# Patient Record
Sex: FEMALE | Race: WHITE | NOT HISPANIC OR LATINO | Employment: OTHER | URBAN - METROPOLITAN AREA
[De-identification: names, ages, dates, MRNs, and addresses within clinical notes are randomized per-mention and may not be internally consistent; named-entity substitution may affect disease eponyms.]

---

## 2017-02-23 ENCOUNTER — HISTORICAL (OUTPATIENT)
Dept: ADMINISTRATIVE | Facility: HOSPITAL | Age: 67
End: 2017-02-23

## 2017-02-23 LAB
ALBUMIN SERPL-MCNC: 4.2 G/DL (ref 3.1–4.7)
ALP SERPL-CCNC: 82 IU/L (ref 40–104)
ALT (SGPT): 15 IU/L (ref 3–33)
AST SERPL-CCNC: 21 IU/L (ref 10–40)
BILIRUB SERPL-MCNC: 0.5 MG/DL (ref 0.3–1)
BUN SERPL-MCNC: 12 MG/DL (ref 8–20)
CALCIUM SERPL-MCNC: 9.1 MG/DL (ref 7.7–10.4)
CHLORIDE: 99 MMOL/L (ref 98–110)
CO2 SERPL-SCNC: 26 MMOL/L (ref 22.8–31.6)
CREATININE RANDOM URINE: 56 MG/DL
CREATININE: 0.79 MG/DL (ref 0.6–1.4)
GLUCOSE: 100 MG/DL (ref 70–99)
MICROALBUM.,U,RANDOM: 3.8 MCG/ML (ref 0–19.9)
MICROALBUMIN/CREATININE RATIO: 7 (ref 0–30)
POTASSIUM SERPL-SCNC: 3.9 MMOL/L (ref 3.5–5)
PROT SERPL-MCNC: 7.8 G/DL (ref 6–8.2)
SODIUM: 134 MMOL/L (ref 134–144)

## 2017-05-23 RX ORDER — ALBUTEROL SULFATE 90 UG/1
1 AEROSOL, METERED RESPIRATORY (INHALATION) DAILY PRN
COMMUNITY
Start: 2017-02-24 | End: 2018-07-09 | Stop reason: SDUPTHER

## 2017-05-23 RX ORDER — SERTRALINE HYDROCHLORIDE 50 MG/1
1 TABLET, FILM COATED ORAL DAILY
COMMUNITY
Start: 2017-03-25 | End: 2018-04-04 | Stop reason: SDUPTHER

## 2017-05-23 RX ORDER — AMLODIPINE AND OLMESARTAN MEDOXOMIL 5; 20 MG/1; MG/1
1 TABLET ORAL DAILY
COMMUNITY
Start: 2017-02-24 | End: 2018-01-08 | Stop reason: SDUPTHER

## 2017-05-23 RX ORDER — ASPIRIN 81 MG/1
1 TABLET ORAL DAILY
COMMUNITY
End: 2019-10-01 | Stop reason: SDUPTHER

## 2017-05-23 RX ORDER — OXYBUTYNIN CHLORIDE 5 MG/1
1 TABLET ORAL DAILY
COMMUNITY
Start: 2017-02-24 | End: 2017-08-18 | Stop reason: SDUPTHER

## 2017-05-23 RX ORDER — ESTRADIOL 0.05 MG/D
1 PATCH TRANSDERMAL WEEKLY
COMMUNITY
Start: 2016-12-07 | End: 2018-01-08 | Stop reason: SDUPTHER

## 2017-07-28 ENCOUNTER — TELEPHONE (OUTPATIENT)
Dept: FAMILY MEDICINE | Facility: CLINIC | Age: 67
End: 2017-07-28

## 2017-07-28 NOTE — TELEPHONE ENCOUNTER
Patient is wanting to know if she could take Green tea fat burner along with her current medications?

## 2017-08-11 ENCOUNTER — OFFICE VISIT (OUTPATIENT)
Dept: FAMILY MEDICINE | Facility: CLINIC | Age: 67
End: 2017-08-11
Payer: MEDICARE

## 2017-08-11 VITALS
HEIGHT: 67 IN | BODY MASS INDEX: 26.06 KG/M2 | SYSTOLIC BLOOD PRESSURE: 122 MMHG | HEART RATE: 79 BPM | WEIGHT: 166 LBS | DIASTOLIC BLOOD PRESSURE: 62 MMHG

## 2017-08-11 DIAGNOSIS — Z71.85 IMMUNIZATION COUNSELING: ICD-10-CM

## 2017-08-11 DIAGNOSIS — B96.89 LOCALIZED BACTERIAL SKIN INFECTION: ICD-10-CM

## 2017-08-11 DIAGNOSIS — E78.00 HYPERCHOLESTEROLEMIA: ICD-10-CM

## 2017-08-11 DIAGNOSIS — I10 BENIGN ESSENTIAL HYPERTENSION: Primary | ICD-10-CM

## 2017-08-11 DIAGNOSIS — L08.9 LOCALIZED BACTERIAL SKIN INFECTION: ICD-10-CM

## 2017-08-11 PROBLEM — M85.80 OSTEOPENIA: Status: ACTIVE | Noted: 2017-08-11

## 2017-08-11 PROBLEM — Z78.0 MENOPAUSE PRESENT: Status: ACTIVE | Noted: 2017-08-11

## 2017-08-11 PROBLEM — J44.9 CHRONIC OBSTRUCTIVE PULMONARY DISEASE: Status: ACTIVE | Noted: 2017-08-11

## 2017-08-11 PROBLEM — F41.9 ANXIETY: Status: ACTIVE | Noted: 2017-08-11

## 2017-08-11 PROBLEM — F17.200 CURRENT SMOKER: Status: ACTIVE | Noted: 2017-08-11

## 2017-08-11 PROBLEM — J30.1 HAY FEVER: Status: ACTIVE | Noted: 2017-08-11

## 2017-08-11 PROBLEM — N39.44 NOCTURNAL ENURESIS: Status: ACTIVE | Noted: 2017-02-24

## 2017-08-11 PROCEDURE — G0009 ADMIN PNEUMOCOCCAL VACCINE: HCPCS | Mod: ,,, | Performed by: INTERNAL MEDICINE

## 2017-08-11 PROCEDURE — 3078F DIAST BP <80 MM HG: CPT | Mod: ,,, | Performed by: INTERNAL MEDICINE

## 2017-08-11 PROCEDURE — 99213 OFFICE O/P EST LOW 20 MIN: CPT | Mod: 25,,, | Performed by: INTERNAL MEDICINE

## 2017-08-11 PROCEDURE — 90732 PPSV23 VACC 2 YRS+ SUBQ/IM: CPT | Mod: ,,, | Performed by: INTERNAL MEDICINE

## 2017-08-11 PROCEDURE — 3074F SYST BP LT 130 MM HG: CPT | Mod: ,,, | Performed by: INTERNAL MEDICINE

## 2017-08-11 PROCEDURE — 1159F MED LIST DOCD IN RCRD: CPT | Mod: ,,, | Performed by: INTERNAL MEDICINE

## 2017-08-11 PROCEDURE — 1125F AMNT PAIN NOTED PAIN PRSNT: CPT | Mod: ,,, | Performed by: INTERNAL MEDICINE

## 2017-08-11 RX ORDER — CELECOXIB 200 MG/1
1 CAPSULE ORAL DAILY
Refills: 0 | COMMUNITY
Start: 2017-07-10 | End: 2021-05-07

## 2017-08-11 RX ORDER — ASCORBIC ACID 500 MG
TABLET ORAL
COMMUNITY
End: 2018-04-18

## 2017-08-11 RX ORDER — MUPIROCIN 20 MG/G
OINTMENT TOPICAL 2 TIMES DAILY
Qty: 22 G | Refills: 1 | Status: SHIPPED | OUTPATIENT
Start: 2017-08-11 | End: 2018-04-18

## 2017-08-11 NOTE — PATIENT INSTRUCTIONS
Taking Your Blood Pressure  Blood pressure is the force of blood against the artery wall as it moves from the heart through the blood vessels. You can take your own blood pressure reading using a digital monitor. Take readings as often as your healthcare provider instructs. Take each reading at the same time of day.  Step 1. Relax    · Take your blood pressure at the same time every day, such as in the morning or evening, or at the time your healthcare provider recommends.  · Wait at least a half-hour after smoking, eating, drinking caffeinated beverages, or exercising.  · Sit comfortably at a table with both feet on the floor. Do not cross your legs or feet. Place the monitor near you.  · Rest for a few minutes before you begin.  Step 2. Wrap the cuff    · Place your arm on the table, palm up. Your arm should be at the level of your heart. Wrap the cuff around your upper arm, just above your elbow. Its best done on bare skin, not over clothing. Most cuffs will indicate where the brachial artery (the blood vessel in the middle of the arm at the inner side of the elbow) should line up with the cuff. Look in your monitor's instruction booklet for an illustration. You can also bring your cuff to your healthcare provider and have them show you how to correctly place the cuff.  · Make sure your cuff fits. If it doesnt wrap around your upper arm, order a larger cuff.  Step 3. Inflate the cuff    · Pump the cuff until the scale reads 160. If you have a self-inflating cuff, push the button that starts the pump.  · The cuff will tighten, then loosen.  · The numbers will change. When they stop changing, your blood pressure reading will appear.  · Take 2 or 3 readings one minute apart.  Step 4. Write down the results of each reading    · Write down your blood pressure numbers for each reading. Note the date and time. Keep your results in one place, such as a notebook. Even if your monitor has a built-in memory, keep a hard  copy of the readings.  · Remove the cuff from your arm. Turn off the machine.  · Share your blood pressure records with your healthcare providers at each visit.  Date Last Reviewed: 4/27/2016  © 8899-9039 [employer]. [address] [place] [address], [place] [address]. All rights reserved. This information is not intended as a substitute for professional medical care. Always follow your healthcare professional's instructions.

## 2017-08-11 NOTE — PROGRESS NOTES
Subjective:       Patient ID: Jia Vega is a 67 y.o. female.    Chief Complaint: Hypertension and Hyperlipidemia    Ms. Jia Vega is a 67-year-old  female who comes for follow-up on hypertension and dyslipidemia. She also gets intermittent skin infections in her groin and she requests refill on Mupirocin.    She is also due for pneumococcal 23 vaccine.      Hypertension   This is a chronic problem. The current episode started more than 1 year ago. The problem is controlled. Pertinent negatives include no chest pain, headaches, neck pain, palpitations, peripheral edema or shortness of breath. There are no associated agents to hypertension. Past treatments include calcium channel blockers and angiotensin blockers (Amlodipine/olmesartan). The current treatment provides moderate improvement. Compliance problems include psychosocial issues.  There is no history of renovascular disease. There is no history of a hypertension causing med or pheochromocytoma.   Hyperlipidemia   This is a chronic problem. The current episode started more than 1 year ago. The problem is controlled. She has no history of diabetes, obesity or nephrotic syndrome. Pertinent negatives include no chest pain or shortness of breath. Current antihyperlipidemic treatment includes statins. Risk factors for coronary artery disease include hypertension.       Past Medical History:   Diagnosis Date    Arthritis     Asthma     Full dentures     GERD (gastroesophageal reflux disease)     Hypertension     Seasonal allergies     Wears glasses      Social History     Social History    Marital status:      Spouse name: N/A    Number of children: N/A    Years of education: N/A     Occupational History    Not on file.     Social History Main Topics    Smoking status: Current Every Day Smoker     Packs/day: 0.25     Years: 40.00     Types: Cigarettes    Smokeless tobacco: Never Used      Comment: patient states 3 cigs  "per day    Alcohol use No    Drug use: No    Sexual activity: No     Other Topics Concern    Not on file     Social History Narrative    No narrative on file     Past Surgical History:   Procedure Laterality Date    BACK SURGERY      cubital tunnel release  left    HAND SURGERY      right and left: ganglion cyst    HYSTERECTOMY  BSO    NECK SURGERY       Family History   Problem Relation Age of Onset    Family history unknown: Yes       Review of Systems   Constitutional: Negative for activity change, chills, fatigue, fever and unexpected weight change.   HENT: Negative for congestion, postnasal drip and sinus pressure.    Eyes: Negative for pain, discharge and visual disturbance.   Respiratory: Negative for cough, chest tightness and shortness of breath.    Cardiovascular: Negative for chest pain, palpitations and leg swelling.        Patient has hypertension   Gastrointestinal: Negative for abdominal distention, anal bleeding, constipation and diarrhea.   Endocrine:        Patient has dyslipidemia.   Genitourinary: Negative for difficulty urinating, dysuria, flank pain, frequency, menstrual problem, pelvic pain, vaginal bleeding and vaginal pain.   Musculoskeletal: Negative for arthralgias, joint swelling and neck pain.   Skin: Negative for color change, pallor and rash.        Patient gets intermittent skin infections and groin.   Allergic/Immunologic: Negative for environmental allergies, food allergies and immunocompromised state.   Neurological: Negative for dizziness, tremors, seizures, syncope, light-headedness and headaches.   Hematological: Negative for adenopathy. Does not bruise/bleed easily.   Psychiatric/Behavioral: Negative for agitation, confusion and dysphoric mood. The patient is not nervous/anxious.        Objective:       Vitals:    08/11/17 0804   BP: 122/62   Pulse: 79   Weight: 75.3 kg (166 lb)   Height: 5' 7" (1.702 m)     Physical Exam   Constitutional: She is oriented to person, " place, and time. She appears well-developed and well-nourished. She is cooperative. No distress.   HENT:   Head: Normocephalic and atraumatic.   Eyes: Conjunctivae, EOM and lids are normal. Pupils are equal, round, and reactive to light. Lids are everted and swept, no foreign bodies found. Right pupil is round and reactive. Left pupil is round and reactive.   Neck: Trachea normal and normal range of motion. Neck supple.   Cardiovascular: Normal rate, regular rhythm, S1 normal, S2 normal, normal heart sounds and intact distal pulses.    Pulmonary/Chest: Breath sounds normal.   Abdominal: Soft. Bowel sounds are normal. There is no rigidity and no guarding.   Musculoskeletal: Normal range of motion.   Lymphadenopathy:     She has no cervical adenopathy.   Neurological: She is alert and oriented to person, place, and time.   Skin: Skin is warm and dry. Capillary refill takes less than 2 seconds.   Psychiatric: Her behavior is normal. Judgment and thought content normal.   Nursing note and vitals reviewed.      Assessment:       1. Benign essential hypertension    2. Hypercholesterolemia    3. Localized bacterial skin infection    4. Immunization counseling         Plan:           Benign essential hypertension    Hypercholesterolemia    Localized bacterial skin infection  -     mupirocin (BACTROBAN) 2 % ointment; Apply topically 2 (two) times daily.  Dispense: 22 g; Refill: 1    Immunization counseling  -     (In Office Administered) Pneumococcal Polysaccharide Vaccine (23 Valent) (SQ/IM)    Patient has been advised to quit smoking. She'll be updated on pneumonia 23 vaccine. Subsequently will give order for shingles vaccine if she has not already had one. In another month the influenza season starts and she is welcome to get that vaccine from the office.    Current Outpatient Prescriptions on File Prior to Visit   Medication Sig Dispense Refill    albuterol (PROAIR HFA) 90 mcg/actuation inhaler Inhale 1 puff into the  lungs daily as needed.      amlodipine-olmesartan (ALICE) 5-20 mg per tablet Take 1 tablet by mouth Daily.      aspirin (ECOTRIN) 81 MG EC tablet Take 1 tablet by mouth Daily.      esomeprazole (NEXIUM) 40 MG capsule Take 40 mg by mouth before breakfast.       estradiol 0.05 mg/24 hr td ptwk (CLIMARA) 0.05 mg/24 hr PTWK Place 1 patch onto the skin once a week.      multivitamin capsule Take 1 capsule by mouth once daily.        pravastatin (PRAVACHOL) 80 MG tablet Take 80 mg by mouth once daily.       VITAMIN B COMPLEX (B COMPLEX 1 ORAL) Take 1 tablet by mouth Daily.      oxybutynin (DITROPAN) 5 MG Tab Take 1 tablet by mouth Daily.      sertraline (ZOLOFT) 50 MG tablet Take 1 tablet by mouth Daily.       No current facility-administered medications on file prior to visit.

## 2017-08-18 RX ORDER — OXYBUTYNIN CHLORIDE 5 MG/1
TABLET ORAL
Qty: 90 TABLET | Refills: 3 | Status: SHIPPED | OUTPATIENT
Start: 2017-08-18 | End: 2017-11-08 | Stop reason: SDUPTHER

## 2017-11-08 ENCOUNTER — OFFICE VISIT (OUTPATIENT)
Dept: FAMILY MEDICINE | Facility: CLINIC | Age: 67
End: 2017-11-08
Payer: MEDICARE

## 2017-11-08 VITALS
HEART RATE: 79 BPM | RESPIRATION RATE: 14 BRPM | BODY MASS INDEX: 26.68 KG/M2 | DIASTOLIC BLOOD PRESSURE: 62 MMHG | WEIGHT: 170 LBS | SYSTOLIC BLOOD PRESSURE: 113 MMHG | HEIGHT: 67 IN

## 2017-11-08 DIAGNOSIS — E78.00 HYPERCHOLESTEROLEMIA: ICD-10-CM

## 2017-11-08 DIAGNOSIS — N39.44 NOCTURNAL ENURESIS: ICD-10-CM

## 2017-11-08 DIAGNOSIS — Z01.818 PREOP EXAM FOR INTERNAL MEDICINE: Primary | ICD-10-CM

## 2017-11-08 DIAGNOSIS — I10 BENIGN ESSENTIAL HYPERTENSION: ICD-10-CM

## 2017-11-08 DIAGNOSIS — J43.1 PANLOBULAR EMPHYSEMA: ICD-10-CM

## 2017-11-08 PROCEDURE — 99213 OFFICE O/P EST LOW 20 MIN: CPT | Mod: ,,, | Performed by: INTERNAL MEDICINE

## 2017-11-08 RX ORDER — OXYBUTYNIN CHLORIDE 5 MG/1
5 TABLET ORAL NIGHTLY
Qty: 90 TABLET | Refills: 3 | Status: SHIPPED | OUTPATIENT
Start: 2017-11-08 | End: 2018-08-23

## 2017-11-08 NOTE — PROGRESS NOTES
Subjective:       Patient ID: Jia Vega is a 67 y.o. female.    Chief Complaint: Pre-op Exam (eye surg )    The procedure scheduled is a cataract surgery on November/December 2017. The surgeon for the procedure will be Dr. bree M.D. ophthalmology. The chief complaint is cataractsContinue to watch diet, exercise and weight management. Cut down on fats increase. More greens and vegetables.. Recent symptoms include cough and urinary frequency, while recent symptoms do not include fever, chills, fatigue, chest pain, dyspnea, dysuria, nausea, vomiting, diarrhea, abdominal pain, easy bruising or lower extremity swelling. The patient's last health maintenance visit was month(s) ago. Pertinent family history does not include anesthesia reaction, myocardial infarction, stroke, aneurysm, sudden death, clotting disorder or bleeding disorder. Pertinent social history includes tobacco use and wearing dentures or partial plates, while pertinent social history does not include illicit drug use, transfusion refusal or concerns regarding care after surgery. After surgery the patient plans to recover at home with family.    Patient has underlying hypertension, dyslipidemia, GERD, arthritis and mild intermittent exacerbation of COPD and bronchitis. She has chronic insomnia also.  Recently she has been complaining of bladder incontinence symptoms and nocturia. I've given her a prescription of Ditropan last time which apparently either did not reach the pharmacy or she lost the prescription.    She uses also estrogen patch for menopausal symptoms. She has been advised that tobacco use and estrogen patch do not go well together and may increase the risk of blood clots.      Hypertension   This is a chronic problem. The current episode started more than 1 year ago. The problem has been waxing and waning since onset. The problem is controlled. Associated symptoms include anxiety, malaise/fatigue and shortness of breath.  Pertinent negatives include no chest pain, headaches, neck pain, palpitations or peripheral edema. Risk factors for coronary artery disease include smoking/tobacco exposure and sedentary lifestyle. Past treatments include calcium channel blockers and angiotensin blockers. Compliance problems include psychosocial issues.  There is no history of a hypertension causing med.   Hyperlipidemia   This is a chronic problem. The current episode started more than 1 year ago. The problem is controlled. Associated symptoms include shortness of breath. Pertinent negatives include no chest pain. Current antihyperlipidemic treatment includes statins.   Shortness of Breath   This is a chronic problem. The current episode started more than 1 year ago. The problem occurs intermittently. Pertinent negatives include no chest pain, fever, headaches, leg swelling, neck pain or rash. She has tried beta agonist inhalers for the symptoms. The treatment provided moderate relief. Her past medical history is significant for COPD (smoker).       Past Medical History:   Diagnosis Date    Arthritis     Asthma     Full dentures     GERD (gastroesophageal reflux disease)     Hypertension     Seasonal allergies     Wears glasses      Social History     Social History    Marital status:      Spouse name: N/A    Number of children: N/A    Years of education: N/A     Occupational History    Not on file.     Social History Main Topics    Smoking status: Current Every Day Smoker     Packs/day: 0.25     Years: 40.00     Types: Cigarettes    Smokeless tobacco: Never Used      Comment: patient states 3 cigs per day    Alcohol use No    Drug use: No    Sexual activity: No     Other Topics Concern    Not on file     Social History Narrative    No narrative on file     Past Surgical History:   Procedure Laterality Date    BACK SURGERY      cubital tunnel release  left    HAND SURGERY      right and left: ganglion cyst     "HYSTERECTOMY  BSO    NECK SURGERY       Family History   Problem Relation Age of Onset    Family history unknown: Yes       Review of Systems   Constitutional: Positive for malaise/fatigue. Negative for activity change, chills, fatigue, fever and unexpected weight change.   HENT: Negative for congestion, postnasal drip and sinus pressure.    Eyes: Positive for visual disturbance. Negative for pain and discharge.        Patient has bilateral cataracts.   Respiratory: Positive for shortness of breath. Negative for cough and chest tightness.         Long-standing history of smoking.   Cardiovascular: Negative for chest pain, palpitations and leg swelling.        Patient has hypertension   Gastrointestinal: Negative for abdominal distention, anal bleeding, constipation and diarrhea.   Endocrine: Negative for heat intolerance and polydipsia.        Patient has dyslipidemia.   Genitourinary: Positive for enuresis and frequency. Negative for difficulty urinating, dysuria and flank pain.        Nocturia and enuresis.   Musculoskeletal: Positive for arthralgias. Negative for joint swelling and neck pain.   Skin: Negative for color change, pallor and rash.        Patient gets intermittent skin infections and groin.   Allergic/Immunologic: Negative for environmental allergies, food allergies and immunocompromised state.   Neurological: Negative for dizziness, tremors, seizures, syncope, light-headedness and headaches.   Hematological: Negative for adenopathy. Does not bruise/bleed easily.   Psychiatric/Behavioral: Negative for agitation, confusion and dysphoric mood. The patient is not nervous/anxious.        Objective:       Vitals:    11/08/17 1015   BP: 113/62   Pulse: 79   Resp: 14   Weight: 77.1 kg (170 lb)   Height: 5' 7" (1.702 m)     Physical Exam   Constitutional: She is oriented to person, place, and time. She appears well-developed and well-nourished. She is cooperative. No distress.   HENT:   Head: Normocephalic " and atraumatic.   Mouth/Throat: She has dentures (upper and lower).   Eyes: Conjunctivae, EOM and lids are normal. Pupils are equal, round, and reactive to light. Lids are everted and swept, no foreign bodies found. Right pupil is round and reactive. Left pupil is round and reactive.   Neck: Trachea normal and normal range of motion. Neck supple.   Cardiovascular: Normal rate, regular rhythm, S1 normal, S2 normal and normal heart sounds.    Pulmonary/Chest: No respiratory distress. She has decreased breath sounds in the right middle field and the left middle field.   Abdominal: Soft. Bowel sounds are normal. There is no rigidity and no guarding.   Musculoskeletal: Normal range of motion.   Lymphadenopathy:     She has no cervical adenopathy.   Neurological: She is alert and oriented to person, place, and time.   Skin: Skin is warm and dry. Capillary refill takes less than 2 seconds.   Psychiatric: Her behavior is normal. Judgment and thought content normal.   Nursing note and vitals reviewed.      Assessment:       1. Preop exam for internal medicine    2. Benign essential hypertension    3. Hypercholesterolemia    4. Panlobular emphysema    5. Nocturnal enuresis         Plan:           Preop exam for internal medicine    Benign essential hypertension  -     Basic metabolic panel; Future; Expected date: 11/08/2017    Hypercholesterolemia    Panlobular emphysema    Nocturnal enuresis  -     oxybutynin (DITROPAN) 5 MG Tab; Take 1 tablet (5 mg total) by mouth nightly.  Dispense: 90 tablet; Refill: 3    Check labs basic metabolic panel in view of blood pressures and ACE inhibitors.    She should take the blood pressure medication on the day of surgery. With a sip of water.    I've advised her to quit smoking for a long term basis.    At this point, I do not see any need for chest x-ray or electrocardiogram. She is fairly functional. She is medically clear subject to normal basic metabolic panel.    Follow-up in 3 months  or earlier as needed.    11/09/2017-basic metabolic panel has been reviewed and it is within normal range. Patient is medically clear for proposed cataract surgery.

## 2017-11-08 NOTE — PATIENT INSTRUCTIONS
Taking Your Blood Pressure  Blood pressure is the force of blood against the artery wall as it moves from the heart through the blood vessels. You can take your own blood pressure reading using a digital monitor. Take your readings the same each time, using the same arm. Take readings as often as your healthcare provider instructs.  About blood pressure monitors  Blood pressure monitors are designed for certain ages and cases. You can find monitors for older adults, for pregnant women, and for children. Make sure the one you choose is the right one for your age and situation.  The American [employer] Association recommends an automatic cuff monitor that fits on your upper arm (bicep). The cuff should fit your arm size. A cuff thats too large or too small will not give an accurate reading. Measure around your upper arm to find your size.  Monitors that attach to your finger or wrist are not as accurate as monitors for your upper arm.  Ask your healthcare provider for help in choosing a monitor. Bring your monitor to your next provider visit if you need help in using it the correct way.  The steps below are general instructions for using an automatic digital monitor.  Step 1. Relax    · Take your blood pressure at the same time every day, such as in the morning or evening, or at the time your healthcare provider recommends.  · Wait at least a half-hour after smoking, eating, or exercising. Don't drink coffee, tea, soda, or other caffeinated beverages before checking your blood pressure.  · Sit comfortably at a table with both feet on the floor. Do not cross your legs or feet. Place the monitor near you.  · Rest for a few minutes before you begin.  Step 2. Wrap the cuff    · Place your arm on the table, palm up. Your arm should be at the level of your heart. Wrap the cuff around your upper arm, just above your elbow. Its best done on bare skin, not over clothing. Most cuffs will indicate where the brachial artery (the  blood vessel in the middle of the arm at the inner side of the elbow) should line up with the cuff. Look in your monitor's instruction booklet for an illustration. You can also bring your cuff to your healthcare provider and have them show you how to correctly place the cuff.  Step 3. Inflate the cuff    · Push the button that starts the pump.  · The cuff will tighten, then loosen.  · The numbers will change. When they stop changing, your blood pressure reading will appear.  · Take 2 or 3 readings one minute apart.  Step 4. Write down the results of each reading    · Write down your blood pressure numbers for each reading. Note the date and time. Keep your results in one place, such as a notebook. Even if your monitor has a built-in memory, keep a hard copy of the readings.  · Remove the cuff from your arm. Turn off the machine.  · Bring your blood pressure records with your healthcare providers at each visit.  · If you start a new blood pressure medicine, note the day you started the new medicine. Also note the day if you change the dose of your medicine. This information goes on your blood pressure recording sheet. This will help your healthcare provider monitor how well the medicine changes are working.  · Ask your healthcare provider what numbers should prompt you to call him or her. Also ask what numbers should prompt you to get help right away.  Date Last Reviewed: 11/1/2016  © 1641-7209 [employer]. [address] [place] [address], [place] [address]. All rights reserved. This information is not intended as a substitute for professional medical care. Always follow your healthcare professional's instructions.

## 2017-11-09 LAB
BUN SERPL-MCNC: 12 MG/DL (ref 8–20)
CALCIUM SERPL-MCNC: 9 MG/DL (ref 7.7–10.4)
CHLORIDE: 101 MMOL/L (ref 98–110)
CO2 SERPL-SCNC: 28.4 MMOL/L (ref 22.8–31.6)
CREATININE: 0.76 MG/DL (ref 0.6–1.4)
GLUCOSE: 83 MG/DL (ref 70–99)
POTASSIUM SERPL-SCNC: 4.1 MMOL/L (ref 3.5–5)
SODIUM: 136 MMOL/L (ref 134–144)

## 2018-01-08 RX ORDER — ESTRADIOL 0.05 MG/D
1 PATCH TRANSDERMAL WEEKLY
Qty: 12 PATCH | Refills: 3 | Status: SHIPPED | OUTPATIENT
Start: 2018-01-08 | End: 2018-08-23 | Stop reason: DRUGHIGH

## 2018-01-08 RX ORDER — PRAVASTATIN SODIUM 80 MG/1
80 TABLET ORAL NIGHTLY
Qty: 90 TABLET | Refills: 3 | Status: SHIPPED | OUTPATIENT
Start: 2018-01-08 | End: 2018-12-27 | Stop reason: SDUPTHER

## 2018-01-08 RX ORDER — AMLODIPINE AND OLMESARTAN MEDOXOMIL 5; 20 MG/1; MG/1
1 TABLET ORAL DAILY
Qty: 90 TABLET | Refills: 3 | Status: SHIPPED | OUTPATIENT
Start: 2018-01-08 | End: 2019-01-02 | Stop reason: SDUPTHER

## 2018-01-15 ENCOUNTER — OFFICE VISIT (OUTPATIENT)
Dept: FAMILY MEDICINE | Facility: CLINIC | Age: 68
End: 2018-01-15
Payer: MEDICARE

## 2018-01-15 VITALS
BODY MASS INDEX: 27.62 KG/M2 | HEIGHT: 67 IN | HEART RATE: 97 BPM | TEMPERATURE: 99 F | SYSTOLIC BLOOD PRESSURE: 128 MMHG | WEIGHT: 176 LBS | DIASTOLIC BLOOD PRESSURE: 64 MMHG

## 2018-01-15 DIAGNOSIS — J00 ACUTE NASOPHARYNGITIS: Primary | ICD-10-CM

## 2018-01-15 PROCEDURE — 99212 OFFICE O/P EST SF 10 MIN: CPT | Mod: ,,, | Performed by: INTERNAL MEDICINE

## 2018-01-15 RX ORDER — PROMETHAZINE HYDROCHLORIDE AND DEXTROMETHORPHAN HYDROBROMIDE 6.25; 15 MG/5ML; MG/5ML
5 SYRUP ORAL NIGHTLY
Qty: 50 ML | Refills: 0 | Status: SHIPPED | OUTPATIENT
Start: 2018-01-15 | End: 2018-01-25

## 2018-01-15 RX ORDER — IBUPROFEN 600 MG/1
1 TABLET ORAL DAILY PRN
Refills: 1 | COMMUNITY
Start: 2018-01-09 | End: 2022-01-01

## 2018-01-15 RX ORDER — PREGABALIN 50 MG/1
1 CAPSULE ORAL DAILY PRN
Refills: 5 | COMMUNITY
Start: 2018-01-09 | End: 2018-04-18

## 2018-01-15 RX ORDER — BENZONATATE 200 MG/1
200 CAPSULE ORAL 3 TIMES DAILY PRN
Qty: 30 CAPSULE | Refills: 1 | Status: SHIPPED | OUTPATIENT
Start: 2018-01-15 | End: 2018-01-25

## 2018-01-15 NOTE — PATIENT INSTRUCTIONS

## 2018-01-15 NOTE — PROGRESS NOTES
Subjective:       Patient ID: Jia Vega is a 67 y.o. female.    Chief Complaint: Sinus Problem and URI    Sinus Problem   This is a new problem. The current episode started in the past 7 days. There has been no fever. Associated symptoms include congestion, coughing and sneezing. Pertinent negatives include no chills, shortness of breath or sinus pressure.   URI    This is a new problem. The current episode started in the past 7 days. There has been no fever. Associated symptoms include congestion, coughing and sneezing. Pertinent negatives include no chest pain or diarrhea. The treatment provided mild relief.       Past Medical History:   Diagnosis Date    Arthritis     Asthma     Full dentures     GERD (gastroesophageal reflux disease)     Hypertension     Seasonal allergies     Wears glasses      Social History     Social History    Marital status:      Spouse name: N/A    Number of children: N/A    Years of education: N/A     Occupational History    Not on file.     Social History Main Topics    Smoking status: Current Every Day Smoker     Packs/day: 0.25     Years: 40.00     Types: Cigarettes    Smokeless tobacco: Never Used      Comment: patient states 3 cigs per day    Alcohol use No    Drug use: No    Sexual activity: No     Other Topics Concern    Not on file     Social History Narrative    No narrative on file     Past Surgical History:   Procedure Laterality Date    BACK SURGERY      cubital tunnel release  left    HAND SURGERY      right and left: ganglion cyst    HYSTERECTOMY  BSO    NECK SURGERY       Family History   Problem Relation Age of Onset    Family history unknown: Yes       Review of Systems   Constitutional: Positive for activity change. Negative for chills, fatigue, fever and unexpected weight change.   HENT: Positive for congestion and sneezing. Negative for postnasal drip and sinus pressure.    Eyes: Negative for pain, discharge and visual  "disturbance.        Patient has bilateral cataracts.   Respiratory: Positive for cough. Negative for chest tightness and shortness of breath.         Long-standing history of smoking.   Cardiovascular: Negative for chest pain, palpitations and leg swelling.        Patient has hypertension   Gastrointestinal: Negative for abdominal distention, anal bleeding, constipation and diarrhea.   Endocrine:        Patient has dyslipidemia.   Genitourinary:        Nocturia and enuresis.   Musculoskeletal: Negative for arthralgias.   Skin:        Patient gets intermittent skin infections and groin.   Allergic/Immunologic: Positive for environmental allergies.       Objective:       Vitals:    01/15/18 1357   BP: 128/64   Pulse: 97   Temp: 98.8 °F (37.1 °C)   Weight: 79.8 kg (176 lb)   Height: 5' 7" (1.702 m)     Physical Exam   Constitutional: She appears well-developed and well-nourished. She is cooperative. No distress.   HENT:   Head: Normocephalic and atraumatic.   Mouth/Throat: She has dentures (upper and lower).   Eyes: Conjunctivae, EOM and lids are normal. Lids are everted and swept, no foreign bodies found. Right pupil is round and reactive. Left pupil is round and reactive.   Neck: Trachea normal. Neck supple.   Cardiovascular: Normal rate, regular rhythm, S1 normal, S2 normal and normal heart sounds.    Pulmonary/Chest: No respiratory distress. She has decreased breath sounds in the right middle field and the left middle field. She has no wheezes. She has no rales. She exhibits no tenderness.   Abdominal: Soft. There is no rigidity.   Lymphadenopathy:     She has no cervical adenopathy.   Neurological: She is alert.   Skin: Skin is warm and dry.   Psychiatric: Judgment and thought content normal.   Nursing note and vitals reviewed.      Assessment:       1. Acute nasopharyngitis         Plan:           Acute nasopharyngitis  -     benzonatate (TESSALON) 200 MG capsule; Take 1 capsule (200 mg total) by mouth 3 (three) " times daily as needed for Cough.  Dispense: 30 capsule; Refill: 1  -     promethazine-dextromethorphan (PROMETHAZINE-DM) 6.25-15 mg/5 mL Syrp; Take 5 mLs by mouth every evening. Do not drive on this med at night  Dispense: 50 mL; Refill: 0    Increase your water intake to 64-80 oz daily    Nasal Saline spray (Over the counter [place] spray or [place])  2 sprays each nostril 2-3 times a day for nasal congestion    Tylenol 500 mg 2 tablets or Ibuprofen 200 mg 2 tablets every 4-6 hours as needed for fever, headaches, sore throat, ear pain, bodyaches, and/or nasal/sinus inflammation    Warm salt water gargles with 1/2 cup water and 1 tablespoon salt every 4 hours    Warm tea with honey and lemon    Follow up with PCP in 1 week if symptoms do not resolve

## 2018-02-15 ENCOUNTER — OFFICE VISIT (OUTPATIENT)
Dept: FAMILY MEDICINE | Facility: CLINIC | Age: 68
End: 2018-02-15
Payer: MEDICARE

## 2018-02-15 VITALS
SYSTOLIC BLOOD PRESSURE: 131 MMHG | TEMPERATURE: 98 F | BODY MASS INDEX: 28.47 KG/M2 | WEIGHT: 181.38 LBS | HEIGHT: 67 IN | OXYGEN SATURATION: 97 % | RESPIRATION RATE: 16 BRPM | DIASTOLIC BLOOD PRESSURE: 77 MMHG | HEART RATE: 79 BPM

## 2018-02-15 DIAGNOSIS — E78.00 HYPERCHOLESTEROLEMIA: ICD-10-CM

## 2018-02-15 DIAGNOSIS — I10 BENIGN ESSENTIAL HYPERTENSION: Primary | ICD-10-CM

## 2018-02-15 DIAGNOSIS — Z11.59 NEED FOR HEPATITIS C SCREENING TEST: ICD-10-CM

## 2018-02-15 DIAGNOSIS — J40 BRONCHITIS: ICD-10-CM

## 2018-02-15 DIAGNOSIS — R10.9 ABDOMINAL CRAMPING: ICD-10-CM

## 2018-02-15 PROCEDURE — 1170F FXNL STATUS ASSESSED: CPT | Mod: ,,, | Performed by: INTERNAL MEDICINE

## 2018-02-15 PROCEDURE — 1159F MED LIST DOCD IN RCRD: CPT | Mod: ,,, | Performed by: INTERNAL MEDICINE

## 2018-02-15 PROCEDURE — 99214 OFFICE O/P EST MOD 30 MIN: CPT | Mod: ,,, | Performed by: INTERNAL MEDICINE

## 2018-02-15 PROCEDURE — 1125F AMNT PAIN NOTED PAIN PRSNT: CPT | Mod: ,,, | Performed by: INTERNAL MEDICINE

## 2018-02-15 NOTE — PATIENT INSTRUCTIONS
Abdominal Pain    Abdominal pain is pain in the stomach or belly area. Everyone has this pain from time to time. In many cases it goes away on its own. But abdominal pain can sometimes be due to a serious problem, such as appendicitis. So its important to know when to seek help.  Causes of abdominal pain  There are many possible causes of abdominal pain. Common causes in adults include:  · Constipation, diarrhea, or gas  · Stomach acid flowing back up into the esophagus (acid reflux or heartburn)  · Severe acid reflux, called GERD (gastroesophageal reflux disease)  · A sore in the lining of the stomach or small intestine (peptic ulcer)  · Inflammation of the gallbladder, liver, or pancreas  · Gallstones or kidney stones  · Appendicitis   · Intestinal blockage   · An internal organ pushing through a muscle or other tissue (hernia)  · Urinary tract infections  · In women, menstrual cramps, fibroids, or endometriosis  · Inflammation or infection of the intestines  Diagnosing the cause of abdominal pain  Your healthcare provider will do a physical exam help find the cause of your pain. If needed, tests will be ordered. Belly pain has many possible causes. So it can be hard to find the reason for your pain. Giving details about your pain can help. Tell your provider where and when you feel the pain, and what makes it better or worse. Also let your provider know if you have other symptoms such as:  · Fever  · Tiredness  · Upset stomach (nausea)  · Vomiting  · Changes in bathroom habits  Treating abdominal pain  Some causes of pain need emergency medical treatment right away. These include appendicitis or a bowel blockage. Other problems can be treated with rest, fluids, or medicines. Your healthcare provider can give you specific instructions for treatment or self-care based on what is causing your pain.  If you have vomiting or diarrhea, sip water or other clear fluids. When you are ready to eat solid foods again,  start with small amounts of easy-to-digest, low-fat foods. These include apple sauce, toast, or crackers.   When to seek medical care  Call 911 or go to the hospital right away if you:  · Cant pass stool and are vomiting  · Are vomiting blood or have bloody diarrhea or black, tarry diarrhea  · Have chest, neck, or shoulder pain  · Feel like you might pass out  · Have pain in your shoulder blades with nausea  · Have sudden, severe belly pain  · Have new, severe pain unlike any you have felt before  · Have a belly that is rigid, hard, and tender to touch  Call your healthcare provider if you have:  · Pain for more than 5 days  · Bloating for more than 2 days  · Diarrhea for more than 5 days  · A fever of 100.4°F (38.0°C) or higher, or as directed by your provider  · Pain that gets worse  · Weight loss for no reason  · Continued lack of appetite  · Blood in your stool  How to prevent abdominal pain  Here are some tips to help prevent abdominal pain:  · Eat smaller amounts of food at one time.  · Avoid greasy, fried, or other high-fat foods.  · Avoid foods that give you gas.  · Exercise regularly.  · Drink plenty of fluids.  To help prevent GERD symptoms:  · Quit smoking.  · Reduce alcohol and certain foods that increase stomach acid.  · Avoid aspirin and over-the-counter pain and fever medicines (NSAIDS or nonsteroidal anti-inflammatory drugs), if possible  · Lose extra weight.  · Finish eating at least 2 hours before you go to bed or lie down.  · Raise the head of your bed.  Date Last Reviewed: 7/1/2016  © 8379-7911 [employer]. [address] [place] [address], [place] [address]. All rights reserved. This information is not intended as a substitute for professional medical care. Always follow your healthcare professional's instructions.

## 2018-02-15 NOTE — PROGRESS NOTES
Subjective:       Patient ID: Jia Vega is a 67 y.o. female.    Chief Complaint: Hyperlipidemia; Hypertension; Abdominal Cramping; and Cough    Hyperlipidemia   This is a chronic problem. The current episode started more than 1 year ago. Current antihyperlipidemic treatment includes statins. Risk factors for coronary artery disease include a sedentary lifestyle, hypertension, post-menopausal and dyslipidemia.   Hypertension   This is a chronic problem. The current episode started more than 1 year ago. Pertinent negatives include no palpitations. Risk factors for coronary artery disease include sedentary lifestyle, dyslipidemia and post-menopausal state. Past treatments include calcium channel blockers.   Abdominal Cramping   This is a new problem. The current episode started in the past 7 days. Pertinent negatives include no arthralgias, constipation or fever. There is no history of pancreatitis, PUD or ulcerative colitis. Patient's medical history does not include kidney stones.   Cough   This is a recurrent problem. The current episode started more than 1 month ago. The problem has been waxing and waning. The cough is productive of purulent sputum. Pertinent negatives include no fever or postnasal drip. Her past medical history is significant for bronchitis and environmental allergies. There is no history of bronchiectasis or COPD.       Past Medical History:   Diagnosis Date    Arthritis     Asthma     Full dentures     GERD (gastroesophageal reflux disease)     Hypertension     Seasonal allergies     Wears glasses      Social History     Social History    Marital status:      Spouse name: N/A    Number of children: N/A    Years of education: N/A     Occupational History    Not on file.     Social History Main Topics    Smoking status: Current Every Day Smoker     Packs/day: 0.25     Years: 40.00     Types: Cigarettes    Smokeless tobacco: Never Used      Comment: patient states 3  "cigs per day    Alcohol use No    Drug use: No    Sexual activity: No     Other Topics Concern    Not on file     Social History Narrative    No narrative on file     Past Surgical History:   Procedure Laterality Date    BACK SURGERY      cubital tunnel release  left    HAND SURGERY      right and left: ganglion cyst    HYSTERECTOMY  BSO    NECK SURGERY       Family History   Problem Relation Age of Onset    Family history unknown: Yes       Review of Systems   Constitutional: Positive for activity change. Negative for fatigue, fever and unexpected weight change.   HENT: Negative for postnasal drip.    Eyes: Negative for pain, discharge and visual disturbance.        Patient has bilateral cataracts.   Respiratory: Negative for chest tightness.         Long-standing history of smoking.   Cardiovascular: Negative for palpitations and leg swelling.        Patient has hypertension   Gastrointestinal: Negative for abdominal distention, anal bleeding and constipation.   Endocrine:        Patient has dyslipidemia.   Genitourinary: Negative for difficulty urinating.        Nocturia and enuresis.   Musculoskeletal: Negative for arthralgias.   Skin:        Patient gets intermittent skin infections and groin.   Allergic/Immunologic: Positive for environmental allergies.   Neurological: Negative for dizziness.       Objective:       Vitals:    02/15/18 0757   BP: 131/77   Pulse: 79   Resp: 16   Temp: 98.1 °F (36.7 °C)   TempSrc: Oral   SpO2: 97%   Weight: 82.3 kg (181 lb 6.4 oz)   Height: 5' 7" (1.702 m)     Physical Exam   Constitutional: She appears well-developed and well-nourished. She is cooperative. No distress.   HENT:   Head: Normocephalic and atraumatic.   Mouth/Throat: She has dentures (upper and lower).   Eyes: Conjunctivae, EOM and lids are normal. Lids are everted and swept, no foreign bodies found. Right pupil is round and reactive. Left pupil is round and reactive.   Neck: Trachea normal. Neck supple. "   Cardiovascular: Normal rate, regular rhythm, S1 normal, S2 normal and normal heart sounds.    Pulmonary/Chest: No respiratory distress. She has decreased breath sounds in the right middle field and the left middle field. She has no wheezes. She has no rales. She exhibits no tenderness.   Abdominal: Soft. There is no hepatosplenomegaly. There is tenderness. There is no guarding, no tenderness at McBurney's point and negative Sal's sign. No hernia. Hernia confirmed negative in the ventral area.       Lymphadenopathy:     She has no cervical adenopathy.   Neurological: She is alert.   Skin: Skin is warm and dry.   Psychiatric: Judgment and thought content normal.   Nursing note and vitals reviewed.      Assessment:       1. Benign essential hypertension    2. Hypercholesterolemia    3. Abdominal cramping    4. Bronchitis    5. Need for hepatitis C screening test         Plan:           Benign essential hypertension    Hypercholesterolemia    Abdominal cramping    Bronchitis    Need for hepatitis C screening test  -     Hepatitis C antibody; Future; Expected date: 02/15/2018    Ms. Ro blood pressures are fairly stable. She is compliant to her blood pressure medications and cholesterol medication.    She has this cough with minimal production. No fevers. She has been a chronic smoker. I think this may be the beginning of chronic bronchitis or COPD. I've again urged her to quit smoking.    I will also advised her that combination of smoking and hormone pills is not good as it increases the blood clot and thrombogenic risk. In plain English it means that she is at risk for having blood clots to the brain,legs and in lungs. These can be lethal.    Patient is status post hysterectomy. The pain seems to be in the infraumbilical and suprapubic region. It is nonspecific. It is unlikely to be diverticulitis because of no change in bowel movements. I've advised patient to take some stool softeners and laxatives for a  couple of days.  She can stop taking Zantac and continue with esomeprazole. I advised her totake some stool softeners and laxatives for a couple of days till she has a complete bowel movement. And then we will see if we need further investigations for the lower abdominal pain. She does have an appointment for Dr. Leung perhaps next month and he may consider doing further investigations.    I'll check with her in a couple of days as to how she is doing. She should take some over-the-counter Mucinex to help with a mucus and phlegm. She also uses albuterol as needed.    I will see her back in a couple of months time to see how she is doing.    Patient seen with medical student Abdulaziz Whitman.

## 2018-03-03 LAB — HCV AB SERPL QL IA: <0.1 S/CO RATIO (ref 0–0.9)

## 2018-03-05 ENCOUNTER — TELEPHONE (OUTPATIENT)
Dept: FAMILY MEDICINE | Facility: CLINIC | Age: 68
End: 2018-03-05

## 2018-04-04 RX ORDER — SERTRALINE HYDROCHLORIDE 50 MG/1
TABLET, FILM COATED ORAL
Qty: 90 TABLET | Refills: 3 | Status: SHIPPED | OUTPATIENT
Start: 2018-04-04 | End: 2019-03-23 | Stop reason: SDUPTHER

## 2018-04-17 PROBLEM — J43.1 PANLOBULAR EMPHYSEMA: Status: ACTIVE | Noted: 2018-04-17

## 2018-04-17 PROBLEM — R10.9 ABDOMINAL CRAMPING: Status: ACTIVE | Noted: 2018-04-17

## 2018-04-17 NOTE — PROGRESS NOTES
Subjective:       Patient ID: Jia Vega is a 67 y.o. female.    Chief Complaint: Hypertension; Constipation; Hyperlipidemia; Abdominal Cramping; and Gastroesophageal Reflux    Ms. Jia Vega is a 67-year-old  female who comes for follow-up. Hard underlying medical issues include hypertension and hyperlipidemia, gastroesophageal reflux and chronic smoking with some degree of emphysema. She has cut down smoking to 3 cigarettes per day.    Recently she has gained some weight. She has on and off intermittent abdominal cramping without any significant diarrhea but some constipation. She has not made up her mind about colonoscopy as yet.    She is due for mammogram. Her blood pressures are usually under control.    No significant cough or expectoration.      Abdominal Cramping   This is a chronic problem. The current episode started more than 1 month ago. The onset quality is undetermined. Associated symptoms include constipation. Pertinent negatives include no arthralgias, diarrhea, dysuria, fever, headaches, nausea or vomiting. Her past medical history is significant for GERD.   Hypertension   This is a chronic problem. The current episode started more than 1 year ago. The problem has been gradually improving since onset. Pertinent negatives include no headaches, palpitations or shortness of breath. Past treatments include calcium channel blockers and angiotensin blockers. The current treatment provides moderate improvement. Compliance problems include psychosocial issues.    Hyperlipidemia   This is a chronic problem. The current episode started more than 1 year ago. She has no history of obesity. Pertinent negatives include no shortness of breath. Current antihyperlipidemic treatment includes statins.   Gastroesophageal Reflux   She complains of abdominal pain (cramp) and heartburn. She reports no coughing or no nausea. The heartburn does not wake her from sleep. Pertinent negatives include  no fatigue.       Past Medical History:   Diagnosis Date    Arthritis     Asthma     Full dentures     GERD (gastroesophageal reflux disease)     Hypertension     Seasonal allergies     Wears glasses      Social History     Social History    Marital status:      Spouse name: N/A    Number of children: N/A    Years of education: N/A     Occupational History    Not on file.     Social History Main Topics    Smoking status: Current Every Day Smoker     Packs/day: 0.25     Years: 40.00     Types: Cigarettes    Smokeless tobacco: Never Used      Comment: patient states 3 cigs per day    Alcohol use No    Drug use: No    Sexual activity: No     Other Topics Concern    Not on file     Social History Narrative    No narrative on file     Past Surgical History:   Procedure Laterality Date    BACK SURGERY      cubital tunnel release  left    HAND SURGERY      right and left: ganglion cyst    HYSTERECTOMY  BSO    NECK SURGERY       Family History   Problem Relation Age of Onset    Family history unknown: Yes       Review of Systems   Constitutional: Positive for activity change and unexpected weight change (wt gain). Negative for appetite change, fatigue and fever.   HENT: Negative for congestion, drooling and postnasal drip.    Eyes: Negative for pain, discharge and visual disturbance.        Patient has bilateral cataracts.   Respiratory: Negative for cough, chest tightness and shortness of breath.         Long-standing history of smoking.   Cardiovascular: Negative for palpitations and leg swelling.        Patient has hypertension   Gastrointestinal: Positive for abdominal pain (cramp), constipation and heartburn. Negative for abdominal distention, anal bleeding, diarrhea, nausea, rectal pain and vomiting.   Endocrine:        Patient has dyslipidemia.   Genitourinary: Negative for difficulty urinating, dysuria and pelvic pain.        Nocturia and enuresis.   Musculoskeletal: Negative for  "arthralgias.   Skin:        Patient gets intermittent skin infections and groin.   Allergic/Immunologic: Positive for environmental allergies.   Neurological: Negative for dizziness, seizures and headaches.   Psychiatric/Behavioral: Negative for agitation and hallucinations.       Objective:       Vitals:    04/18/18 0814   BP: 130/85   Pulse: 83   Resp: 16   SpO2: 96%   Weight: 82.6 kg (182 lb 3.2 oz)   Height: 5' 7" (1.702 m)     Physical Exam   Constitutional: She appears well-developed and well-nourished. She is cooperative. No distress.   HENT:   Head: Normocephalic and atraumatic.   Mouth/Throat: She has dentures (upper and lower).   Eyes: Conjunctivae, EOM and lids are normal. Lids are everted and swept, no foreign bodies found. Right pupil is round and reactive. Left pupil is round and reactive.   Neck: Trachea normal. Neck supple.   Cardiovascular: Normal rate, regular rhythm, S1 normal, S2 normal and normal heart sounds.    Pulmonary/Chest: No respiratory distress. She has decreased breath sounds in the right middle field and the left middle field. She has no wheezes. She has no rales. She exhibits no tenderness.   Abdominal: Soft. There is no hepatosplenomegaly. There is no tenderness. There is no guarding, no tenderness at McBurney's point and negative Sal's sign. No hernia. Hernia confirmed negative in the ventral area.       She has no tenderness on palpation. No tenderness on palpation.   Lymphadenopathy:     She has no cervical adenopathy.   Neurological: She is alert.   Skin: Skin is warm and dry.   Psychiatric: Judgment and thought content normal.   Nursing note and vitals reviewed.      Assessment:       1. Benign essential hypertension    2. Hypercholesterolemia    3. Panlobular emphysema    4. Abdominal cramping    5. Screening for breast cancer         Glucose 70 - 99 mg/dL 83    BUN, Bld 8 - 20 mg/dL 12    Creatinine 0.60 - 1.40 mg/dL 0.76    Calcium 7.7 - 10.4 mg/dL 9.0    Sodium 134 - 144 " mmol/L 136    Potassium 3.5 - 5.0 mmol/L 4.1    Chloride 98 - 110 mmol/L 101    CO2 22.8 - 31.6 mmol/L 28.       Plan:           Benign essential hypertension  -     Comprehensive metabolic panel; Future; Expected date: 04/18/2018  -     Microalbumin/creatinine urine ratio    Hypercholesterolemia  -     Lipid panel; Future; Expected date: 04/18/2018    Panlobular emphysema    Abdominal cramping  -     CBC auto differential; Future; Expected date: 04/18/2018    Screening for breast cancer  -     Mammo Digital Screening Bilat with CAD; Future; Expected date: 04/18/2018    Patient's blood pressures are doing good so far. Last time I checked her labs was in 2017 and it was okay. Next and when she comes back again I'll repeat the labs again.    She is due for mammogram now.    I've advised her that she should get her colon and stomach checked out and perhaps get her colonoscopy. She will think about it and get back to me. I will encourage her to do so, before we miss anything.    Patient has been advised to watch diet and exercise. Avoid fried and fatty food. Compliance to medications and follow up urged.      The patient is asked to make an attempt to improve diet and exercise patterns to aid in medical management of this problem.    Patient has cut down smoking to 3 cigarettes per day. I'm waiting for the day when she finally quit smoking. That day will be a celebration.    I willSee her in about 4 months to catch up on her and see how she is doing besides abdominal pain. Labs to be done before follow-up.

## 2018-04-18 ENCOUNTER — OFFICE VISIT (OUTPATIENT)
Dept: FAMILY MEDICINE | Facility: CLINIC | Age: 68
End: 2018-04-18
Payer: MEDICARE

## 2018-04-18 VITALS
HEART RATE: 83 BPM | BODY MASS INDEX: 28.6 KG/M2 | SYSTOLIC BLOOD PRESSURE: 130 MMHG | HEIGHT: 67 IN | OXYGEN SATURATION: 96 % | DIASTOLIC BLOOD PRESSURE: 85 MMHG | RESPIRATION RATE: 16 BRPM | WEIGHT: 182.19 LBS

## 2018-04-18 DIAGNOSIS — Z12.39 SCREENING FOR BREAST CANCER: ICD-10-CM

## 2018-04-18 DIAGNOSIS — J43.1 PANLOBULAR EMPHYSEMA: ICD-10-CM

## 2018-04-18 DIAGNOSIS — R10.9 ABDOMINAL CRAMPING: ICD-10-CM

## 2018-04-18 DIAGNOSIS — I10 BENIGN ESSENTIAL HYPERTENSION: Primary | ICD-10-CM

## 2018-04-18 DIAGNOSIS — E78.00 HYPERCHOLESTEROLEMIA: ICD-10-CM

## 2018-04-18 PROCEDURE — 99214 OFFICE O/P EST MOD 30 MIN: CPT | Mod: ,,, | Performed by: INTERNAL MEDICINE

## 2018-04-18 NOTE — PATIENT INSTRUCTIONS
Abdominal Pain    Abdominal pain is pain in the stomach or belly area. Everyone has this pain from time to time. In many cases it goes away on its own. But abdominal pain can sometimes be due to a serious problem, such as appendicitis. So its important to know when to seek help.  Causes of abdominal pain  There are many possible causes of abdominal pain. Common causes in adults include:  · Constipation, diarrhea, or gas  · Stomach acid flowing back up into the esophagus (acid reflux or heartburn)  · Severe acid reflux, called GERD (gastroesophageal reflux disease)  · A sore in the lining of the stomach or small intestine (peptic ulcer)  · Inflammation of the gallbladder, liver, or pancreas  · Gallstones or kidney stones  · Appendicitis   · Intestinal blockage   · An internal organ pushing through a muscle or other tissue (hernia)  · Urinary tract infections  · In women, menstrual cramps, fibroids, or endometriosis  · Inflammation or infection of the intestines  Diagnosing the cause of abdominal pain  Your healthcare provider will do a physical exam help find the cause of your pain. If needed, tests will be ordered. Belly pain has many possible causes. So it can be hard to find the reason for your pain. Giving details about your pain can help. Tell your provider where and when you feel the pain, and what makes it better or worse. Also let your provider know if you have other symptoms such as:  · Fever  · Tiredness  · Upset stomach (nausea)  · Vomiting  · Changes in bathroom habits  Treating abdominal pain  Some causes of pain need emergency medical treatment right away. These include appendicitis or a bowel blockage. Other problems can be treated with rest, fluids, or medicines. Your healthcare provider can give you specific instructions for treatment or self-care based on what is causing your pain.  If you have vomiting or diarrhea, sip water or other clear fluids. When you are ready to eat solid foods again,  start with small amounts of easy-to-digest, low-fat foods. These include apple sauce, toast, or crackers.   When to seek medical care  Call 911 or go to the hospital right away if you:  · Cant pass stool and are vomiting  · Are vomiting blood or have bloody diarrhea or black, tarry diarrhea  · Have chest, neck, or shoulder pain  · Feel like you might pass out  · Have pain in your shoulder blades with nausea  · Have sudden, severe belly pain  · Have new, severe pain unlike any you have felt before  · Have a belly that is rigid, hard, and tender to touch  Call your healthcare provider if you have:  · Pain for more than 5 days  · Bloating for more than 2 days  · Diarrhea for more than 5 days  · A fever of 100.4°F (38.0°C) or higher, or as directed by your provider  · Pain that gets worse  · Weight loss for no reason  · Continued lack of appetite  · Blood in your stool  How to prevent abdominal pain  Here are some tips to help prevent abdominal pain:  · Eat smaller amounts of food at one time.  · Avoid greasy, fried, or other high-fat foods.  · Avoid foods that give you gas.  · Exercise regularly.  · Drink plenty of fluids.  To help prevent GERD symptoms:  · Quit smoking.  · Reduce alcohol and certain foods that increase stomach acid.  · Avoid aspirin and over-the-counter pain and fever medicines (NSAIDS or nonsteroidal anti-inflammatory drugs), if possible  · Lose extra weight.  · Finish eating at least 2 hours before you go to bed or lie down.  · Raise the head of your bed.  Date Last Reviewed: 7/1/2016  © 0094-3117 [employer]. [address] [place] [address], [place] [address]. All rights reserved. This information is not intended as a substitute for professional medical care. Always follow your healthcare professional's instructions.

## 2018-04-19 LAB
ALBUMIN SERPL-MCNC: 3.8 G/DL (ref 3.1–4.7)
ALP SERPL-CCNC: 66 IU/L (ref 40–104)
ALT (SGPT): 16 IU/L (ref 3–33)
AST SERPL-CCNC: 25 IU/L (ref 10–40)
BASOPHILS NFR BLD: 0 K/UL (ref 0–0.2)
BASOPHILS NFR BLD: 0.3 %
BILIRUB SERPL-MCNC: 0.5 MG/DL (ref 0.3–1)
BUN SERPL-MCNC: 12 MG/DL (ref 8–20)
CALCIUM SERPL-MCNC: 9.2 MG/DL (ref 7.7–10.4)
CHLORIDE: 101 MMOL/L (ref 98–110)
CO2 SERPL-SCNC: 24.9 MMOL/L (ref 22.8–31.6)
CREATININE: 0.72 MG/DL (ref 0.6–1.4)
EOSINOPHIL NFR BLD: 0 K/UL (ref 0–0.7)
EOSINOPHIL NFR BLD: 0.2 %
ERYTHROCYTE [DISTWIDTH] IN BLOOD BY AUTOMATED COUNT: 14.1 % (ref 11.7–14.9)
GLUCOSE: 115 MG/DL (ref 70–99)
GRAN #: 11.9 K/UL (ref 1.4–6.5)
GRAN%: 78.3 %
HCT VFR BLD AUTO: 42.3 % (ref 36–48)
HGB BLD-MCNC: 13.6 G/DL (ref 12–15)
IMMATURE GRANS (ABS): 0.1 K/UL (ref 0–1)
IMMATURE GRANULOCYTES: 0.7 %
LYMPH #: 2.4 K/UL (ref 1.2–3.4)
LYMPH%: 15.6 %
MCH RBC QN AUTO: 28 PG (ref 25–35)
MCHC RBC AUTO-ENTMCNC: 32.2 G/DL (ref 31–36)
MCV RBC AUTO: 87 FL (ref 79–98)
MONO #: 0.7 K/UL (ref 0.1–0.6)
MONO%: 4.9 %
NUCLEATED RBCS: 0 %
PLATELET # BLD AUTO: 344 K/UL (ref 140–440)
PMV BLD AUTO: 9.3 FL (ref 8.8–12.7)
POTASSIUM SERPL-SCNC: 3.7 MMOL/L (ref 3.5–5)
PROT SERPL-MCNC: 8 G/DL (ref 6–8.2)
RBC # BLD AUTO: 4.86 M/UL (ref 3.5–5.5)
SODIUM: 137 MMOL/L (ref 134–144)
WBC # BLD AUTO: 15.2 K/UL (ref 5–10)

## 2018-04-24 ENCOUNTER — TELEPHONE (OUTPATIENT)
Dept: FAMILY MEDICINE | Facility: CLINIC | Age: 68
End: 2018-04-24

## 2018-04-24 DIAGNOSIS — D72.829 LEUKOCYTOSIS, UNSPECIFIED TYPE: Primary | ICD-10-CM

## 2018-04-24 DIAGNOSIS — R10.9 ABDOMINAL CRAMPING: ICD-10-CM

## 2018-04-24 NOTE — TELEPHONE ENCOUNTER
----- Message from Patrick Olson MD sent at 4/24/2018  1:51 PM CDT -----  I have left a message for patient. She needs to repeat CBC again. Her white cell counts were elevated.

## 2018-04-25 LAB
BASOPHILS NFR BLD: 0.1 K/UL (ref 0–0.2)
BASOPHILS NFR BLD: 0.5 %
EOSINOPHIL NFR BLD: 0.2 K/UL (ref 0–0.7)
EOSINOPHIL NFR BLD: 1.8 %
ERYTHROCYTE [DISTWIDTH] IN BLOOD BY AUTOMATED COUNT: 13.8 % (ref 11.7–14.9)
GRAN #: 6.5 K/UL (ref 1.4–6.5)
GRAN%: 65 %
HCT VFR BLD AUTO: 41.5 % (ref 36–48)
HGB BLD-MCNC: 13.9 G/DL (ref 12–15)
IMMATURE GRANS (ABS): 0.1 K/UL (ref 0–1)
IMMATURE GRANULOCYTES: 0.6 %
LYMPH #: 2.5 K/UL (ref 1.2–3.4)
LYMPH%: 24.7 %
MCH RBC QN AUTO: 28.2 PG (ref 25–35)
MCHC RBC AUTO-ENTMCNC: 33.5 G/DL (ref 31–36)
MCV RBC AUTO: 84.2 FL (ref 79–98)
MONO #: 0.7 K/UL (ref 0.1–0.6)
MONO%: 7.4 %
NUCLEATED RBCS: 0 %
PLATELET # BLD AUTO: 297 K/UL (ref 140–440)
PMV BLD AUTO: 8.5 FL (ref 8.8–12.7)
RBC # BLD AUTO: 4.93 M/UL (ref 3.5–5.5)
WBC # BLD AUTO: 10 K/UL (ref 5–10)

## 2018-07-09 RX ORDER — ALBUTEROL SULFATE 90 UG/1
AEROSOL, METERED RESPIRATORY (INHALATION)
Qty: 8.5 G | Refills: 2 | Status: SHIPPED | OUTPATIENT
Start: 2018-07-09 | End: 2018-09-30 | Stop reason: SDUPTHER

## 2018-08-23 ENCOUNTER — OFFICE VISIT (OUTPATIENT)
Dept: FAMILY MEDICINE | Facility: CLINIC | Age: 68
End: 2018-08-23
Payer: MEDICARE

## 2018-08-23 VITALS
HEART RATE: 94 BPM | DIASTOLIC BLOOD PRESSURE: 65 MMHG | BODY MASS INDEX: 29.98 KG/M2 | HEIGHT: 67 IN | SYSTOLIC BLOOD PRESSURE: 103 MMHG | WEIGHT: 191 LBS

## 2018-08-23 DIAGNOSIS — Z78.0 MENOPAUSE PRESENT: ICD-10-CM

## 2018-08-23 DIAGNOSIS — I10 BENIGN ESSENTIAL HYPERTENSION: Primary | ICD-10-CM

## 2018-08-23 DIAGNOSIS — J20.9 ACUTE BRONCHITIS, UNSPECIFIED ORGANISM: ICD-10-CM

## 2018-08-23 DIAGNOSIS — E78.00 HYPERCHOLESTEROLEMIA: ICD-10-CM

## 2018-08-23 DIAGNOSIS — J43.1 PANLOBULAR EMPHYSEMA: ICD-10-CM

## 2018-08-23 PROCEDURE — 99214 OFFICE O/P EST MOD 30 MIN: CPT | Mod: ,,, | Performed by: INTERNAL MEDICINE

## 2018-08-23 RX ORDER — ESTRADIOL 0.1 MG/D
1 PATCH TRANSDERMAL
Qty: 12 PATCH | Refills: 3 | Status: SHIPPED | OUTPATIENT
Start: 2018-08-23 | End: 2019-08-26 | Stop reason: SDUPTHER

## 2018-08-23 RX ORDER — DOXYCYCLINE 100 MG/1
100 CAPSULE ORAL 2 TIMES DAILY
Qty: 14 CAPSULE | Refills: 0 | Status: SHIPPED | OUTPATIENT
Start: 2018-08-23 | End: 2019-10-01

## 2018-08-23 RX ORDER — ZOSTER VACCINE RECOMBINANT, ADJUVANTED 50 MCG/0.5
KIT INTRAMUSCULAR
Refills: 0 | COMMUNITY
Start: 2018-06-12 | End: 2019-10-01

## 2018-08-23 NOTE — PROGRESS NOTES
Subjective:       Patient ID: Jia Vega is a 68 y.o. female.    Chief Complaint: Hypertension    Ms. Jia Vega is a 68-year-old  female who comes for follow-up. Patient's underlying medical issues of hypertension, dyslipidemia and long-standing history of smoking have been noted. Though she does not have a formal diagnosis of COPD, hard complains of intermittent episodes of cough with expectoration and exertional fatigue and shortness of breath doing the care that she may have some degree of emphysema.    She does feel some cough with a yellowish mucopurulent sputum. This has been going on for about more than 7 days. She does feel somewhat warm and hot flashes which she attributes to menopausal symptoms and ineffectiveness of her history all patch.    She has no immediate plans to quit smoking stating that she smokes only 3 cigarettes per day, to calm her nerves.    She states that her blood pressures are generally under control and she is compliant with medications.    She does take Nexium intermittently for reflux-like symptoms. She continues on sertraline for underlying anxiety. She did have a family reunion recently with all her grandchildren and great-grandchildren. Her  is apparently going to have some orthopedic intervention in near future which makes it is an anxious.    I did have a moderate good discussion about having hormones and smoking which increase the risk of thrombogenic processes and blood clots. She verbalizes understanding.          Hypertension   This is a chronic problem. The current episode started more than 1 month ago. The problem is controlled. Associated symptoms include sweats. Pertinent negatives include no headaches, palpitations or shortness of breath. Past treatments include calcium channel blockers and angiotensin blockers. The current treatment provides moderate improvement. Compliance problems include psychosocial issues.  There is no history of  pheochromocytoma, renovascular disease or a thyroid problem.   Hyperlipidemia   This is a chronic problem. The current episode started more than 1 year ago. The problem is controlled. She has no history of obesity. Pertinent negatives include no shortness of breath. Current antihyperlipidemic treatment includes statins. The current treatment provides moderate improvement of lipids. Compliance problems include psychosocial issues.  Risk factors for coronary artery disease include a sedentary lifestyle and hypertension.   Cough   This is a recurrent problem. The current episode started in the past 7 days. The problem has been waxing and waning. The cough is productive of purulent sputum. Associated symptoms include heartburn and sweats. Pertinent negatives include no fever, headaches, postnasal drip, shortness of breath or weight loss. She has tried a beta-agonist inhaler for the symptoms. The treatment provided mild relief. Her past medical history is significant for COPD and environmental allergies. Thus far patient has not been diagnosed formally.       Past Medical History:   Diagnosis Date    Arthritis     Asthma     Full dentures     GERD (gastroesophageal reflux disease)     Hypertension     Seasonal allergies     Wears glasses      Social History     Socioeconomic History    Marital status:      Spouse name: Not on file    Number of children: Not on file    Years of education: Not on file    Highest education level: Not on file   Social Needs    Financial resource strain: Not on file    Food insecurity - worry: Not on file    Food insecurity - inability: Not on file    Transportation needs - medical: Not on file    Transportation needs - non-medical: Not on file   Occupational History    Not on file   Tobacco Use    Smoking status: Current Every Day Smoker     Packs/day: 0.25     Years: 40.00     Pack years: 10.00     Types: Cigarettes    Smokeless tobacco: Never Used    Tobacco  "comment: patient states 3 cigs per day   Substance and Sexual Activity    Alcohol use: No     Alcohol/week: 1.0 oz     Types: 2 Standard drinks or equivalent per week    Drug use: No    Sexual activity: No   Other Topics Concern    Not on file   Social History Narrative    Not on file     Past Surgical History:   Procedure Laterality Date    BACK SURGERY      cubital tunnel release  left    HAND SURGERY      right and left: ganglion cyst    HYSTERECTOMY  BSO    NECK SURGERY       Family History   Family history unknown: Yes       Review of Systems   Constitutional: Positive for unexpected weight change (wt gain). Negative for activity change, appetite change, fatigue, fever and weight loss.   HENT: Negative for congestion, drooling and postnasal drip.    Eyes: Negative for pain, discharge and visual disturbance.        Patient has bilateral cataracts.   Respiratory: Positive for cough. Negative for chest tightness and shortness of breath.         Long-standing history of smoking.   Cardiovascular: Negative for palpitations and leg swelling.        Patient has hypertension   Gastrointestinal: Positive for constipation and heartburn. Negative for abdominal distention, abdominal pain, anal bleeding, diarrhea, nausea, rectal pain and vomiting.   Endocrine: Positive for heat intolerance.        Patient has dyslipidemia.Hot flashes   Genitourinary: Negative for difficulty urinating, dysuria and pelvic pain.        Nocturia and enuresis.   Musculoskeletal: Negative for arthralgias.   Skin:        Patient gets intermittent skin infections and groin.   Allergic/Immunologic: Positive for environmental allergies.   Neurological: Negative for dizziness, seizures and headaches.   Psychiatric/Behavioral: Negative for agitation and hallucinations. The patient is hyperactive.          Objective:      Blood pressure 103/65, pulse 94, height 5' 7" (1.702 m), weight 86.6 kg (191 lb). Body mass index is 29.91 kg/m².  Physical " Exam   Constitutional: She appears well-developed and well-nourished. She is cooperative. No distress.   HENT:   Head: Normocephalic and atraumatic.   Nose: Mucosal edema and rhinorrhea present.   Mouth/Throat: She has dentures (upper and lower).   Eyes: Conjunctivae, EOM and lids are normal. Lids are everted and swept, no foreign bodies found. Right pupil is round and reactive. Left pupil is round and reactive.   Neck: Trachea normal. Neck supple.   Cardiovascular: Normal rate, regular rhythm, S1 normal, S2 normal and normal heart sounds.   Pulmonary/Chest: No respiratory distress. She has decreased breath sounds in the right middle field and the left middle field. She has no wheezes. She has no rales. She exhibits no tenderness.   Abdominal: Soft. There is no hepatosplenomegaly. There is no tenderness. There is no guarding, no tenderness at McBurney's point and negative Sal's sign. No hernia. Hernia confirmed negative in the ventral area.   She has no tenderness on palpation. No tenderness on palpation.   Lymphadenopathy:     She has no cervical adenopathy.   Neurological: She is alert.   Skin: Skin is warm and dry.   Psychiatric: Judgment and thought content normal.   Nursing note and vitals reviewed.        Assessment:       1. Benign essential hypertension    2. Hypercholesterolemia    3. Panlobular emphysema    4. Menopause present             Continue walking and exercise.  No visits with results within 3 Month(s) from this visit.   Latest known visit with results is:   Orders Only on 04/25/2018   Component Date Value Ref Range Status    WBC 04/25/2018 10.0  5.0 - 10.0 K/uL Final    RBC 04/25/2018 4.93  3.50 - 5.50 M/uL Final    Hemoglobin 04/25/2018 13.9  12.0 - 15.0 g/dL Final    Hematocrit 04/25/2018 41.5  36.0 - 48.0 % Final    MCV 04/25/2018 84.2  79.0 - 98.0 fL Final    MCH 04/25/2018 28.2  25.0 - 35.0 pg Final    MCHC 04/25/2018 33.5  31.0 - 36.0 g/dL Final    RDW 04/25/2018 13.8  11.7 -  14.9 % Final    Platelets 04/25/2018 297  140 - 440 K/uL Final    MPV 04/25/2018 8.5* 8.8 - 12.7 fL Final    Gran% 04/25/2018 65.0  % Final    Lymph% 04/25/2018 24.7  % Final    Mono% 04/25/2018 7.4  % Final    Eosinophil% 04/25/2018 1.8  % Final    Basophil% 04/25/2018 0.5  % Final    Gran # (ANC) 04/25/2018 6.5  1.4 - 6.5 K/uL Final    Lymph # 04/25/2018 2.5  1.2 - 3.4 K/uL Final    Mono # 04/25/2018 0.7* 0.1 - 0.6 K/uL Final    Eos # 04/25/2018 0.2  0.0 - 0.7 K/uL Final    Baso # 04/25/2018 0.1  0.0 - 0.2 K/uL Final    Immature Grans (Abs) 04/25/2018 0.1  0.0 - 1.0 K/uL Final    Immature Granulocytes 04/25/2018 0.6  % Final    nRBC% 04/25/2018 0  % Final         Plan:           Benign essential hypertension    Hypercholesterolemia    Panlobular emphysema    Menopause present      Patient has been advised to watch diet and exercise. Avoid fried and fatty food. Compliance to medications and follow up urged.    The patient is asked to make an attempt to improve diet and exercise patterns to aid in medical management of this problem.    Discussed about menopausal symptoms and will give trial of increase of Climara patch 0.1 mg patch every weekly. Increased risk of thrombolytic events including blood clots and coronary events have been discussed with the patient's. She needs to continue her efforts to quit smoking.    She does have a yellow mucus and cough and will give trial of doxycycline. Avoidance of excessive sun exposure has been recommended.    Labs have been recommended for next visit.      Current Outpatient Medications:     amlodipine-olmesartan (ALICE) 5-20 mg per tablet, Take 1 tablet by mouth Daily., Disp: 90 tablet, Rfl: 3    aspirin (ECOTRIN) 81 MG EC tablet, Take 1 tablet by mouth Daily., Disp: , Rfl:     celecoxib (CELEBREX) 200 MG capsule, 1 capsule once daily., Disp: , Rfl: 0    esomeprazole (NEXIUM) 40 MG capsule, Take 40 mg by mouth before breakfast. , Disp: , Rfl:     estradiol  0.05 mg/24 hr td ptwk (CLIMARA) 0.05 mg/24 hr PTWK, Place 1 patch onto the skin once a week., Disp: 12 patch, Rfl: 3    ibuprofen (ADVIL,MOTRIN) 600 MG tablet, 1 tablet daily as needed., Disp: , Rfl: 1    multivitamin capsule, Take 1 capsule by mouth once daily.  , Disp: , Rfl:     pravastatin (PRAVACHOL) 80 MG tablet, Take 1 tablet (80 mg total) by mouth every evening., Disp: 90 tablet, Rfl: 3    PROAIR HFA 90 mcg/actuation inhaler, INHALE 1 PUFF BY MOUTH EVERY 4 TO 6 HOURS AS NEEDED FOR WHEEZING AND SHORTNESS OF BREATH, Disp: 8.5 g, Rfl: 2    sertraline (ZOLOFT) 50 MG tablet, TAKE 1 TABLET BY MOUTH EVERY NIGHT AT BEDTIME, Disp: 90 tablet, Rfl: 3    VITAMIN B COMPLEX (B COMPLEX 1 ORAL), Take 1 tablet by mouth Daily., Disp: , Rfl:     SHINGRIX, PF, 50 mcg/0.5 mL injection, ADM 0.5ML IM UTD, Disp: , Rfl: 0

## 2018-08-23 NOTE — PATIENT INSTRUCTIONS
What Is Acute Bronchitis?  Acute bronchitis is when the airways in your lungs (bronchial tubes) become red and swollen (inflamed). It is usually caused by a viral infection. But it can also occur because of a bacteria or allergen. Symptoms include a cough that produces yellow or greenish mucus and can last for days or sometimes weeks.  Inside healthy lungs    Air travels in and out of the lungs through the airways. The linings of these airways produce sticky mucus. This mucus traps particles that enter the lungs. Tiny structures called cilia then sweep the particles out of the airways.     Healthy airway: Airways are normally open. Air moves in and out easily.      Healthy cilia: Tiny, hairlike cilia sweep mucus and particles up and out of the airways.   Lungs with bronchitis  Bronchitis often occurs with a cold or the flu virus. The airways become inflamed (red and swollen). There is a deep hacking cough from the extra mucus. Other symptoms may include:  · Wheezing  · Chest discomfort  · Shortness of breath  · Mild fever  A second infection, this time due to bacteria, may then occur. And airways irritated by allergens or smoke are more likely to get infected.        Inflamed airway: Inflammation and extra mucus narrow the airway, causing shortness of breath.      Impaired cilia: Extra mucus impairs cilia, causing congestion and wheezing. Smoking makes the problem worse.   Making a diagnosis  A physical exam, health history, and certain tests help your healthcare provider make the diagnosis.  Health history  Your healthcare provider will ask you about your symptoms.  The exam  Your provider listens to your chest for signs of congestion. He or she may also check your ears, nose, and throat.  Possible tests  · A sputum test for bacteria. This requires a sample of mucus from your lungs.  · A nasal or throat swab. This tests to see if you have a bacterial infection.  · A chest X-ray. This is done if your healthcare  provider thinks you have pneumonia.  · Tests to check for an underlying condition. Other tests may be done to check for things such as allergies, asthma, or COPD (chronic obstructive pulmonary disease). You may need to see a specialist for more lung function testing.  Treating a cough  The main treatment for bronchitis is easing symptoms. Avoiding smoke, allergens, and other things that trigger coughing can often help. If the infection is bacterial, you may be given antibiotics. During the illness, it's important to get plenty of sleep. To ease symptoms:  · Dont smoke. Also avoid secondhand smoke.  · Use a humidifier. Or try breathing in steam from a hot shower. This may help loosen mucus.  · Drink a lot of water and juice. They can soothe the throat and may help thin mucus.  · Sit up or use extra pillows when in bed. This helps to lessen coughing and congestion.  · Ask your provider about using medicine. Ask about using cough medicine, pain and fever medicine, or a decongestant.  Antibiotics  Most cases of bronchitis are caused by cold or flu viruses. They dont need antibiotics to treat them, even if your mucus is thick and green or yellow. Antibiotics dont treat viral illness and antibiotics have not been shown to have any benefit in cases of acute bronchitis. Taking antibiotics when they are not needed increases your risk of getting an infection later that is antibiotic-resistant. Antibiotics can also cause severe cases of diarrhea that require other antibiotics to treat.  It is important that you accept your healthcare provider's opinion to not use antibiotics. Your provider will prescribe antibiotics if the infection is caused by bacteria. If they are prescribed:  · Take all of the medicine. Take the medicine until it is used up, even if symptoms have improved. If you dont, the bronchitis may come back.  · Take the medicines as directed. For instance, some medicines should be taken with food.  · Ask about  side effects. Ask your provider or pharmacist what side effects are common, and what to do about them.  Follow-up care  You should see your provider again in 2 to 3 weeks. By this time, symptoms should have improved. An infection that lasts longer may mean you have a more serious problem.  Prevention  · Avoid tobacco smoke. If you smoke, quit. Stay away from smoky places. Ask friends and family not to smoke around you, or in your home or car.  · Get checked for allergies.  · Ask your provider about getting a yearly flu shot. Also ask about pneumococcal or pneumonia shots.  · Wash your hands often. This helps reduce the chance of picking up viruses that cause colds and flu.  Call your healthcare provider if:  · Symptoms worsen, or you have new symptoms  · Breathing problems worsen or  become severe  · Symptoms dont get better within a week, or within 3 days of taking antibiotics   Date Last Reviewed: 2/1/2017  © 8710-4411 [employer]. [address] [place] [address], [place] [address]. All rights reserved. This information is not intended as a substitute for professional medical care. Always follow your healthcare professional's instructions.

## 2018-09-30 RX ORDER — ALBUTEROL SULFATE 90 UG/1
AEROSOL, METERED RESPIRATORY (INHALATION)
Qty: 8.5 G | Refills: 2 | Status: SHIPPED | OUTPATIENT
Start: 2018-09-30 | End: 2019-10-01 | Stop reason: SDUPTHER

## 2018-12-27 RX ORDER — PRAVASTATIN SODIUM 80 MG/1
80 TABLET ORAL NIGHTLY
Qty: 90 TABLET | Refills: 3 | Status: SHIPPED | OUTPATIENT
Start: 2018-12-27 | End: 2019-10-01 | Stop reason: SDUPTHER

## 2019-01-02 RX ORDER — AMLODIPINE AND OLMESARTAN MEDOXOMIL 5; 20 MG/1; MG/1
1 TABLET ORAL DAILY
Qty: 90 TABLET | Refills: 0 | Status: SHIPPED | OUTPATIENT
Start: 2019-01-02 | End: 2019-04-17 | Stop reason: SDUPTHER

## 2019-03-22 ENCOUNTER — OFFICE VISIT (OUTPATIENT)
Dept: PODIATRY | Facility: CLINIC | Age: 69
End: 2019-03-22
Payer: MEDICARE

## 2019-03-22 VITALS
SYSTOLIC BLOOD PRESSURE: 134 MMHG | BODY MASS INDEX: 28.25 KG/M2 | WEIGHT: 180 LBS | DIASTOLIC BLOOD PRESSURE: 84 MMHG | HEART RATE: 87 BPM | TEMPERATURE: 99 F | HEIGHT: 67 IN

## 2019-03-22 DIAGNOSIS — T14.8XXA CONTUSION OF BONE: ICD-10-CM

## 2019-03-22 DIAGNOSIS — M25.571 RIGHT ANKLE PAIN, UNSPECIFIED CHRONICITY: Primary | ICD-10-CM

## 2019-03-22 PROCEDURE — 73630 PR  X-RAY FOOT 3+ VW: ICD-10-PCS | Mod: RT,,, | Performed by: PODIATRIST

## 2019-03-22 PROCEDURE — 73630 X-RAY EXAM OF FOOT: CPT | Mod: RT,,, | Performed by: PODIATRIST

## 2019-03-22 PROCEDURE — 73610 X-RAY EXAM OF ANKLE: CPT | Mod: RT,,, | Performed by: PODIATRIST

## 2019-03-22 PROCEDURE — 99213 OFFICE O/P EST LOW 20 MIN: CPT | Mod: 25,,, | Performed by: PODIATRIST

## 2019-03-22 PROCEDURE — 99213 PR OFFICE/OUTPT VISIT, EST, LEVL III, 20-29 MIN: ICD-10-PCS | Mod: 25,,, | Performed by: PODIATRIST

## 2019-03-22 PROCEDURE — 73610 PR  X-RAY ANKLE 3+ VW: ICD-10-PCS | Mod: RT,,, | Performed by: PODIATRIST

## 2019-03-22 NOTE — PROGRESS NOTES
[address] [facility] [address]  [place] [address]  Phone: [phone]   [phone]    Patient's PCP:Patrick Olson MD  Referring Provider: No ref. provider found    Subjective:      Chief Complaint:: Ankle Pain (right)    DELL Vega is a 68 y.o. female who presents with a complaint of Right ankle pain that radiates from knee to ankle also on ball of foot under 2nd toe lasting for couple of weeks.Onset of the symptoms was hitting ankle on rocking chair. Current symptoms include pain,swells a little sometimes.  Aggravating factors are sometimes just sitting,in the bed,walking too long. Symptoms have stayed the same.Treatment to date have included Ice,Ace wrap. Patients rates pain 4/10 on pain scale.      Vitals:    03/22/19 0846   BP: 134/84   Pulse: 87   Temp: 98.6 °F (37 °C)     Shoe Size:  8    Past Surgical History:   Procedure Laterality Date    BACK SURGERY      cubital tunnel release  left    HAND SURGERY      right and left: ganglion cyst    HYSTERECTOMY  BSO    NECK SURGERY      RELEASE, ULNAR TUNNEL Left 11/12/2012    Performed by Marshall Mckin[facility] at [facility]    SINUS SURGERY FUNCTIONAL ENDOSCOPIC WITH NAVIGATION Right 7/19/2016    Performed by Carrington Remy MD at [facility] OR     Past Medical History:   Diagnosis Date    Arthritis     Asthma     Full dentures     GERD (gastroesophageal reflux disease)     Hypertension     Seasonal allergies     Wears glasses      Family History   Family history unknown: Yes        Social History:   Marital Status:   Alcohol History:  reports that she does not drink alcohol.  Tobacco History:  reports that she has been smoking cigarettes.  She has a 10.00 pack-year smoking history. she has never used smokeless tobacco.  Drug History:  reports that she does not use drugs.    Review of patient's allergies indicates:  No Known Allergies    Current Outpatient Medications   Medication Sig Dispense Refill    amlodipine-olmesartan (ALICE)  5-20 mg per tablet Take 1 tablet by mouth Daily. 90 tablet 0    aspirin (ECOTRIN) 81 MG EC tablet Take 1 tablet by mouth Daily.      celecoxib (CELEBREX) 200 MG capsule 1 capsule once daily.  0    esomeprazole (NEXIUM) 40 MG capsule Take 40 mg by mouth before breakfast.       estradiol 0.1 mg/24 hr td ptwk (CLIMARA) 0.1 mg/24 hr PTWK Place 1 patch onto the skin every 7 days. Please quit smoking 12 patch 3    ibuprofen (ADVIL,MOTRIN) 600 MG tablet 1 tablet daily as needed.  1    multivitamin capsule Take 1 capsule by mouth once daily.        mupirocin calcium 2% nasl oint (BACTROBAN) 2 % Oint by Nasal route 2 (two) times daily. 22 g 0    pravastatin (PRAVACHOL) 80 MG tablet Take 1 tablet (80 mg total) by mouth every evening. 90 tablet 3    PROAIR HFA 90 mcg/actuation inhaler INHALE 1 PUFF BY MOUTH EVERY 4 TO 6 HOURS AS NEEDED FOR WHEEZING AND SHORTNESS OF BREATH 8.5 g 2    sertraline (ZOLOFT) 50 MG tablet TAKE 1 TABLET BY MOUTH EVERY NIGHT AT BEDTIME 90 tablet 3    SHINGRIX, PF, 50 mcg/0.5 mL injection ADM 0.5ML IM UTD  0    VITAMIN B COMPLEX (B COMPLEX 1 ORAL) Take 1 tablet by mouth Daily.      doxycycline (MONODOX) 100 MG capsule Take 1 capsule (100 mg total) by mouth 2 (two) times daily. Avoid prolonged sun exposure 14 capsule 0     No current facility-administered medications for this visit.        Review of Systems      Objective:        Physical Exam:   Foot Exam    General  General Appearance: appears stated age and healthy   Orientation: alert and oriented to person, place, and time   Affect: appropriate   Gait: unimpaired       Right Foot/Ankle     Inspection and Palpation  Ecchymosis: none  Tenderness: bony tenderness (Tenderness to palpation around the distal fibula.)  Swelling: (Mild edema noted to the lateral ankle)  Arch: normal  Hammertoes: absent  Claw Toes: absent  Hallux valgus: no  Hallux limitus: no  Skin Exam: skin intact;     Neurovascular  Dorsalis pedis: 2+  Posterior tibial:  2+  Saphenous nerve sensation: normal  Tibial nerve sensation: normal  Superficial peroneal nerve sensation: normal  Deep peroneal nerve sensation: normal  Sural nerve sensation: normal    Muscle Strength  Ankle dorsiflexion: 5  Ankle plantar flexion: 5  Ankle inversion: 5  Ankle eversion: 5  Great toe extension: 5  Great toe flexion: 5    Range of Motion    Passive  Ankle dorsiflexion: pain  Ankle plantar flexion: pain  Ankle eversion: pain  Ankle inversion: pain      Tests  Anterior drawer: negative   Syndesmosis squeeze: negative   Calcaneal squeeze: negative   Talar tilt: negative           Physical Exam   Cardiovascular:   Pulses:       Dorsalis pedis pulses are 2+ on the right side.        Posterior tibial pulses are 2+ on the right side.   Musculoskeletal:        Right foot: There is bony tenderness.       Imaging: X-Ray Foot Complete Right  AP, Lat, and mortise views of the left foot are obtained and reviewed personally by me today. No fracture, subluxation, or other significant abnormality is identified. Small plantar and posterior calcaneal spurs are present. Joints and soft tissues are unremarkable.    Electronically Signed By: Zechariah Quijano DPM  X-Ray Ankle Complete Right  AP, Lat, and mortise views of the right ankle are obtained and reviewed personally by me today. No fracture, subluxation, or other significant abnormality is identified. Joints and soft tissues are unremarkable.    Electronically Signed By: Zechariah Quijano DPM               Assessment:       1. Right ankle pain, unspecified chronicity - Right Foot    2. Contusion of bone - Right Foot      Plan:   Right ankle pain, unspecified chronicity - Right Foot  -     X-Ray Ankle Complete Right  -     X-Ray Foot Complete Right    Contusion of bone - Right Foot      Follow-up if symptoms worsen or fail to improve.    Procedures - None    Patient given a lace up ankle brace and instructed to weight-bear with the brace to limit motion of the ankle.  No  barefoot  Recommend supportive running shoe  Ice to painful area 15 minutes at a time  Use an Ace wrap for any excessive swelling  Avoid heavy impact activities    Counseling:     I provided patient education verbally regarding:   Patient diagnosis, treatment options, as well as alternatives, risks, and benefits.     This note was created using Dragon voice recognition software that occasionally misinterpreted phrases or words.

## 2019-03-24 RX ORDER — SERTRALINE HYDROCHLORIDE 50 MG/1
TABLET, FILM COATED ORAL
Qty: 90 TABLET | Refills: 1 | Status: SHIPPED | OUTPATIENT
Start: 2019-03-24 | End: 2019-09-17 | Stop reason: SDUPTHER

## 2019-04-17 RX ORDER — AMLODIPINE AND OLMESARTAN MEDOXOMIL 5; 20 MG/1; MG/1
1 TABLET ORAL DAILY
Qty: 90 TABLET | Refills: 1 | Status: SHIPPED | OUTPATIENT
Start: 2019-04-17 | End: 2019-10-01 | Stop reason: SDUPTHER

## 2019-07-01 ENCOUNTER — TELEPHONE (OUTPATIENT)
Dept: PODIATRY | Facility: CLINIC | Age: 69
End: 2019-07-01

## 2019-07-01 NOTE — TELEPHONE ENCOUNTER
Spoke with patient and informed her to put her bootcast on ice it and take an anti-inflammatory for the swelling and elevate it. Patient said her  tape the toe next the the other toe as well. Told Mrs Vega to see how she feels by the end of the week if its not better to call and schedule an appt next week with Dr Quijano to come in and get xrays done. Offered her to come in today 7/1/19 but had no transporation and didn't want to see any other dr then hers

## 2019-07-01 NOTE — TELEPHONE ENCOUNTER
----- Message from Zechariah Quijano DPM sent at 7/1/2019  2:46 PM CDT -----  Contact: patient  She should have a boot or surgery shoe. I would recommend using one of those. Otherwise, it's really just a matter of ice and anti-inflammatories. If it's not getting better she can come in next week if she wants to wait to see me.     ----- Message -----  From: Maria D Ely  Sent: 7/1/2019  11:17 AM  To: Zechariah Quijano DPM    Mrs. Jia Vega called and notified me that she had a glass jar fall and hit her R1st toe. She has no transporation today and refuses to come in to see Dr ORELLANA. She's wondering if there's anything that she could do besides icing it and wrapping it. Told her you would more then likely need to see her to get xrays to make sure it isnt broken, but she doesn't believ it is because she can walk on it; however it just hurts and its bruised. If you can let me know what to tell her to do other then come in.     Nadja Leggett

## 2019-08-26 DIAGNOSIS — Z78.0 MENOPAUSE PRESENT: ICD-10-CM

## 2019-08-26 RX ORDER — ESTRADIOL 0.1 MG/D
1 PATCH TRANSDERMAL
Qty: 12 PATCH | Refills: 0 | Status: SHIPPED | OUTPATIENT
Start: 2019-08-26 | End: 2019-10-01 | Stop reason: SDUPTHER

## 2019-09-17 DIAGNOSIS — N39.44 NOCTURNAL ENURESIS: ICD-10-CM

## 2019-09-17 RX ORDER — SERTRALINE HYDROCHLORIDE 50 MG/1
TABLET, FILM COATED ORAL
Qty: 30 TABLET | Refills: 1 | Status: SHIPPED | OUTPATIENT
Start: 2019-09-17 | End: 2019-09-17 | Stop reason: SDUPTHER

## 2019-09-20 ENCOUNTER — PATIENT MESSAGE (OUTPATIENT)
Dept: FAMILY MEDICINE | Facility: CLINIC | Age: 69
End: 2019-09-20

## 2019-09-20 RX ORDER — OXYBUTYNIN CHLORIDE 5 MG/1
TABLET ORAL
Qty: 90 TABLET | Refills: 0 | OUTPATIENT
Start: 2019-09-20

## 2019-09-20 RX ORDER — SERTRALINE HYDROCHLORIDE 50 MG/1
TABLET, FILM COATED ORAL
Qty: 90 TABLET | Refills: 1 | Status: SHIPPED | OUTPATIENT
Start: 2019-09-20 | End: 2019-10-01 | Stop reason: SDUPTHER

## 2019-10-01 ENCOUNTER — OFFICE VISIT (OUTPATIENT)
Dept: FAMILY MEDICINE | Facility: CLINIC | Age: 69
End: 2019-10-01
Payer: MEDICARE

## 2019-10-01 VITALS
DIASTOLIC BLOOD PRESSURE: 78 MMHG | WEIGHT: 199 LBS | RESPIRATION RATE: 16 BRPM | BODY MASS INDEX: 31.23 KG/M2 | HEIGHT: 67 IN | HEART RATE: 97 BPM | SYSTOLIC BLOOD PRESSURE: 120 MMHG

## 2019-10-01 DIAGNOSIS — K21.9 GASTROESOPHAGEAL REFLUX DISEASE WITHOUT ESOPHAGITIS: ICD-10-CM

## 2019-10-01 DIAGNOSIS — Z78.0 ASYMPTOMATIC MENOPAUSE: ICD-10-CM

## 2019-10-01 DIAGNOSIS — E78.00 HYPERCHOLESTEROLEMIA: ICD-10-CM

## 2019-10-01 DIAGNOSIS — J43.1 PANLOBULAR EMPHYSEMA: ICD-10-CM

## 2019-10-01 DIAGNOSIS — F17.200 CURRENT SMOKER: ICD-10-CM

## 2019-10-01 DIAGNOSIS — I10 BENIGN ESSENTIAL HYPERTENSION: Primary | ICD-10-CM

## 2019-10-01 DIAGNOSIS — Z78.0 MENOPAUSE PRESENT: ICD-10-CM

## 2019-10-01 DIAGNOSIS — F34.1 DYSTHYMIA: ICD-10-CM

## 2019-10-01 PROBLEM — J20.9 ACUTE BRONCHITIS: Status: RESOLVED | Noted: 2018-08-23 | Resolved: 2019-10-01

## 2019-10-01 PROBLEM — R10.9 ABDOMINAL CRAMPING: Status: RESOLVED | Noted: 2018-04-17 | Resolved: 2019-10-01

## 2019-10-01 PROCEDURE — 99214 OFFICE O/P EST MOD 30 MIN: CPT | Mod: S$PBB,,, | Performed by: INTERNAL MEDICINE

## 2019-10-01 PROCEDURE — 99214 PR OFFICE/OUTPT VISIT, EST, LEVL IV, 30-39 MIN: ICD-10-PCS | Mod: S$PBB,,, | Performed by: INTERNAL MEDICINE

## 2019-10-01 PROCEDURE — 99214 OFFICE O/P EST MOD 30 MIN: CPT | Performed by: INTERNAL MEDICINE

## 2019-10-01 RX ORDER — ESTRADIOL 0.1 MG/D
1 PATCH TRANSDERMAL
Qty: 12 PATCH | Refills: 2 | Status: SHIPPED | OUTPATIENT
Start: 2019-10-01 | End: 2020-09-22

## 2019-10-01 RX ORDER — AMLODIPINE AND OLMESARTAN MEDOXOMIL 5; 20 MG/1; MG/1
1 TABLET ORAL DAILY
Qty: 90 TABLET | Refills: 1 | Status: SHIPPED | OUTPATIENT
Start: 2019-10-01 | End: 2020-04-05

## 2019-10-01 RX ORDER — PRAVASTATIN SODIUM 80 MG/1
80 TABLET ORAL NIGHTLY
Qty: 90 TABLET | Refills: 3 | Status: SHIPPED | OUTPATIENT
Start: 2019-10-01 | End: 2021-01-08 | Stop reason: SDUPTHER

## 2019-10-01 RX ORDER — SERTRALINE HYDROCHLORIDE 50 MG/1
50 TABLET, FILM COATED ORAL NIGHTLY
Qty: 90 TABLET | Refills: 1 | Status: SHIPPED | OUTPATIENT
Start: 2019-10-01 | End: 2020-04-09

## 2019-10-01 RX ORDER — ESOMEPRAZOLE MAGNESIUM 40 MG/1
40 CAPSULE, DELAYED RELEASE ORAL
Qty: 90 CAPSULE | Refills: 1 | Status: SHIPPED | OUTPATIENT
Start: 2019-10-01 | End: 2021-01-08 | Stop reason: SDUPTHER

## 2019-10-01 RX ORDER — ALBUTEROL SULFATE 90 UG/1
AEROSOL, METERED RESPIRATORY (INHALATION)
Qty: 8.5 G | Refills: 2 | Status: SHIPPED | OUTPATIENT
Start: 2019-10-01 | End: 2020-10-08

## 2019-10-01 RX ORDER — ASPIRIN 81 MG/1
81 TABLET ORAL DAILY
Qty: 100 TABLET | Refills: 4 | Status: SHIPPED | OUTPATIENT
Start: 2019-10-01

## 2019-10-01 NOTE — PROGRESS NOTES
Subjective:       Patient ID: Jia Vega is a 69 y.o. female.    Chief Complaint: Thyroid Problem; Gastroesophageal Reflux; Urinary Incontinence; Hypertension; Hyperlipidemia; Screening for osteoporosis; and Emphysema    Miss Ro is a 69-year-old  female who comes for follow-up approximately after more than a year.  Underlying medical issues include hypertension, hyperlipidemia, gastroesophageal reflux, mild COPD and cough related to longstanding history of smoking.  She also has mild underlying mood disorder for which she takes Zoloft for arthritic symptoms she takes Celebrex and reflux symptoms she takes Nexium.  She is also on chronic aspirin.    She will be due for bone density testing.  Risk factors osteoporosis are longstanding history of smoking and advancing age.  She is currently not on any steroid medications.    She also gets hot flashes and she takes estradiol patch for the same.  This new patch does not seem to be fitting her well.  She states that the previous patch was smaller in size and perhaps less than strength and seemed to do okay.    While her compliance is somewhat patchy to follow-up and medications, at this phase of her life she has some difficulty remembering what she is taking medications for.  For example she did not know if she was taking Zoloft and the reason for this medication.  She just simply tells me to send all the medications but could not recall the name of the medications that needed to be sent.    Hypertension   This is a chronic problem. The current episode started more than 1 year ago. The problem is controlled. Associated symptoms include anxiety, malaise/fatigue and shortness of breath. Pertinent negatives include no headaches or palpitations. Risk factors for coronary artery disease include sedentary lifestyle, dyslipidemia and smoking/tobacco exposure. Past treatments include calcium channel blockers and ACE inhibitors. The current treatment  provides moderate improvement. Compliance problems include psychosocial issues.  There is no history of pheochromocytoma or renovascular disease.   Hyperlipidemia   This is a chronic problem. The current episode started more than 1 year ago. The problem is controlled. Exacerbating diseases include obesity. Associated symptoms include shortness of breath. Current antihyperlipidemic treatment includes statins. The current treatment provides moderate improvement of lipids. Risk factors for coronary artery disease include a sedentary lifestyle, hypertension, obesity, dyslipidemia and post-menopausal.   Gastroesophageal Reflux   She complains of coughing and heartburn. She reports no abdominal pain or no nausea. Slightly dysphoric mood.  Does not remember if she is on Zoloft.. This is a chronic problem. The problem occurs occasionally. The problem has been waxing and waning. Pertinent negatives include no fatigue. Risk factors include smoking/tobacco exposure, obesity, lack of exercise and caffeine use. She has tried a PPI for the symptoms. The treatment provided moderate relief. Past invasive treatments do not include gastroplasty, gastroplication or reflux surgery.   Anxiety   Presents for follow-up visit. Symptoms include compulsions, irritability, obsessions and shortness of breath. Patient reports no dizziness, nausea or palpitations. Primary symptoms comment: Slightly dysphoric mood.  Does not remember if she is on Zoloft.. Symptoms occur occasionally. The severity of symptoms is moderate. The quality of sleep is fair.     Compliance with medications is 26-50%.       Past Medical History:   Diagnosis Date    Arthritis     Asthma     Full dentures     GERD (gastroesophageal reflux disease)     Hypertension     Seasonal allergies     Wears glasses      Social History     Socioeconomic History    Marital status:      Spouse name: Not on file    Number of children: 2    Years of education: Not on file     Highest education level: Not on file   Occupational History    Occupation: Seamstress retired   Social Needs    Financial resource strain: Not on file    Food insecurity:     Worry: Not on file     Inability: Not on file    Transportation needs:     Medical: Not on file     Non-medical: Not on file   Tobacco Use    Smoking status: Current Every Day Smoker     Packs/day: 0.25     Years: 40.00     Pack years: 10.00     Types: Cigarettes    Smokeless tobacco: Never Used    Tobacco comment: patient states 3 cigs per day   Substance and Sexual Activity    Alcohol use: No     Alcohol/week: 1.7 standard drinks     Types: 2 Standard drinks or equivalent per week    Drug use: No    Sexual activity: Never   Lifestyle    Physical activity:     Days per week: Not on file     Minutes per session: Not on file    Stress: To some extent   Relationships    Social connections:     Talks on phone: Not on file     Gets together: Not on file     Attends [employer] service: Not on file     Active member of club or organization: Not on file     Attends meetings of clubs or organizations: Not on file     Relationship status: Not on file   Other Topics Concern    Not on file   Social History Narrative    2 boys from previous relationship     Past Surgical History:   Procedure Laterality Date    BACK SURGERY      cubital tunnel release  left    HAND SURGERY      right and left: ganglion cyst    HYSTERECTOMY  BSO    NECK SURGERY       Family History   Family history unknown: Yes       Review of Systems   Constitutional: Positive for irritability, malaise/fatigue and unexpected weight change (wt gain.19 lbs since 1 year). Negative for activity change, appetite change, fatigue and fever.   HENT: Negative for congestion, drooling and postnasal drip.    Eyes: Negative for pain, discharge and visual disturbance.        Patient has bilateral cataracts.   Respiratory: Positive for cough and shortness of breath. Negative for  "chest tightness.         Long-standing history of smoking.  Episodic cough and shortness of breath.  No major change   Cardiovascular: Negative for palpitations and leg swelling.        Patient has hypertension   Gastrointestinal: Positive for heartburn. Negative for abdominal distention, abdominal pain, anal bleeding, constipation, diarrhea, nausea, rectal pain and vomiting.   Endocrine: Positive for heat intolerance.        Patient has dyslipidemia.Hot flashes   Genitourinary: Negative for difficulty urinating, dysuria and pelvic pain.        Nocturia and enuresis.   Musculoskeletal: Negative for arthralgias.   Skin:        Patient gets intermittent skin infections and groin.   Allergic/Immunologic: Positive for environmental allergies.   Neurological: Negative for dizziness, seizures and headaches.   Psychiatric/Behavioral: Negative for agitation and hallucinations. The patient is hyperactive.          Objective:      Blood pressure 120/78, pulse 97, resp. rate 16, height 5' 7" (1.702 m), weight 90.3 kg (199 lb). Body mass index is 31.17 kg/m².  Physical Exam   Constitutional: She appears well-developed and well-nourished. She is cooperative. No distress.   HENT:   Head: Normocephalic and atraumatic.   Nose: Mucosal edema and rhinorrhea present.   Mouth/Throat: She has dentures (upper and lower).   Eyes: Conjunctivae, EOM and lids are normal. Lids are everted and swept, no foreign bodies found. Right pupil is round and reactive. Left pupil is round and reactive.   Neck: Trachea normal. Neck supple.   Cardiovascular: Normal rate, regular rhythm, S1 normal, S2 normal and normal heart sounds.   Pulmonary/Chest: No respiratory distress. She has decreased breath sounds in the right middle field and the left middle field. She has no wheezes. She has no rales. She exhibits no tenderness.   Abdominal: Soft. There is no hepatosplenomegaly. There is no tenderness. There is no guarding, no tenderness at McBurney's point and " negative Sal's sign. No hernia. Hernia confirmed negative in the ventral area.   She has no tenderness on palpation. No tenderness on palpation.   Musculoskeletal: She exhibits no edema, tenderness or deformity.   Lymphadenopathy:     She has no cervical adenopathy.   Neurological: She is alert.   Skin: Skin is warm and dry.   Psychiatric: Judgment and thought content normal. She is not slowed and not withdrawn.   Somewhat animated intermittently.  Probably some cognitive issues.  Had difficult time remembering the name of her medications and the reasons for her medications.   Nursing note and vitals reviewed.        Assessment:       1. Benign essential hypertension    2. Hypercholesterolemia    3. Panlobular emphysema    4. Asymptomatic menopause    5. Current smoker    6. Dysthymia    7. Menopause present    8. Gastroesophageal reflux disease without esophagitis           No visits with results within 3 Month(s) from this visit.   Latest known visit with results is:   Orders Only on 04/25/2018   Component Date Value Ref Range Status    WBC 04/25/2018 10.0  5.0 - 10.0 K/uL Final    RBC 04/25/2018 4.93  3.50 - 5.50 M/uL Final    Hemoglobin 04/25/2018 13.9  12.0 - 15.0 g/dL Final    Hematocrit 04/25/2018 41.5  36.0 - 48.0 % Final    Mean Corpuscular Volume 04/25/2018 84.2  79.0 - 98.0 fL Final    Mean Corpuscular Hemoglobin 04/25/2018 28.2  25.0 - 35.0 pg Final    Mean Corpuscular Hemoglobin Conc 04/25/2018 33.5  31.0 - 36.0 g/dL Final    RDW 04/25/2018 13.8  11.7 - 14.9 % Final    Platelets 04/25/2018 297  140 - 440 K/uL Final    MPV 04/25/2018 8.5* 8.8 - 12.7 fL Final    Gran% 04/25/2018 65.0  % Final    Lymph% 04/25/2018 24.7  % Final    Mono% 04/25/2018 7.4  % Final    Eosinophil% 04/25/2018 1.8  % Final    Basophil% 04/25/2018 0.5  % Final    Gran # (ANC) 04/25/2018 6.5  1.4 - 6.5 K/uL Final    Lymph # 04/25/2018 2.5  1.2 - 3.4 K/uL Final    Mono # 04/25/2018 0.7* 0.1 - 0.6 K/uL Final     Eos # 04/25/2018 0.2  0.0 - 0.7 K/uL Final    Baso # 04/25/2018 0.1  0.0 - 0.2 K/uL Final    Immature Grans (Abs) 04/25/2018 0.1  0.0 - 1.0 K/uL Final    Immature Granulocytes 04/25/2018 0.6  % Final    nRBC% 04/25/2018 0  % Final         Plan:           Benign essential hypertension  -     Comprehensive metabolic panel; Future; Expected date: 10/01/2019  -     Microalbumin/creatinine urine ratio; Future; Expected date: 10/01/2019  -     amlodipine-olmesartan (ALICE) 5-20 mg per tablet; Take 1 tablet by mouth Daily.  Dispense: 90 tablet; Refill: 1    Hypercholesterolemia  -     Lipid panel; Future; Expected date: 10/01/2019  -     aspirin (ECOTRIN) 81 MG EC tablet; Take 1 tablet (81 mg total) by mouth Daily.  Dispense: 100 tablet; Refill: 4  -     pravastatin (PRAVACHOL) 80 MG tablet; Take 1 tablet (80 mg total) by mouth every evening.  Dispense: 90 tablet; Refill: 3    Panlobular emphysema  -     albuterol (PROAIR HFA) 90 mcg/actuation inhaler; INHALE 1 PUFF BY MOUTH EVERY 4 TO 6 HOURS AS NEEDED FOR WHEEZING AND SHORTNESS OF BREATH  Dispense: 8.5 g; Refill: 2    Asymptomatic menopause  -     DXA Bone Density Appendicular Skeleton    Current smoker    Dysthymia  -     sertraline (ZOLOFT) 50 MG tablet; Take 1 tablet (50 mg total) by mouth nightly.  Dispense: 90 tablet; Refill: 1    Menopause present  -     estradiol 0.1 mg/24 hr td ptwk 0.1 mg/24 hr PTWK; Place 1 patch onto the skin every 7 days. Please quit smoking  Dispense: 12 patch; Refill: 2    Gastroesophageal reflux disease without esophagitis  -     esomeprazole (NEXIUM) 40 MG capsule; Take 1 capsule (40 mg total) by mouth before breakfast.  Dispense: 90 capsule; Refill: 1      Patient comes after approximately 1 year.  Medical issues and medications have been reviewed.  Previous labs have been reviewed.  New lab orders have been given.  Compliance has been urged.    She is concerned about weight gain.  She wants to going to key to diet pill.  I do not  have any objection as long his blood pressures are okay.  I have advised her to quit smoking.    Patient has been advised to watch diet and exercise. Avoid fried and fatty food. Compliance to medications and follow up urged.  Advised Ms. Vega about age and season appropriate immunizations/ cancer screenings.  Also seasonal influenza vaccine, update on tetanus diphtheria vaccination every 10 years.    Patient has been advised about reflux measures and avoiding excess caffeine and smoking also contributes to reflux.  Minimize use of PPIs.    Follow-up in 6 months or earlier as needed.  Compliance is a major issue.    Spent susan 25 minutes with patient which involved review of pts medical conditions, labs, medications and with 50% of time face-to-face discussion about medical problems, management and any applicable changes.            Current Outpatient Medications:     albuterol (PROAIR HFA) 90 mcg/actuation inhaler, INHALE 1 PUFF BY MOUTH EVERY 4 TO 6 HOURS AS NEEDED FOR WHEEZING AND SHORTNESS OF BREATH, Disp: 8.5 g, Rfl: 2    amlodipine-olmesartan (ALICE) 5-20 mg per tablet, Take 1 tablet by mouth Daily., Disp: 90 tablet, Rfl: 1    aspirin (ECOTRIN) 81 MG EC tablet, Take 1 tablet (81 mg total) by mouth Daily., Disp: 100 tablet, Rfl: 4    celecoxib (CELEBREX) 200 MG capsule, 1 capsule once daily., Disp: , Rfl: 0    esomeprazole (NEXIUM) 40 MG capsule, Take 1 capsule (40 mg total) by mouth before breakfast., Disp: 90 capsule, Rfl: 1    estradiol 0.1 mg/24 hr td ptwk 0.1 mg/24 hr PTWK, Place 1 patch onto the skin every 7 days. Please quit smoking, Disp: 12 patch, Rfl: 2    ibuprofen (ADVIL,MOTRIN) 600 MG tablet, 1 tablet daily as needed., Disp: , Rfl: 1    multivitamin capsule, Take 1 capsule by mouth once daily.  , Disp: , Rfl:     mupirocin calcium 2% nasl oint (BACTROBAN) 2 % Oint, by Nasal route 2 (two) times daily., Disp: 22 g, Rfl: 0    pravastatin (PRAVACHOL) 80 MG tablet, Take 1 tablet (80 mg  total) by mouth every evening., Disp: 90 tablet, Rfl: 3    sertraline (ZOLOFT) 50 MG tablet, Take 1 tablet (50 mg total) by mouth nightly., Disp: 90 tablet, Rfl: 1    VITAMIN B COMPLEX (B COMPLEX 1 ORAL), Take 1 tablet by mouth Daily., Disp: , Rfl:

## 2019-10-01 NOTE — PATIENT INSTRUCTIONS
Taking Your Blood Pressure  Blood pressure is the force of blood against the artery wall as it moves from the heart through the blood vessels. You can take your own blood pressure reading using a digital monitor. Take your readings the same each time, using the same arm. Take readings as often as your healthcare provider instructs.  About blood pressure monitors  Blood pressure monitors are designed for certain ages and cases. You can find monitors for older adults, for pregnant women, and for children. Make sure the one you choose is the right one for your age and situation.  The American Heart Association recommends an automatic cuff monitor that fits on your upper arm (bicep). The cuff should fit your arm size. A cuff thats too large or too small will not give an accurate reading. Measure around your upper arm to find your size.  Monitors that attach to your finger or wrist are not as accurate as monitors for your upper arm.  Ask your healthcare provider for help in choosing a monitor. Bring your monitor to your next provider visit if you need help in using it the correct way.  The steps below are general instructions for using an automatic digital monitor.  Step 1. Relax    · Take your blood pressure at the same time every day, such as in the morning or evening, or at the time your healthcare provider recommends.  · Wait at least a half-hour after smoking, eating, or exercising. Don't drink coffee, tea, soda, or other caffeinated beverages before checking your blood pressure.  · Sit comfortably at a table with both feet on the floor. Do not cross your legs or feet. Place the monitor near you.  · Rest for a few minutes before you begin.  Step 2. Wrap the cuff    · Place your arm on the table, palm up. Your arm should be at the level of your heart. Wrap the cuff around your upper arm, just above your elbow. Its best done on bare skin, not over clothing. Most cuffs will indicate where the brachial artery (the  blood vessel in the middle of the arm at the inner side of the elbow) should line up with the cuff. Look in your monitor's instruction booklet for an illustration. You can also bring your cuff to your healthcare provider and have them show you how to correctly place the cuff.  Step 3. Inflate the cuff    · Push the button that starts the pump.  · The cuff will tighten, then loosen.  · The numbers will change. When they stop changing, your blood pressure reading will appear.  · Take 2 or 3 readings one minute apart.  Step 4. Write down the results of each reading    · Write down your blood pressure numbers for each reading. Note the date and time. Keep your results in one place, such as a notebook. Even if your monitor has a built-in memory, keep a hard copy of the readings.  · Remove the cuff from your arm. Turn off the machine.  · Bring your blood pressure records with your healthcare providers at each visit.  · If you start a new blood pressure medicine, note the day you started the new medicine. Also note the day if you change the dose of your medicine. This information goes on your blood pressure recording sheet. This will help your healthcare provider monitor how well the medicine changes are working.  · Ask your healthcare provider what numbers should prompt you to call him or her. Also ask what numbers should prompt you to get help right away.  Date Last Reviewed: 11/1/2016  © 2438-0919 [employer]. [address] [place] [address], [place] [address]. All rights reserved. This information is not intended as a substitute for professional medical care. Always follow your healthcare professional's instructions.

## 2019-10-03 ENCOUNTER — HOSPITAL ENCOUNTER (OUTPATIENT)
Dept: RADIOLOGY | Facility: HOSPITAL | Age: 69
Discharge: HOME | End: 2019-10-03
Attending: INTERNAL MEDICINE
Payer: MEDICARE

## 2019-10-03 PROCEDURE — 77080 DXA BONE DENSITY AXIAL: CPT | Mod: TC,PO

## 2019-10-03 PROCEDURE — 77081 DXA BONE DENSITY APPENDICULR: CPT | Mod: TC,PO

## 2019-10-07 ENCOUNTER — TELEPHONE (OUTPATIENT)
Dept: FAMILY MEDICINE | Facility: CLINIC | Age: 69
End: 2019-10-07

## 2019-10-07 NOTE — TELEPHONE ENCOUNTER
----- Message from Patrick Olson MD sent at 10/4/2019  2:31 PM CDT -----  Bone density report is normal.    Next bone density in 3 years time.    Dr. Whitney RAMIREZ

## 2019-10-22 ENCOUNTER — TELEPHONE (OUTPATIENT)
Dept: FAMILY MEDICINE | Facility: CLINIC | Age: 69
End: 2019-10-22

## 2020-03-30 ENCOUNTER — TELEPHONE (OUTPATIENT)
Dept: FAMILY MEDICINE | Facility: CLINIC | Age: 70
End: 2020-03-30

## 2020-03-30 NOTE — TELEPHONE ENCOUNTER
"----- Message from Patrick Olson MD sent at 3/30/2020 12:34 PM CDT -----  I would recommend salt water gargles and steam inhalation twice a day.  Warm showers will also work the same day.  Twice a day.  She can also try some over-the-counter Flonase 2 sprays each nostril during the daytime and Zyrtec or Claritin at nighttime 10 mg each.    Dr. Whitney RAMIREZ  ----- Message -----  From: Mary Lozano LPN  Sent: 3/30/2020  11:02 AM CDT  To: Patrick Olson MD        ----- Message -----  From: [facility]  Sent: 3/30/2020  10:21 AM CDT  To: Whitney Nguyen Staff    Patient had an appointment on Wednesday 04/01/2020 but rescheduled to June. I offered her a virtual visit and she stated she is not up to date with technology and would rather come in when all the "covid 19 madness" slows down. She has had sinus issues for the past 6 months and wanted to know when she could take.    "

## 2020-04-05 DIAGNOSIS — I10 BENIGN ESSENTIAL HYPERTENSION: ICD-10-CM

## 2020-04-05 RX ORDER — AMLODIPINE AND OLMESARTAN MEDOXOMIL 5; 20 MG/1; MG/1
1 TABLET ORAL DAILY
Qty: 90 TABLET | Refills: 1 | Status: SHIPPED | OUTPATIENT
Start: 2020-04-05 | End: 2020-10-05 | Stop reason: SDUPTHER

## 2020-04-09 DIAGNOSIS — F34.1 DYSTHYMIA: ICD-10-CM

## 2020-04-09 RX ORDER — SERTRALINE HYDROCHLORIDE 50 MG/1
TABLET, FILM COATED ORAL
Qty: 90 TABLET | Refills: 1 | Status: SHIPPED | OUTPATIENT
Start: 2020-04-09 | End: 2020-10-14 | Stop reason: SDUPTHER

## 2020-10-05 DIAGNOSIS — I10 BENIGN ESSENTIAL HYPERTENSION: ICD-10-CM

## 2020-10-05 RX ORDER — AMLODIPINE AND OLMESARTAN MEDOXOMIL 5; 20 MG/1; MG/1
1 TABLET ORAL DAILY
Qty: 90 TABLET | Refills: 1 | Status: SHIPPED | OUTPATIENT
Start: 2020-10-05 | End: 2021-04-05 | Stop reason: SDUPTHER

## 2020-10-14 DIAGNOSIS — F34.1 DYSTHYMIA: ICD-10-CM

## 2020-10-14 RX ORDER — SERTRALINE HYDROCHLORIDE 50 MG/1
50 TABLET, FILM COATED ORAL NIGHTLY
Qty: 30 TABLET | Refills: 1 | Status: SHIPPED | OUTPATIENT
Start: 2020-10-14 | End: 2021-01-08 | Stop reason: SDUPTHER

## 2020-12-28 DIAGNOSIS — Z78.0 MENOPAUSE PRESENT: ICD-10-CM

## 2020-12-28 RX ORDER — ESTRADIOL 0.1 MG/D
PATCH TRANSDERMAL
Qty: 12 PATCH | Refills: 0 | Status: SHIPPED | OUTPATIENT
Start: 2020-12-28 | End: 2021-05-07

## 2021-01-01 ENCOUNTER — LAB VISIT (OUTPATIENT)
Dept: LAB | Facility: HOSPITAL | Age: 71
End: 2021-01-01
Attending: INTERNAL MEDICINE
Payer: MEDICARE

## 2021-01-01 DIAGNOSIS — E55.9 VITAMIN D DEFICIENCY: ICD-10-CM

## 2021-01-01 DIAGNOSIS — F34.1 DYSTHYMIA: ICD-10-CM

## 2021-01-01 DIAGNOSIS — E78.00 HYPERCHOLESTEROLEMIA: ICD-10-CM

## 2021-01-01 DIAGNOSIS — I10 BENIGN ESSENTIAL HYPERTENSION: Chronic | ICD-10-CM

## 2021-01-01 LAB
25(OH)D3+25(OH)D2 SERPL-MCNC: 56 NG/ML (ref 30–96)
ALBUMIN SERPL BCP-MCNC: 3.8 G/DL (ref 3.5–5.2)
ALBUMIN/CREAT UR: 30 UG/MG (ref 0–30)
ALP SERPL-CCNC: 80 U/L (ref 55–135)
ALT SERPL W/O P-5'-P-CCNC: 20 U/L (ref 10–44)
ANION GAP SERPL CALC-SCNC: 11 MMOL/L (ref 8–16)
AST SERPL-CCNC: 23 U/L (ref 10–40)
BILIRUB SERPL-MCNC: 0.6 MG/DL (ref 0.1–1)
BUN SERPL-MCNC: 13 MG/DL (ref 8–23)
CALCIUM SERPL-MCNC: 9.1 MG/DL (ref 8.7–10.5)
CHLORIDE SERPL-SCNC: 106 MMOL/L (ref 95–110)
CHOLEST SERPL-MCNC: 198 MG/DL (ref 120–199)
CHOLEST/HDLC SERPL: 4.2 {RATIO} (ref 2–5)
CO2 SERPL-SCNC: 23 MMOL/L (ref 23–29)
CREAT SERPL-MCNC: 0.7 MG/DL (ref 0.5–1.4)
CREAT UR-MCNC: 243 MG/DL (ref 15–325)
EST. GFR  (AFRICAN AMERICAN): >60 ML/MIN/1.73 M^2
EST. GFR  (NON AFRICAN AMERICAN): >60 ML/MIN/1.73 M^2
GLUCOSE SERPL-MCNC: 126 MG/DL (ref 70–110)
HDLC SERPL-MCNC: 47 MG/DL (ref 40–75)
HDLC SERPL: 23.7 % (ref 20–50)
LDLC SERPL CALC-MCNC: 128.4 MG/DL (ref 63–159)
MICROALBUMIN UR DL<=1MG/L-MCNC: 72.8 UG/ML
NONHDLC SERPL-MCNC: 151 MG/DL
POTASSIUM SERPL-SCNC: 4.1 MMOL/L (ref 3.5–5.1)
PROT SERPL-MCNC: 7.7 G/DL (ref 6–8.4)
SODIUM SERPL-SCNC: 140 MMOL/L (ref 136–145)
TRIGL SERPL-MCNC: 113 MG/DL (ref 30–150)

## 2021-01-01 PROCEDURE — 82306 VITAMIN D 25 HYDROXY: CPT | Performed by: INTERNAL MEDICINE

## 2021-01-01 PROCEDURE — 80061 LIPID PANEL: CPT | Performed by: INTERNAL MEDICINE

## 2021-01-01 PROCEDURE — 36415 COLL VENOUS BLD VENIPUNCTURE: CPT | Performed by: INTERNAL MEDICINE

## 2021-01-01 PROCEDURE — 80053 COMPREHEN METABOLIC PANEL: CPT | Performed by: INTERNAL MEDICINE

## 2021-01-01 PROCEDURE — 82570 ASSAY OF URINE CREATININE: CPT | Performed by: INTERNAL MEDICINE

## 2021-01-01 RX ORDER — SERTRALINE HYDROCHLORIDE 50 MG/1
50 TABLET, FILM COATED ORAL NIGHTLY
Qty: 90 TABLET | Refills: 0 | Status: SHIPPED | OUTPATIENT
Start: 2021-01-01 | End: 2022-01-01

## 2021-01-08 DIAGNOSIS — F34.1 DYSTHYMIA: ICD-10-CM

## 2021-01-08 DIAGNOSIS — E78.00 HYPERCHOLESTEROLEMIA: ICD-10-CM

## 2021-01-08 DIAGNOSIS — K21.9 GASTROESOPHAGEAL REFLUX DISEASE WITHOUT ESOPHAGITIS: ICD-10-CM

## 2021-01-08 RX ORDER — SERTRALINE HYDROCHLORIDE 50 MG/1
50 TABLET, FILM COATED ORAL NIGHTLY
Qty: 90 TABLET | Refills: 1 | Status: SHIPPED | OUTPATIENT
Start: 2021-01-08 | End: 2021-05-07 | Stop reason: SDUPTHER

## 2021-01-08 RX ORDER — PRAVASTATIN SODIUM 80 MG/1
80 TABLET ORAL NIGHTLY
Qty: 90 TABLET | Refills: 1 | Status: SHIPPED | OUTPATIENT
Start: 2021-01-08 | End: 2021-04-07 | Stop reason: SDUPTHER

## 2021-01-08 RX ORDER — ESOMEPRAZOLE MAGNESIUM 40 MG/1
40 CAPSULE, DELAYED RELEASE ORAL
Qty: 90 CAPSULE | Refills: 1 | Status: SHIPPED | OUTPATIENT
Start: 2021-01-08 | End: 2021-05-07

## 2021-01-25 DIAGNOSIS — J43.1 PANLOBULAR EMPHYSEMA: ICD-10-CM

## 2021-01-25 RX ORDER — ALBUTEROL SULFATE 90 UG/1
AEROSOL, METERED RESPIRATORY (INHALATION)
Qty: 8.5 G | Refills: 2 | Status: SHIPPED | OUTPATIENT
Start: 2021-01-25 | End: 2022-01-01 | Stop reason: SDUPTHER

## 2021-04-05 DIAGNOSIS — I10 BENIGN ESSENTIAL HYPERTENSION: ICD-10-CM

## 2021-04-05 RX ORDER — AMLODIPINE AND OLMESARTAN MEDOXOMIL 5; 20 MG/1; MG/1
1 TABLET ORAL DAILY
Qty: 90 TABLET | Refills: 0 | Status: SHIPPED | OUTPATIENT
Start: 2021-04-05 | End: 2021-05-07 | Stop reason: SDUPTHER

## 2021-04-07 DIAGNOSIS — E78.00 HYPERCHOLESTEROLEMIA: ICD-10-CM

## 2021-04-07 RX ORDER — PRAVASTATIN SODIUM 80 MG/1
80 TABLET ORAL NIGHTLY
Qty: 90 TABLET | Refills: 1 | Status: SHIPPED | OUTPATIENT
Start: 2021-04-07 | End: 2021-05-07 | Stop reason: SDUPTHER

## 2021-05-03 ENCOUNTER — TELEPHONE (OUTPATIENT)
Dept: FAMILY MEDICINE | Facility: CLINIC | Age: 71
End: 2021-05-03

## 2021-05-07 ENCOUNTER — OFFICE VISIT (OUTPATIENT)
Dept: FAMILY MEDICINE | Facility: CLINIC | Age: 71
End: 2021-05-07
Payer: MEDICARE

## 2021-05-07 VITALS
DIASTOLIC BLOOD PRESSURE: 79 MMHG | WEIGHT: 207 LBS | BODY MASS INDEX: 32.49 KG/M2 | HEIGHT: 67 IN | SYSTOLIC BLOOD PRESSURE: 131 MMHG | HEART RATE: 98 BPM

## 2021-05-07 DIAGNOSIS — E55.9 VITAMIN D DEFICIENCY: ICD-10-CM

## 2021-05-07 DIAGNOSIS — E78.00 HYPERCHOLESTEROLEMIA: ICD-10-CM

## 2021-05-07 DIAGNOSIS — Z12.31 ENCOUNTER FOR SCREENING MAMMOGRAM FOR BREAST CANCER: ICD-10-CM

## 2021-05-07 DIAGNOSIS — F34.1 DYSTHYMIA: ICD-10-CM

## 2021-05-07 DIAGNOSIS — I10 BENIGN ESSENTIAL HYPERTENSION: Primary | Chronic | ICD-10-CM

## 2021-05-07 PROCEDURE — 99214 OFFICE O/P EST MOD 30 MIN: CPT | Mod: S$PBB,,, | Performed by: INTERNAL MEDICINE

## 2021-05-07 PROCEDURE — 99214 OFFICE O/P EST MOD 30 MIN: CPT | Performed by: INTERNAL MEDICINE

## 2021-05-07 PROCEDURE — 99214 PR OFFICE/OUTPT VISIT, EST, LEVL IV, 30-39 MIN: ICD-10-PCS | Mod: S$PBB,,, | Performed by: INTERNAL MEDICINE

## 2021-05-07 RX ORDER — AMLODIPINE AND OLMESARTAN MEDOXOMIL 5; 20 MG/1; MG/1
1 TABLET ORAL DAILY
Qty: 90 TABLET | Refills: 1 | Status: SHIPPED | OUTPATIENT
Start: 2021-05-07 | End: 2022-01-01 | Stop reason: SDUPTHER

## 2021-05-07 RX ORDER — OMEPRAZOLE 40 MG/1
40 CAPSULE, DELAYED RELEASE ORAL DAILY
COMMUNITY
End: 2022-01-01 | Stop reason: SDUPTHER

## 2021-05-07 RX ORDER — PRAVASTATIN SODIUM 80 MG/1
80 TABLET ORAL NIGHTLY
Qty: 90 TABLET | Refills: 1 | Status: SHIPPED | OUTPATIENT
Start: 2021-05-07 | End: 2022-01-01 | Stop reason: SDUPTHER

## 2021-05-07 RX ORDER — SERTRALINE HYDROCHLORIDE 50 MG/1
50 TABLET, FILM COATED ORAL NIGHTLY
Qty: 90 TABLET | Refills: 1 | Status: SHIPPED | OUTPATIENT
Start: 2021-05-07 | End: 2021-01-01 | Stop reason: SDUPTHER

## 2021-05-14 ENCOUNTER — TELEPHONE (OUTPATIENT)
Dept: FAMILY MEDICINE | Facility: CLINIC | Age: 71
End: 2021-05-14

## 2021-05-14 DIAGNOSIS — N39.46 MIXED STRESS AND URGE URINARY INCONTINENCE: Primary | ICD-10-CM

## 2021-05-18 RX ORDER — OXYBUTYNIN CHLORIDE 5 MG/1
5 TABLET ORAL 2 TIMES DAILY
Qty: 60 TABLET | Refills: 5 | Status: SHIPPED | OUTPATIENT
Start: 2021-05-18 | End: 2021-01-01

## 2021-05-25 ENCOUNTER — HOSPITAL ENCOUNTER (OUTPATIENT)
Dept: RADIOLOGY | Facility: HOSPITAL | Age: 71
Discharge: HOME | End: 2021-05-25
Attending: INTERNAL MEDICINE
Payer: MEDICARE

## 2021-05-25 VITALS — HEIGHT: 67 IN | WEIGHT: 207 LBS | BODY MASS INDEX: 32.49 KG/M2

## 2021-05-25 DIAGNOSIS — Z12.31 ENCOUNTER FOR SCREENING MAMMOGRAM FOR BREAST CANCER: ICD-10-CM

## 2021-05-25 PROCEDURE — 77067 SCR MAMMO BI INCL CAD: CPT | Mod: TC,PO

## 2021-07-01 ENCOUNTER — PATIENT MESSAGE (OUTPATIENT)
Dept: ADMINISTRATIVE | Facility: OTHER | Age: 71
End: 2021-07-01

## 2022-01-01 ENCOUNTER — TELEPHONE (OUTPATIENT)
Dept: FAMILY MEDICINE | Facility: CLINIC | Age: 72
End: 2022-01-01

## 2022-01-01 ENCOUNTER — OFFICE VISIT (OUTPATIENT)
Dept: FAMILY MEDICINE | Facility: CLINIC | Age: 72
End: 2022-01-01
Payer: MEDICARE

## 2022-01-01 ENCOUNTER — HOSPITAL ENCOUNTER (INPATIENT)
Facility: HOSPITAL | Age: 72
LOS: 3 days | Discharge: HOME | DRG: 871 | End: 2022-05-18
Attending: EMERGENCY MEDICINE | Admitting: INTERNAL MEDICINE
Payer: MEDICARE

## 2022-01-01 ENCOUNTER — ANESTHESIA (OUTPATIENT)
Dept: SURGERY | Facility: HOSPITAL | Age: 72
DRG: 167 | End: 2022-01-01
Payer: MEDICARE

## 2022-01-01 ENCOUNTER — ANESTHESIA EVENT (OUTPATIENT)
Dept: SURGERY | Facility: HOSPITAL | Age: 72
DRG: 167 | End: 2022-01-01
Payer: MEDICARE

## 2022-01-01 ENCOUNTER — PATIENT MESSAGE (OUTPATIENT)
Dept: FAMILY MEDICINE | Facility: CLINIC | Age: 72
End: 2022-01-01

## 2022-01-01 ENCOUNTER — HOSPITAL ENCOUNTER (INPATIENT)
Facility: HOSPITAL | Age: 72
LOS: 5 days | Discharge: HOSPICE | DRG: 167 | End: 2022-06-14
Attending: EMERGENCY MEDICINE | Admitting: INTERNAL MEDICINE
Payer: MEDICARE

## 2022-01-01 VITALS
DIASTOLIC BLOOD PRESSURE: 82 MMHG | TEMPERATURE: 98 F | SYSTOLIC BLOOD PRESSURE: 132 MMHG | HEIGHT: 67 IN | HEART RATE: 95 BPM | BODY MASS INDEX: 25.06 KG/M2 | RESPIRATION RATE: 18 BRPM | OXYGEN SATURATION: 92 % | WEIGHT: 159.63 LBS

## 2022-01-01 VITALS
DIASTOLIC BLOOD PRESSURE: 60 MMHG | TEMPERATURE: 98 F | HEIGHT: 67 IN | SYSTOLIC BLOOD PRESSURE: 88 MMHG | OXYGEN SATURATION: 98 % | HEART RATE: 114 BPM | WEIGHT: 184 LBS | BODY MASS INDEX: 28.88 KG/M2

## 2022-01-01 VITALS
HEIGHT: 67 IN | WEIGHT: 171.75 LBS | RESPIRATION RATE: 22 BRPM | TEMPERATURE: 98 F | OXYGEN SATURATION: 97 % | SYSTOLIC BLOOD PRESSURE: 84 MMHG | HEART RATE: 72 BPM | DIASTOLIC BLOOD PRESSURE: 59 MMHG | BODY MASS INDEX: 26.96 KG/M2

## 2022-01-01 DIAGNOSIS — J18.9 PNEUMONIA DUE TO INFECTIOUS ORGANISM, UNSPECIFIED LATERALITY, UNSPECIFIED PART OF LUNG: ICD-10-CM

## 2022-01-01 DIAGNOSIS — J43.1 PANLOBULAR EMPHYSEMA: ICD-10-CM

## 2022-01-01 DIAGNOSIS — R06.02 SHORTNESS OF BREATH: ICD-10-CM

## 2022-01-01 DIAGNOSIS — E78.00 HYPERCHOLESTEROLEMIA: ICD-10-CM

## 2022-01-01 DIAGNOSIS — I10 BENIGN ESSENTIAL HYPERTENSION: Chronic | ICD-10-CM

## 2022-01-01 DIAGNOSIS — R06.02 SOB (SHORTNESS OF BREATH): ICD-10-CM

## 2022-01-01 DIAGNOSIS — J96.01 ACUTE RESPIRATORY FAILURE WITH HYPOXIA: ICD-10-CM

## 2022-01-01 DIAGNOSIS — R91.8 RIGHT LOWER LOBE LUNG MASS: ICD-10-CM

## 2022-01-01 DIAGNOSIS — J20.9 BRONCHITIS WITH BRONCHOSPASM: Primary | ICD-10-CM

## 2022-01-01 DIAGNOSIS — N39.46 MIXED STRESS AND URGE URINARY INCONTINENCE: ICD-10-CM

## 2022-01-01 DIAGNOSIS — J18.9 PNEUMONIA DUE TO INFECTIOUS ORGANISM, UNSPECIFIED LATERALITY, UNSPECIFIED PART OF LUNG: Primary | ICD-10-CM

## 2022-01-01 DIAGNOSIS — J18.9 PNEUMONIA OF RIGHT LOWER LOBE DUE TO INFECTIOUS ORGANISM: Primary | ICD-10-CM

## 2022-01-01 DIAGNOSIS — E83.52 HYPERCALCEMIA: ICD-10-CM

## 2022-01-01 DIAGNOSIS — C34.90 MALIGNANT NEOPLASM OF LUNG, UNSPECIFIED LATERALITY, UNSPECIFIED PART OF LUNG: ICD-10-CM

## 2022-01-01 DIAGNOSIS — J20.9 BRONCHITIS WITH BRONCHOSPASM: ICD-10-CM

## 2022-01-01 DIAGNOSIS — K21.9 GASTROESOPHAGEAL REFLUX DISEASE, UNSPECIFIED WHETHER ESOPHAGITIS PRESENT: ICD-10-CM

## 2022-01-01 DIAGNOSIS — E87.6 HYPOKALEMIA: ICD-10-CM

## 2022-01-01 DIAGNOSIS — J96.01 ACUTE HYPOXEMIC RESPIRATORY FAILURE: ICD-10-CM

## 2022-01-01 LAB
ABO GROUP BLD: NORMAL
ALBUMIN SERPL BCP-MCNC: 2 G/DL (ref 3.5–5.2)
ALBUMIN SERPL BCP-MCNC: 2 G/DL (ref 3.5–5.2)
ALBUMIN SERPL BCP-MCNC: 2.1 G/DL (ref 3.5–5.2)
ALBUMIN SERPL BCP-MCNC: 2.2 G/DL (ref 3.5–5.2)
ALBUMIN SERPL BCP-MCNC: 2.3 G/DL (ref 3.5–5.2)
ALBUMIN SERPL ELPH-MCNC: 2.2 G/DL (ref 2.9–4.4)
ALBUMIN/GLOB SERPL: 0.6 {RATIO} (ref 0.7–1.7)
ALLENS TEST: ABNORMAL
ALP SERPL-CCNC: 110 U/L (ref 55–135)
ALP SERPL-CCNC: 125 U/L (ref 55–135)
ALP SERPL-CCNC: 90 U/L (ref 55–135)
ALP SERPL-CCNC: 90 U/L (ref 55–135)
ALP SERPL-CCNC: 92 U/L (ref 55–135)
ALPHA1 GLOB SERPL ELPH-MCNC: 0.3 G/DL (ref 0–0.4)
ALPHA2 GLOB SERPL ELPH-MCNC: 1.1 G/DL (ref 0.4–1)
ALT SERPL W/O P-5'-P-CCNC: 18 U/L (ref 10–44)
ALT SERPL W/O P-5'-P-CCNC: 19 U/L (ref 10–44)
ALT SERPL W/O P-5'-P-CCNC: 19 U/L (ref 10–44)
AMPHET+METHAMPHET UR QL: NEGATIVE
ANION GAP SERPL CALC-SCNC: 10 MMOL/L (ref 8–16)
ANION GAP SERPL CALC-SCNC: 10 MMOL/L (ref 8–16)
ANION GAP SERPL CALC-SCNC: 13 MMOL/L (ref 8–16)
ANION GAP SERPL CALC-SCNC: 14 MMOL/L (ref 8–16)
ANION GAP SERPL CALC-SCNC: 15 MMOL/L (ref 8–16)
ANION GAP SERPL CALC-SCNC: 7 MMOL/L (ref 8–16)
ANION GAP SERPL CALC-SCNC: 7 MMOL/L (ref 8–16)
ANION GAP SERPL CALC-SCNC: 8 MMOL/L (ref 8–16)
ANION GAP SERPL CALC-SCNC: 8 MMOL/L (ref 8–16)
ANION GAP SERPL CALC-SCNC: 9 MMOL/L (ref 8–16)
ANION GAP SERPL CALC-SCNC: 9 MMOL/L (ref 8–16)
APTT PPP: 22 SEC (ref 23.3–35.1)
APTT PPP: 26.9 SEC (ref 23.3–35.1)
AST SERPL-CCNC: 19 U/L (ref 10–40)
AST SERPL-CCNC: 19 U/L (ref 10–40)
AST SERPL-CCNC: 20 U/L (ref 10–40)
AST SERPL-CCNC: 22 U/L (ref 10–40)
AST SERPL-CCNC: 29 U/L (ref 10–40)
B-GLOBULIN SERPL ELPH-MCNC: 1.3 G/DL (ref 0.7–1.3)
BACTERIA #/AREA URNS HPF: ABNORMAL /HPF
BACTERIA #/AREA URNS HPF: ABNORMAL /HPF
BACTERIA BLD CULT: NORMAL
BACTERIA SPEC AEROBE CULT: NO GROWTH
BACTERIA UR CULT: ABNORMAL
BARBITURATES UR QL SCN>200 NG/ML: NEGATIVE
BASOPHILS # BLD AUTO: 0.02 K/UL (ref 0–0.2)
BASOPHILS # BLD AUTO: 0.02 K/UL (ref 0–0.2)
BASOPHILS # BLD AUTO: 0.03 K/UL (ref 0–0.2)
BASOPHILS NFR BLD: 0.1 % (ref 0–1.9)
BASOPHILS NFR BLD: 0.2 % (ref 0–1.9)
BENZODIAZ UR QL SCN>200 NG/ML: NEGATIVE
BILIRUB SERPL-MCNC: 0.8 MG/DL (ref 0.1–1)
BILIRUB SERPL-MCNC: 0.9 MG/DL (ref 0.1–1)
BILIRUB UR QL STRIP: ABNORMAL
BILIRUB UR QL STRIP: NEGATIVE
BLD GP AB SCN CELLS X3 SERPL QL: NORMAL
BNP SERPL-MCNC: 45 PG/ML (ref 0–99)
BNP SERPL-MCNC: 94 PG/ML (ref 0–99)
BUN SERPL-MCNC: 17 MG/DL (ref 8–23)
BUN SERPL-MCNC: 18 MG/DL (ref 8–23)
BUN SERPL-MCNC: 18 MG/DL (ref 8–23)
BUN SERPL-MCNC: 19 MG/DL (ref 8–23)
BUN SERPL-MCNC: 22 MG/DL (ref 8–23)
BUN SERPL-MCNC: 23 MG/DL (ref 8–23)
BUN SERPL-MCNC: 26 MG/DL (ref 8–23)
BUN SERPL-MCNC: 26 MG/DL (ref 8–23)
BUN SERPL-MCNC: 30 MG/DL (ref 8–23)
BUN SERPL-MCNC: 31 MG/DL (ref 8–23)
BUN SERPL-MCNC: 32 MG/DL (ref 8–23)
BZE UR QL SCN: NEGATIVE
CALCIUM SERPL-MCNC: 10.1 MG/DL (ref 8.7–10.5)
CALCIUM SERPL-MCNC: 10.2 MG/DL (ref 8.7–10.5)
CALCIUM SERPL-MCNC: 11 MG/DL (ref 8.7–10.5)
CALCIUM SERPL-MCNC: 11.5 MG/DL (ref 8.7–10.5)
CALCIUM SERPL-MCNC: 11.8 MG/DL (ref 8.7–10.5)
CALCIUM SERPL-MCNC: 12.6 MG/DL (ref 8.7–10.5)
CALCIUM SERPL-MCNC: 13 MG/DL (ref 8.7–10.5)
CALCIUM SERPL-MCNC: 13.1 MG/DL (ref 8.7–10.5)
CALCIUM SERPL-MCNC: 14.3 MG/DL (ref 8.7–10.5)
CALCIUM SERPL-MCNC: 9.6 MG/DL (ref 8.7–10.5)
CALCIUM SERPL-MCNC: 9.9 MG/DL (ref 8.7–10.5)
CANNABINOIDS UR QL SCN: NEGATIVE
CHLORIDE SERPL-SCNC: 100 MMOL/L (ref 95–110)
CHLORIDE SERPL-SCNC: 90 MMOL/L (ref 95–110)
CHLORIDE SERPL-SCNC: 90 MMOL/L (ref 95–110)
CHLORIDE SERPL-SCNC: 91 MMOL/L (ref 95–110)
CHLORIDE SERPL-SCNC: 93 MMOL/L (ref 95–110)
CHLORIDE SERPL-SCNC: 94 MMOL/L (ref 95–110)
CHLORIDE SERPL-SCNC: 94 MMOL/L (ref 95–110)
CHLORIDE SERPL-SCNC: 96 MMOL/L (ref 95–110)
CHLORIDE SERPL-SCNC: 96 MMOL/L (ref 95–110)
CHLORIDE SERPL-SCNC: 97 MMOL/L (ref 95–110)
CHLORIDE SERPL-SCNC: 98 MMOL/L (ref 95–110)
CK SERPL-CCNC: 14 U/L (ref 20–180)
CLARITY UR: ABNORMAL
CLARITY UR: ABNORMAL
CO2 SERPL-SCNC: 27 MMOL/L (ref 23–29)
CO2 SERPL-SCNC: 28 MMOL/L (ref 23–29)
CO2 SERPL-SCNC: 29 MMOL/L (ref 23–29)
CO2 SERPL-SCNC: 29 MMOL/L (ref 23–29)
CO2 SERPL-SCNC: 30 MMOL/L (ref 23–29)
CO2 SERPL-SCNC: 30 MMOL/L (ref 23–29)
CO2 SERPL-SCNC: 31 MMOL/L (ref 23–29)
CO2 SERPL-SCNC: 32 MMOL/L (ref 23–29)
CO2 SERPL-SCNC: 32 MMOL/L (ref 23–29)
CO2 SERPL-SCNC: 33 MMOL/L (ref 23–29)
CO2 SERPL-SCNC: 34 MMOL/L (ref 23–29)
COLOR UR: YELLOW
COLOR UR: YELLOW
CREAT SERPL-MCNC: 0.5 MG/DL (ref 0.5–1.4)
CREAT SERPL-MCNC: 0.6 MG/DL (ref 0.5–1.4)
CREAT SERPL-MCNC: 0.7 MG/DL (ref 0.5–1.4)
CREAT SERPL-MCNC: 1 MG/DL (ref 0.5–1.4)
CREAT SERPL-MCNC: 1.1 MG/DL (ref 0.5–1.4)
CREAT SERPL-MCNC: 1.1 MG/DL (ref 0.5–1.4)
CREAT SERPL-MCNC: 1.2 MG/DL (ref 0.5–1.4)
CREAT UR-MCNC: 90 MG/DL (ref 15–325)
D DIMER PPP IA.FEU-MCNC: 2.37 UG/ML FEU
DELSYS: ABNORMAL
DIFFERENTIAL METHOD: ABNORMAL
EOSINOPHIL # BLD AUTO: 0 K/UL (ref 0–0.5)
EOSINOPHIL NFR BLD: 0.1 % (ref 0–8)
ERYTHROCYTE [DISTWIDTH] IN BLOOD BY AUTOMATED COUNT: 14.5 % (ref 11.5–14.5)
ERYTHROCYTE [DISTWIDTH] IN BLOOD BY AUTOMATED COUNT: 14.6 % (ref 11.5–14.5)
ERYTHROCYTE [DISTWIDTH] IN BLOOD BY AUTOMATED COUNT: 14.7 % (ref 11.5–14.5)
ERYTHROCYTE [DISTWIDTH] IN BLOOD BY AUTOMATED COUNT: 17.2 % (ref 11.5–14.5)
ERYTHROCYTE [DISTWIDTH] IN BLOOD BY AUTOMATED COUNT: 17.3 % (ref 11.5–14.5)
ERYTHROCYTE [DISTWIDTH] IN BLOOD BY AUTOMATED COUNT: 17.6 % (ref 11.5–14.5)
ERYTHROCYTE [DISTWIDTH] IN BLOOD BY AUTOMATED COUNT: 18.1 % (ref 11.5–14.5)
EST. GFR  (AFRICAN AMERICAN): 52.9 ML/MIN/1.73 M^2
EST. GFR  (AFRICAN AMERICAN): 58.8 ML/MIN/1.73 M^2
EST. GFR  (AFRICAN AMERICAN): 58.8 ML/MIN/1.73 M^2
EST. GFR  (AFRICAN AMERICAN): >60 ML/MIN/1.73 M^2
EST. GFR  (NON AFRICAN AMERICAN): 45.9 ML/MIN/1.73 M^2
EST. GFR  (NON AFRICAN AMERICAN): 51 ML/MIN/1.73 M^2
EST. GFR  (NON AFRICAN AMERICAN): 51 ML/MIN/1.73 M^2
EST. GFR  (NON AFRICAN AMERICAN): 57.2 ML/MIN/1.73 M^2
EST. GFR  (NON AFRICAN AMERICAN): >60 ML/MIN/1.73 M^2
FUNGUS SPEC CULT: NORMAL
GAMMA GLOB SERPL ELPH-MCNC: 1.3 G/DL (ref 0.4–1.8)
GLOBULIN SER CALC-MCNC: 4 G/DL (ref 2.2–3.9)
GLUCOSE SERPL-MCNC: 103 MG/DL (ref 70–110)
GLUCOSE SERPL-MCNC: 105 MG/DL (ref 70–110)
GLUCOSE SERPL-MCNC: 115 MG/DL (ref 70–110)
GLUCOSE SERPL-MCNC: 117 MG/DL (ref 70–110)
GLUCOSE SERPL-MCNC: 124 MG/DL (ref 70–110)
GLUCOSE SERPL-MCNC: 125 MG/DL (ref 70–110)
GLUCOSE SERPL-MCNC: 127 MG/DL (ref 70–110)
GLUCOSE SERPL-MCNC: 128 MG/DL (ref 70–110)
GLUCOSE SERPL-MCNC: 132 MG/DL (ref 70–110)
GLUCOSE SERPL-MCNC: 133 MG/DL (ref 70–110)
GLUCOSE SERPL-MCNC: 135 MG/DL (ref 70–110)
GLUCOSE SERPL-MCNC: 144 MG/DL (ref 70–110)
GLUCOSE SERPL-MCNC: 148 MG/DL (ref 70–110)
GLUCOSE SERPL-MCNC: 149 MG/DL (ref 70–110)
GLUCOSE SERPL-MCNC: 150 MG/DL (ref 70–110)
GLUCOSE SERPL-MCNC: 156 MG/DL (ref 70–110)
GLUCOSE SERPL-MCNC: 158 MG/DL (ref 70–110)
GLUCOSE SERPL-MCNC: 85 MG/DL (ref 70–110)
GLUCOSE SERPL-MCNC: 93 MG/DL (ref 70–110)
GLUCOSE SERPL-MCNC: 95 MG/DL (ref 70–110)
GLUCOSE UR QL STRIP: NEGATIVE
GLUCOSE UR QL STRIP: NEGATIVE
GRAM STN SPEC: NORMAL
HCO3 UR-SCNC: 36.1 MMOL/L (ref 24–28)
HCO3 UR-SCNC: 36.1 MMOL/L (ref 24–28)
HCT VFR BLD AUTO: 36.2 % (ref 37–48.5)
HCT VFR BLD AUTO: 37.6 % (ref 37–48.5)
HCT VFR BLD AUTO: 37.9 % (ref 37–48.5)
HCT VFR BLD AUTO: 38.5 % (ref 37–48.5)
HCT VFR BLD AUTO: 38.6 % (ref 37–48.5)
HCT VFR BLD AUTO: 40.9 % (ref 37–48.5)
HCT VFR BLD AUTO: 41 % (ref 37–48.5)
HCT VFR BLD AUTO: 42.3 % (ref 37–48.5)
HCT VFR BLD AUTO: 43.4 % (ref 37–48.5)
HCT VFR BLD CALC: 38 %PCV (ref 36–54)
HGB BLD-MCNC: 11.9 G/DL (ref 12–16)
HGB BLD-MCNC: 11.9 G/DL (ref 12–16)
HGB BLD-MCNC: 12 G/DL (ref 12–16)
HGB BLD-MCNC: 12.2 G/DL (ref 12–16)
HGB BLD-MCNC: 12.2 G/DL (ref 12–16)
HGB BLD-MCNC: 12.7 G/DL (ref 12–16)
HGB BLD-MCNC: 12.9 G/DL (ref 12–16)
HGB BLD-MCNC: 13.4 G/DL (ref 12–16)
HGB BLD-MCNC: 13.8 G/DL (ref 12–16)
HGB UR QL STRIP: ABNORMAL
HGB UR QL STRIP: NEGATIVE
HYALINE CASTS #/AREA URNS LPF: 2370 /LPF
HYALINE CASTS #/AREA URNS LPF: 8 /LPF
IMM GRANULOCYTES # BLD AUTO: 0.11 K/UL (ref 0–0.04)
IMM GRANULOCYTES # BLD AUTO: 0.13 K/UL (ref 0–0.04)
IMM GRANULOCYTES # BLD AUTO: 0.13 K/UL (ref 0–0.04)
IMM GRANULOCYTES # BLD AUTO: 0.22 K/UL (ref 0–0.04)
IMM GRANULOCYTES # BLD AUTO: 0.26 K/UL (ref 0–0.04)
IMM GRANULOCYTES NFR BLD AUTO: 0.7 % (ref 0–0.5)
IMM GRANULOCYTES NFR BLD AUTO: 0.7 % (ref 0–0.5)
IMM GRANULOCYTES NFR BLD AUTO: 0.9 % (ref 0–0.5)
IMM GRANULOCYTES NFR BLD AUTO: 1.6 % (ref 0–0.5)
IMM GRANULOCYTES NFR BLD AUTO: 1.8 % (ref 0–0.5)
INFLUENZA A, MOLECULAR: NEGATIVE
INFLUENZA A, MOLECULAR: NEGATIVE
INFLUENZA B, MOLECULAR: NEGATIVE
INFLUENZA B, MOLECULAR: NEGATIVE
INR PPP: 1.2
INR PPP: 1.2
KETONES UR QL STRIP: ABNORMAL
KETONES UR QL STRIP: NEGATIVE
KOH PREP SPEC: NORMAL
LABORATORY COMMENT REPORT: ABNORMAL
LACTATE SERPL-SCNC: 1.1 MMOL/L (ref 0.5–1.9)
LACTATE SERPL-SCNC: 1.5 MMOL/L (ref 0.5–1.9)
LACTATE SERPL-SCNC: 1.5 MMOL/L (ref 0.5–1.9)
LACTATE SERPL-SCNC: 2 MMOL/L (ref 0.5–1.9)
LACTATE SERPL-SCNC: 2.9 MMOL/L (ref 0.5–1.9)
LEUKOCYTE ESTERASE UR QL STRIP: ABNORMAL
LEUKOCYTE ESTERASE UR QL STRIP: NEGATIVE
LIPASE SERPL-CCNC: 26 U/L (ref 4–60)
LIPASE SERPL-CCNC: 29 U/L (ref 4–60)
LYMPHOCYTES # BLD AUTO: 0.8 K/UL (ref 1–4.8)
LYMPHOCYTES # BLD AUTO: 0.9 K/UL (ref 1–4.8)
LYMPHOCYTES # BLD AUTO: 1.1 K/UL (ref 1–4.8)
LYMPHOCYTES # BLD AUTO: 1.1 K/UL (ref 1–4.8)
LYMPHOCYTES # BLD AUTO: 1.9 K/UL (ref 1–4.8)
LYMPHOCYTES NFR BLD: 6 % (ref 18–48)
LYMPHOCYTES NFR BLD: 6.2 % (ref 18–48)
LYMPHOCYTES NFR BLD: 6.4 % (ref 18–48)
LYMPHOCYTES NFR BLD: 7.5 % (ref 18–48)
LYMPHOCYTES NFR BLD: 9.9 % (ref 18–48)
M PROTEIN SERPL ELPH-MCNC: ABNORMAL G/DL
MAGNESIUM SERPL-MCNC: 1.5 MG/DL (ref 1.6–2.6)
MAGNESIUM SERPL-MCNC: 1.5 MG/DL (ref 1.6–2.6)
MAGNESIUM SERPL-MCNC: 1.6 MG/DL (ref 1.6–2.6)
MAGNESIUM SERPL-MCNC: 1.9 MG/DL (ref 1.6–2.6)
MAGNESIUM SERPL-MCNC: 2 MG/DL (ref 1.6–2.6)
MCH RBC QN AUTO: 24.7 PG (ref 27–31)
MCH RBC QN AUTO: 24.9 PG (ref 27–31)
MCH RBC QN AUTO: 25 PG (ref 27–31)
MCH RBC QN AUTO: 25.1 PG (ref 27–31)
MCH RBC QN AUTO: 25.6 PG (ref 27–31)
MCH RBC QN AUTO: 25.7 PG (ref 27–31)
MCH RBC QN AUTO: 25.8 PG (ref 27–31)
MCHC RBC AUTO-ENTMCNC: 31.1 G/DL (ref 32–36)
MCHC RBC AUTO-ENTMCNC: 31.1 G/DL (ref 32–36)
MCHC RBC AUTO-ENTMCNC: 31.4 G/DL (ref 32–36)
MCHC RBC AUTO-ENTMCNC: 31.5 G/DL (ref 32–36)
MCHC RBC AUTO-ENTMCNC: 31.7 G/DL (ref 32–36)
MCHC RBC AUTO-ENTMCNC: 31.7 G/DL (ref 32–36)
MCHC RBC AUTO-ENTMCNC: 31.8 G/DL (ref 32–36)
MCHC RBC AUTO-ENTMCNC: 32.4 G/DL (ref 32–36)
MCHC RBC AUTO-ENTMCNC: 32.9 G/DL (ref 32–36)
MCV RBC AUTO: 78 FL (ref 82–98)
MCV RBC AUTO: 78 FL (ref 82–98)
MCV RBC AUTO: 79 FL (ref 82–98)
MCV RBC AUTO: 79 FL (ref 82–98)
MCV RBC AUTO: 80 FL (ref 82–98)
MCV RBC AUTO: 80 FL (ref 82–98)
MCV RBC AUTO: 81 FL (ref 82–98)
MICROSCOPIC COMMENT: ABNORMAL
MICROSCOPIC COMMENT: ABNORMAL
MONOCYTES # BLD AUTO: 0.1 K/UL (ref 0.3–1)
MONOCYTES # BLD AUTO: 0.7 K/UL (ref 0.3–1)
MONOCYTES # BLD AUTO: 0.9 K/UL (ref 0.3–1)
MONOCYTES # BLD AUTO: 0.9 K/UL (ref 0.3–1)
MONOCYTES # BLD AUTO: 1.1 K/UL (ref 0.3–1)
MONOCYTES NFR BLD: 1 % (ref 4–15)
MONOCYTES NFR BLD: 4.5 % (ref 4–15)
MONOCYTES NFR BLD: 4.8 % (ref 4–15)
MONOCYTES NFR BLD: 5.8 % (ref 4–15)
MONOCYTES NFR BLD: 6.1 % (ref 4–15)
NEUTROPHILS # BLD AUTO: 11.6 K/UL (ref 1.8–7.7)
NEUTROPHILS # BLD AUTO: 11.9 K/UL (ref 1.8–7.7)
NEUTROPHILS # BLD AUTO: 12.7 K/UL (ref 1.8–7.7)
NEUTROPHILS # BLD AUTO: 15.8 K/UL (ref 1.8–7.7)
NEUTROPHILS # BLD AUTO: 15.9 K/UL (ref 1.8–7.7)
NEUTROPHILS NFR BLD: 84.3 % (ref 38–73)
NEUTROPHILS NFR BLD: 84.5 % (ref 38–73)
NEUTROPHILS NFR BLD: 87 % (ref 38–73)
NEUTROPHILS NFR BLD: 87.3 % (ref 38–73)
NEUTROPHILS NFR BLD: 91.6 % (ref 38–73)
NITRITE UR QL STRIP: NEGATIVE
NITRITE UR QL STRIP: NEGATIVE
NRBC BLD-RTO: 0 /100 WBC
OPIATES UR QL SCN: NEGATIVE
OSMOLALITY UR: 483 MOSM/KG (ref 50–1200)
PCO2 BLDA: 46 MMHG (ref 35–45)
PCO2 BLDA: 56.6 MMHG (ref 35–45)
PCP UR QL SCN>25 NG/ML: NEGATIVE
PH SMN: 7.41 [PH] (ref 7.35–7.45)
PH SMN: 7.5 [PH] (ref 7.35–7.45)
PH UR STRIP: 6 [PH] (ref 5–8)
PH UR STRIP: 6 [PH] (ref 5–8)
PHOSPHATE SERPL-MCNC: 2.6 MG/DL (ref 2.7–4.5)
PHOSPHATE SERPL-MCNC: 3.2 MG/DL (ref 2.7–4.5)
PLATELET # BLD AUTO: 372 K/UL (ref 150–450)
PLATELET # BLD AUTO: 373 K/UL (ref 150–450)
PLATELET # BLD AUTO: 421 K/UL (ref 150–450)
PLATELET # BLD AUTO: 424 K/UL (ref 150–450)
PLATELET # BLD AUTO: 425 K/UL (ref 150–450)
PLATELET # BLD AUTO: 425 K/UL (ref 150–450)
PLATELET # BLD AUTO: 435 K/UL (ref 150–450)
PLATELET # BLD AUTO: 471 K/UL (ref 150–450)
PLATELET # BLD AUTO: 540 K/UL (ref 150–450)
PMV BLD AUTO: 8.8 FL (ref 9.2–12.9)
PMV BLD AUTO: 8.8 FL (ref 9.2–12.9)
PMV BLD AUTO: 9 FL (ref 9.2–12.9)
PMV BLD AUTO: 9.1 FL (ref 9.2–12.9)
PMV BLD AUTO: 9.1 FL (ref 9.2–12.9)
PMV BLD AUTO: 9.2 FL (ref 9.2–12.9)
PMV BLD AUTO: 9.3 FL (ref 9.2–12.9)
PMV BLD AUTO: 9.3 FL (ref 9.2–12.9)
PMV BLD AUTO: 9.5 FL (ref 9.2–12.9)
PO2 BLDA: 107 MMHG (ref 80–100)
PO2 BLDA: 48 MMHG (ref 40–60)
POC BE: 11 MMOL/L
POC BE: 13 MMOL/L
POC IONIZED CALCIUM: 1.69 MMOL/L (ref 1.06–1.42)
POC SATURATED O2: 86 % (ref 95–100)
POC SATURATED O2: 98 % (ref 95–100)
POC TCO2: 37 MMOL/L (ref 24–29)
POC TCO2: 38 MMOL/L (ref 23–27)
POTASSIUM BLD-SCNC: 3.2 MMOL/L (ref 3.5–5.1)
POTASSIUM SERPL-SCNC: 2.7 MMOL/L (ref 3.5–5.1)
POTASSIUM SERPL-SCNC: 3 MMOL/L (ref 3.5–5.1)
POTASSIUM SERPL-SCNC: 3.1 MMOL/L (ref 3.5–5.1)
POTASSIUM SERPL-SCNC: 3.3 MMOL/L (ref 3.5–5.1)
POTASSIUM SERPL-SCNC: 3.4 MMOL/L (ref 3.5–5.1)
POTASSIUM SERPL-SCNC: 3.5 MMOL/L (ref 3.5–5.1)
POTASSIUM SERPL-SCNC: 3.9 MMOL/L (ref 3.5–5.1)
POTASSIUM SERPL-SCNC: 3.9 MMOL/L (ref 3.5–5.1)
POTASSIUM SERPL-SCNC: 4.2 MMOL/L (ref 3.5–5.1)
PROCALCITONIN SERPL IA-MCNC: 0.06 NG/ML (ref 0–0.5)
PROT SERPL-MCNC: 6.2 G/DL (ref 6–8.4)
PROT SERPL-MCNC: 6.2 G/DL (ref 6–8.5)
PROT SERPL-MCNC: 6.5 G/DL (ref 6–8.4)
PROT SERPL-MCNC: 6.8 G/DL (ref 6–8.4)
PROT SERPL-MCNC: 7 G/DL (ref 6–8.4)
PROT SERPL-MCNC: 7.3 G/DL (ref 6–8.4)
PROT UR QL STRIP: ABNORMAL
PROT UR QL STRIP: ABNORMAL
PROTHROMBIN TIME: 14.5 SEC (ref 11.4–13.7)
PROTHROMBIN TIME: 14.6 SEC (ref 11.4–13.7)
PTH RELATED PROT SERPL-SCNC: <2 PMOL/L
PTH-INTACT SERPL-MCNC: 3.7 PG/ML (ref 9–77)
PTH-INTACT SERPL-MCNC: 4.7 PG/ML (ref 9–77)
RBC # BLD AUTO: 4.62 M/UL (ref 4–5.4)
RBC # BLD AUTO: 4.74 M/UL (ref 4–5.4)
RBC # BLD AUTO: 4.77 M/UL (ref 4–5.4)
RBC # BLD AUTO: 4.78 M/UL (ref 4–5.4)
RBC # BLD AUTO: 4.93 M/UL (ref 4–5.4)
RBC # BLD AUTO: 5.05 M/UL (ref 4–5.4)
RBC # BLD AUTO: 5.16 M/UL (ref 4–5.4)
RBC # BLD AUTO: 5.38 M/UL (ref 4–5.4)
RBC # BLD AUTO: 5.38 M/UL (ref 4–5.4)
RBC #/AREA URNS HPF: 2 /HPF (ref 0–4)
RBC #/AREA URNS HPF: 9 /HPF (ref 0–4)
RH BLD: NORMAL
SAMPLE: ABNORMAL
SAMPLE: ABNORMAL
SARS-COV-2 RDRP RESP QL NAA+PROBE: NEGATIVE
SARS-COV-2 RDRP RESP QL NAA+PROBE: NEGATIVE
SITE: ABNORMAL
SODIUM BLD-SCNC: 133 MMOL/L (ref 136–145)
SODIUM SERPL-SCNC: 131 MMOL/L (ref 136–145)
SODIUM SERPL-SCNC: 132 MMOL/L (ref 136–145)
SODIUM SERPL-SCNC: 132 MMOL/L (ref 136–145)
SODIUM SERPL-SCNC: 134 MMOL/L (ref 136–145)
SODIUM SERPL-SCNC: 134 MMOL/L (ref 136–145)
SODIUM SERPL-SCNC: 135 MMOL/L (ref 136–145)
SODIUM SERPL-SCNC: 135 MMOL/L (ref 136–145)
SODIUM SERPL-SCNC: 137 MMOL/L (ref 136–145)
SODIUM SERPL-SCNC: 137 MMOL/L (ref 136–145)
SODIUM SERPL-SCNC: 138 MMOL/L (ref 136–145)
SODIUM SERPL-SCNC: 139 MMOL/L (ref 136–145)
SODIUM UR-SCNC: 60 MMOL/L (ref 20–250)
SP GR UR STRIP: 1.02 (ref 1–1.03)
SP GR UR STRIP: 1.02 (ref 1–1.03)
SPECIMEN SOURCE: NORMAL
SPECIMEN SOURCE: NORMAL
SQUAMOUS #/AREA URNS HPF: 2 /HPF
SQUAMOUS #/AREA URNS HPF: 30 /HPF
TOXICOLOGY INFORMATION: NORMAL
TROPONIN I SERPL DL<=0.01 NG/ML-MCNC: 0.03 NG/ML
TROPONIN I SERPL DL<=0.01 NG/ML-MCNC: 0.04 NG/ML
TSH SERPL DL<=0.005 MIU/L-ACNC: 1.25 UIU/ML (ref 0.34–5.6)
URN SPEC COLLECT METH UR: ABNORMAL
URN SPEC COLLECT METH UR: ABNORMAL
UROBILINOGEN UR STRIP-ACNC: 1 EU/DL
UROBILINOGEN UR STRIP-ACNC: NEGATIVE EU/DL
VANCOMYCIN SERPL-MCNC: 10.1 UG/ML
VANCOMYCIN TROUGH SERPL-MCNC: 35.1 UG/ML
WBC # BLD AUTO: 12.67 K/UL (ref 3.9–12.7)
WBC # BLD AUTO: 14.09 K/UL (ref 3.9–12.7)
WBC # BLD AUTO: 14.54 K/UL (ref 3.9–12.7)
WBC # BLD AUTO: 14.6 K/UL (ref 3.9–12.7)
WBC # BLD AUTO: 14.87 K/UL (ref 3.9–12.7)
WBC # BLD AUTO: 17.04 K/UL (ref 3.9–12.7)
WBC # BLD AUTO: 17.92 K/UL (ref 3.9–12.7)
WBC # BLD AUTO: 18.25 K/UL (ref 3.9–12.7)
WBC # BLD AUTO: 18.76 K/UL (ref 3.9–12.7)
WBC #/AREA URNS HPF: 2 /HPF (ref 0–5)
WBC #/AREA URNS HPF: >100 /HPF (ref 0–5)

## 2022-01-01 PROCEDURE — 80048 BASIC METABOLIC PNL TOTAL CA: CPT | Performed by: INTERNAL MEDICINE

## 2022-01-01 PROCEDURE — 63600175 PHARM REV CODE 636 W HCPCS: Performed by: INTERNAL MEDICINE

## 2022-01-01 PROCEDURE — 84100 ASSAY OF PHOSPHORUS: CPT | Performed by: EMERGENCY MEDICINE

## 2022-01-01 PROCEDURE — S0030 INJECTION, METRONIDAZOLE: HCPCS | Performed by: STUDENT IN AN ORGANIZED HEALTH CARE EDUCATION/TRAINING PROGRAM

## 2022-01-01 PROCEDURE — 85610 PROTHROMBIN TIME: CPT | Performed by: EMERGENCY MEDICINE

## 2022-01-01 PROCEDURE — 25000003 PHARM REV CODE 250: Performed by: INTERNAL MEDICINE

## 2022-01-01 PROCEDURE — 27200946 HC BRUSH, CYTOLOGY: Performed by: INTERNAL MEDICINE

## 2022-01-01 PROCEDURE — 99223 PR INITIAL HOSPITAL CARE,LEVL III: ICD-10-PCS | Mod: ,,, | Performed by: INTERNAL MEDICINE

## 2022-01-01 PROCEDURE — 31623 DX BRONCHOSCOPE/BRUSH: CPT | Performed by: INTERNAL MEDICINE

## 2022-01-01 PROCEDURE — 63600175 PHARM REV CODE 636 W HCPCS: Performed by: EMERGENCY MEDICINE

## 2022-01-01 PROCEDURE — 31623 DX BRONCHOSCOPE/BRUSH: CPT | Mod: 51,RT,, | Performed by: INTERNAL MEDICINE

## 2022-01-01 PROCEDURE — 99900031 HC PATIENT EDUCATION (STAT)

## 2022-01-01 PROCEDURE — 84484 ASSAY OF TROPONIN QUANT: CPT | Performed by: EMERGENCY MEDICINE

## 2022-01-01 PROCEDURE — 25000003 PHARM REV CODE 250: Performed by: EMERGENCY MEDICINE

## 2022-01-01 PROCEDURE — 93010 ELECTROCARDIOGRAM REPORT: CPT | Mod: ,,, | Performed by: SPECIALIST

## 2022-01-01 PROCEDURE — 80307 DRUG TEST PRSMV CHEM ANLYZR: CPT | Performed by: EMERGENCY MEDICINE

## 2022-01-01 PROCEDURE — 86850 RBC ANTIBODY SCREEN: CPT | Performed by: EMERGENCY MEDICINE

## 2022-01-01 PROCEDURE — 87040 BLOOD CULTURE FOR BACTERIA: CPT | Performed by: NURSE PRACTITIONER

## 2022-01-01 PROCEDURE — 25000003 PHARM REV CODE 250: Performed by: STUDENT IN AN ORGANIZED HEALTH CARE EDUCATION/TRAINING PROGRAM

## 2022-01-01 PROCEDURE — 82550 ASSAY OF CK (CPK): CPT | Performed by: EMERGENCY MEDICINE

## 2022-01-01 PROCEDURE — 63600175 PHARM REV CODE 636 W HCPCS: Mod: JG | Performed by: INTERNAL MEDICINE

## 2022-01-01 PROCEDURE — 36415 COLL VENOUS BLD VENIPUNCTURE: CPT | Performed by: NURSE PRACTITIONER

## 2022-01-01 PROCEDURE — 83605 ASSAY OF LACTIC ACID: CPT | Performed by: INTERNAL MEDICINE

## 2022-01-01 PROCEDURE — 85025 COMPLETE CBC W/AUTO DIFF WBC: CPT | Performed by: NURSE PRACTITIONER

## 2022-01-01 PROCEDURE — 36415 COLL VENOUS BLD VENIPUNCTURE: CPT | Performed by: INTERNAL MEDICINE

## 2022-01-01 PROCEDURE — 12000002 HC ACUTE/MED SURGE SEMI-PRIVATE ROOM

## 2022-01-01 PROCEDURE — 94799 UNLISTED PULMONARY SVC/PX: CPT

## 2022-01-01 PROCEDURE — 27200944 HC BRONCH FORCEPS DISPOSABLE: Performed by: INTERNAL MEDICINE

## 2022-01-01 PROCEDURE — 85610 PROTHROMBIN TIME: CPT | Performed by: NURSE PRACTITIONER

## 2022-01-01 PROCEDURE — 99233 PR SUBSEQUENT HOSPITAL CARE,LEVL III: ICD-10-PCS | Mod: ,,, | Performed by: INTERNAL MEDICINE

## 2022-01-01 PROCEDURE — 84145 PROCALCITONIN (PCT): CPT | Performed by: NURSE PRACTITIONER

## 2022-01-01 PROCEDURE — 88305 TISSUE EXAM BY PATHOLOGIST: CPT | Mod: TC,59

## 2022-01-01 PROCEDURE — 84165 PROTEIN E-PHORESIS SERUM: CPT | Performed by: HOSPITALIST

## 2022-01-01 PROCEDURE — 93010 EKG 12-LEAD: ICD-10-PCS | Mod: ,,, | Performed by: INTERNAL MEDICINE

## 2022-01-01 PROCEDURE — 99222 1ST HOSP IP/OBS MODERATE 55: CPT | Mod: ,,, | Performed by: INTERNAL MEDICINE

## 2022-01-01 PROCEDURE — 94640 AIRWAY INHALATION TREATMENT: CPT

## 2022-01-01 PROCEDURE — 87118 MYCOBACTERIC IDENTIFICATION: CPT | Performed by: INTERNAL MEDICINE

## 2022-01-01 PROCEDURE — 37000009 HC ANESTHESIA EA ADD 15 MINS: Performed by: INTERNAL MEDICINE

## 2022-01-01 PROCEDURE — 99900035 HC TECH TIME PER 15 MIN (STAT)

## 2022-01-01 PROCEDURE — 85025 COMPLETE CBC W/AUTO DIFF WBC: CPT | Performed by: EMERGENCY MEDICINE

## 2022-01-01 PROCEDURE — 31628 BRONCHOSCOPY/LUNG BX EACH: CPT | Mod: RT,,, | Performed by: INTERNAL MEDICINE

## 2022-01-01 PROCEDURE — 27000221 HC OXYGEN, UP TO 24 HOURS

## 2022-01-01 PROCEDURE — 25000242 PHARM REV CODE 250 ALT 637 W/ HCPCS: Performed by: INTERNAL MEDICINE

## 2022-01-01 PROCEDURE — 99232 SBSQ HOSP IP/OBS MODERATE 35: CPT | Mod: ,,, | Performed by: INTERNAL MEDICINE

## 2022-01-01 PROCEDURE — 99232 PR SUBSEQUENT HOSPITAL CARE,LEVL II: ICD-10-PCS | Mod: 25,,, | Performed by: INTERNAL MEDICINE

## 2022-01-01 PROCEDURE — 80053 COMPREHEN METABOLIC PANEL: CPT | Performed by: INTERNAL MEDICINE

## 2022-01-01 PROCEDURE — 27000675 HC TUBING MICRODRIP: Performed by: NURSE ANESTHETIST, CERTIFIED REGISTERED

## 2022-01-01 PROCEDURE — 93010 ELECTROCARDIOGRAM REPORT: CPT | Mod: ,,, | Performed by: INTERNAL MEDICINE

## 2022-01-01 PROCEDURE — 27201114 HC TRAP (ANY): Performed by: INTERNAL MEDICINE

## 2022-01-01 PROCEDURE — 63600175 PHARM REV CODE 636 W HCPCS: Performed by: STUDENT IN AN ORGANIZED HEALTH CARE EDUCATION/TRAINING PROGRAM

## 2022-01-01 PROCEDURE — 84443 ASSAY THYROID STIM HORMONE: CPT | Performed by: EMERGENCY MEDICINE

## 2022-01-01 PROCEDURE — 63600175 PHARM REV CODE 636 W HCPCS: Performed by: NURSE ANESTHETIST, CERTIFIED REGISTERED

## 2022-01-01 PROCEDURE — 25000003 PHARM REV CODE 250

## 2022-01-01 PROCEDURE — 82803 BLOOD GASES ANY COMBINATION: CPT

## 2022-01-01 PROCEDURE — 87186 SC STD MICRODIL/AGAR DIL: CPT | Performed by: NURSE PRACTITIONER

## 2022-01-01 PROCEDURE — 94761 N-INVAS EAR/PLS OXIMETRY MLT: CPT

## 2022-01-01 PROCEDURE — 36415 COLL VENOUS BLD VENIPUNCTURE: CPT | Performed by: EMERGENCY MEDICINE

## 2022-01-01 PROCEDURE — 25000242 PHARM REV CODE 250 ALT 637 W/ HCPCS: Performed by: EMERGENCY MEDICINE

## 2022-01-01 PROCEDURE — 27100171 HC OXYGEN HIGH FLOW UP TO 24 HOURS

## 2022-01-01 PROCEDURE — 80053 COMPREHEN METABOLIC PANEL: CPT | Mod: 91 | Performed by: INTERNAL MEDICINE

## 2022-01-01 PROCEDURE — 83970 ASSAY OF PARATHORMONE: CPT | Performed by: EMERGENCY MEDICINE

## 2022-01-01 PROCEDURE — 85025 COMPLETE CBC W/AUTO DIFF WBC: CPT | Performed by: INTERNAL MEDICINE

## 2022-01-01 PROCEDURE — 20000000 HC ICU ROOM

## 2022-01-01 PROCEDURE — 93005 ELECTROCARDIOGRAM TRACING: CPT | Performed by: INTERNAL MEDICINE

## 2022-01-01 PROCEDURE — 87210 SMEAR WET MOUNT SALINE/INK: CPT | Performed by: INTERNAL MEDICINE

## 2022-01-01 PROCEDURE — 87205 SMEAR GRAM STAIN: CPT | Performed by: INTERNAL MEDICINE

## 2022-01-01 PROCEDURE — 86901 BLOOD TYPING SEROLOGIC RH(D): CPT | Performed by: EMERGENCY MEDICINE

## 2022-01-01 PROCEDURE — 97163 PT EVAL HIGH COMPLEX 45 MIN: CPT

## 2022-01-01 PROCEDURE — 83735 ASSAY OF MAGNESIUM: CPT | Performed by: STUDENT IN AN ORGANIZED HEALTH CARE EDUCATION/TRAINING PROGRAM

## 2022-01-01 PROCEDURE — 99291 PR CRITICAL CARE, E/M 30-74 MINUTES: ICD-10-PCS | Mod: ,,, | Performed by: INTERNAL MEDICINE

## 2022-01-01 PROCEDURE — 99213 PR OFFICE/OUTPT VISIT, EST, LEVL III, 20-29 MIN: ICD-10-PCS | Mod: S$PBB,AQ,, | Performed by: INTERNAL MEDICINE

## 2022-01-01 PROCEDURE — 99285 EMERGENCY DEPT VISIT HI MDM: CPT | Mod: 25

## 2022-01-01 PROCEDURE — 87206 SMEAR FLUORESCENT/ACID STAI: CPT | Performed by: INTERNAL MEDICINE

## 2022-01-01 PROCEDURE — 85027 COMPLETE CBC AUTOMATED: CPT | Performed by: INTERNAL MEDICINE

## 2022-01-01 PROCEDURE — 80053 COMPREHEN METABOLIC PANEL: CPT | Performed by: EMERGENCY MEDICINE

## 2022-01-01 PROCEDURE — 99232 PR SUBSEQUENT HOSPITAL CARE,LEVL II: ICD-10-PCS | Mod: ,,, | Performed by: INTERNAL MEDICINE

## 2022-01-01 PROCEDURE — 87116 MYCOBACTERIA CULTURE: CPT | Performed by: INTERNAL MEDICINE

## 2022-01-01 PROCEDURE — 27000673 HC TUBING BLOOD Y: Performed by: NURSE ANESTHETIST, CERTIFIED REGISTERED

## 2022-01-01 PROCEDURE — 93010 EKG 12-LEAD: ICD-10-PCS | Mod: ,,, | Performed by: SPECIALIST

## 2022-01-01 PROCEDURE — 84300 ASSAY OF URINE SODIUM: CPT | Performed by: INTERNAL MEDICINE

## 2022-01-01 PROCEDURE — 94618 PULMONARY STRESS TESTING: CPT

## 2022-01-01 PROCEDURE — 31623 PR BRONCHOSCOPY,DIAGNOSTIC W BRUSH: ICD-10-PCS | Mod: 51,RT,, | Performed by: INTERNAL MEDICINE

## 2022-01-01 PROCEDURE — 31624 DX BRONCHOSCOPE/LAVAGE: CPT | Mod: 51,RT,, | Performed by: INTERNAL MEDICINE

## 2022-01-01 PROCEDURE — 87102 FUNGUS ISOLATION CULTURE: CPT | Performed by: INTERNAL MEDICINE

## 2022-01-01 PROCEDURE — 99213 OFFICE O/P EST LOW 20 MIN: CPT | Mod: S$PBB,AQ,, | Performed by: INTERNAL MEDICINE

## 2022-01-01 PROCEDURE — 25000003 PHARM REV CODE 250: Performed by: NURSE ANESTHETIST, CERTIFIED REGISTERED

## 2022-01-01 PROCEDURE — 80053 COMPREHEN METABOLIC PANEL: CPT | Performed by: NURSE PRACTITIONER

## 2022-01-01 PROCEDURE — 85379 FIBRIN DEGRADATION QUANT: CPT | Performed by: EMERGENCY MEDICINE

## 2022-01-01 PROCEDURE — 83935 ASSAY OF URINE OSMOLALITY: CPT | Performed by: INTERNAL MEDICINE

## 2022-01-01 PROCEDURE — 99291 CRITICAL CARE FIRST HOUR: CPT | Mod: ,,, | Performed by: INTERNAL MEDICINE

## 2022-01-01 PROCEDURE — 99232 SBSQ HOSP IP/OBS MODERATE 35: CPT | Mod: 25,,, | Performed by: INTERNAL MEDICINE

## 2022-01-01 PROCEDURE — 80202 ASSAY OF VANCOMYCIN: CPT | Performed by: EMERGENCY MEDICINE

## 2022-01-01 PROCEDURE — 99222 PR INITIAL HOSPITAL CARE,LEVL II: ICD-10-PCS | Mod: ,,, | Performed by: INTERNAL MEDICINE

## 2022-01-01 PROCEDURE — 83880 ASSAY OF NATRIURETIC PEPTIDE: CPT | Performed by: EMERGENCY MEDICINE

## 2022-01-01 PROCEDURE — 31624 DX BRONCHOSCOPE/LAVAGE: CPT | Performed by: INTERNAL MEDICINE

## 2022-01-01 PROCEDURE — 83690 ASSAY OF LIPASE: CPT | Performed by: EMERGENCY MEDICINE

## 2022-01-01 PROCEDURE — 87070 CULTURE OTHR SPECIMN AEROBIC: CPT | Performed by: INTERNAL MEDICINE

## 2022-01-01 PROCEDURE — 31624 PR BRONCHOSCOPY,DIAG2STIC W LAVAGE: ICD-10-PCS | Mod: 51,RT,, | Performed by: INTERNAL MEDICINE

## 2022-01-01 PROCEDURE — 87502 INFLUENZA DNA AMP PROBE: CPT | Performed by: EMERGENCY MEDICINE

## 2022-01-01 PROCEDURE — 85730 THROMBOPLASTIN TIME PARTIAL: CPT | Performed by: EMERGENCY MEDICINE

## 2022-01-01 PROCEDURE — 87086 URINE CULTURE/COLONY COUNT: CPT | Performed by: NURSE PRACTITIONER

## 2022-01-01 PROCEDURE — 36415 COLL VENOUS BLD VENIPUNCTURE: CPT | Performed by: HOSPITALIST

## 2022-01-01 PROCEDURE — 25500020 PHARM REV CODE 255: Performed by: EMERGENCY MEDICINE

## 2022-01-01 PROCEDURE — 83605 ASSAY OF LACTIC ACID: CPT | Performed by: NURSE PRACTITIONER

## 2022-01-01 PROCEDURE — 31628 PR BRONCHOSCOPY,TRANSBRONCH BIOPSY: ICD-10-PCS | Mod: RT,,, | Performed by: INTERNAL MEDICINE

## 2022-01-01 PROCEDURE — 96365 THER/PROPH/DIAG IV INF INIT: CPT

## 2022-01-01 PROCEDURE — 31628 BRONCHOSCOPY/LUNG BX EACH: CPT | Performed by: INTERNAL MEDICINE

## 2022-01-01 PROCEDURE — 97530 THERAPEUTIC ACTIVITIES: CPT

## 2022-01-01 PROCEDURE — U0002 COVID-19 LAB TEST NON-CDC: HCPCS | Performed by: EMERGENCY MEDICINE

## 2022-01-01 PROCEDURE — 84100 ASSAY OF PHOSPHORUS: CPT | Performed by: NURSE PRACTITIONER

## 2022-01-01 PROCEDURE — 93005 ELECTROCARDIOGRAM TRACING: CPT | Performed by: SPECIALIST

## 2022-01-01 PROCEDURE — 83690 ASSAY OF LIPASE: CPT | Performed by: NURSE PRACTITIONER

## 2022-01-01 PROCEDURE — 83735 ASSAY OF MAGNESIUM: CPT | Performed by: NURSE PRACTITIONER

## 2022-01-01 PROCEDURE — 87077 CULTURE AEROBIC IDENTIFY: CPT | Performed by: NURSE PRACTITIONER

## 2022-01-01 PROCEDURE — 37000008 HC ANESTHESIA 1ST 15 MINUTES: Performed by: INTERNAL MEDICINE

## 2022-01-01 PROCEDURE — 99223 1ST HOSP IP/OBS HIGH 75: CPT | Mod: ,,, | Performed by: INTERNAL MEDICINE

## 2022-01-01 PROCEDURE — 99214 OFFICE O/P EST MOD 30 MIN: CPT | Performed by: INTERNAL MEDICINE

## 2022-01-01 PROCEDURE — 83970 ASSAY OF PARATHORMONE: CPT | Performed by: INTERNAL MEDICINE

## 2022-01-01 PROCEDURE — 83605 ASSAY OF LACTIC ACID: CPT | Performed by: EMERGENCY MEDICINE

## 2022-01-01 PROCEDURE — 82397 CHEMILUMINESCENT ASSAY: CPT | Performed by: INTERNAL MEDICINE

## 2022-01-01 PROCEDURE — 81001 URINALYSIS AUTO W/SCOPE: CPT | Performed by: EMERGENCY MEDICINE

## 2022-01-01 PROCEDURE — 99233 SBSQ HOSP IP/OBS HIGH 50: CPT | Mod: ,,, | Performed by: INTERNAL MEDICINE

## 2022-01-01 PROCEDURE — 88341 IMHCHEM/IMCYTCHM EA ADD ANTB: CPT | Mod: TC

## 2022-01-01 PROCEDURE — 83735 ASSAY OF MAGNESIUM: CPT | Performed by: INTERNAL MEDICINE

## 2022-01-01 PROCEDURE — 94644 CONT INHLJ TX 1ST HOUR: CPT

## 2022-01-01 PROCEDURE — 85730 THROMBOPLASTIN TIME PARTIAL: CPT | Performed by: NURSE PRACTITIONER

## 2022-01-01 PROCEDURE — 96375 TX/PRO/DX INJ NEW DRUG ADDON: CPT

## 2022-01-01 PROCEDURE — 87040 BLOOD CULTURE FOR BACTERIA: CPT | Mod: 59 | Performed by: EMERGENCY MEDICINE

## 2022-01-01 PROCEDURE — 86900 BLOOD TYPING SEROLOGIC ABO: CPT | Performed by: EMERGENCY MEDICINE

## 2022-01-01 PROCEDURE — 25000003 PHARM REV CODE 250: Performed by: HOSPITALIST

## 2022-01-01 PROCEDURE — 81001 URINALYSIS AUTO W/SCOPE: CPT | Performed by: NURSE PRACTITIONER

## 2022-01-01 PROCEDURE — 51701 INSERT BLADDER CATHETER: CPT

## 2022-01-01 PROCEDURE — 83880 ASSAY OF NATRIURETIC PEPTIDE: CPT | Performed by: NURSE PRACTITIONER

## 2022-01-01 PROCEDURE — 80202 ASSAY OF VANCOMYCIN: CPT | Performed by: INTERNAL MEDICINE

## 2022-01-01 PROCEDURE — 84484 ASSAY OF TROPONIN QUANT: CPT | Performed by: NURSE PRACTITIONER

## 2022-01-01 PROCEDURE — 83735 ASSAY OF MAGNESIUM: CPT | Performed by: EMERGENCY MEDICINE

## 2022-01-01 RX ORDER — PREDNISONE 20 MG/1
40 TABLET ORAL DAILY
Status: DISCONTINUED | OUTPATIENT
Start: 2022-01-01 | End: 2022-01-01 | Stop reason: HOSPADM

## 2022-01-01 RX ORDER — METHYLPREDNISOLONE SOD SUCC 125 MG
60 VIAL (EA) INJECTION
Status: DISCONTINUED | OUTPATIENT
Start: 2022-01-01 | End: 2022-01-01

## 2022-01-01 RX ORDER — NYSTATIN 100000 [USP'U]/ML
500000 SUSPENSION ORAL 4 TIMES DAILY
Status: DISCONTINUED | OUTPATIENT
Start: 2022-01-01 | End: 2022-01-01 | Stop reason: HOSPADM

## 2022-01-01 RX ORDER — METHYLPREDNISOLONE SOD SUCC 125 MG
60 VIAL (EA) INJECTION EVERY 6 HOURS
Status: DISCONTINUED | OUTPATIENT
Start: 2022-01-01 | End: 2022-01-01

## 2022-01-01 RX ORDER — POTASSIUM CHLORIDE 20 MEQ/1
20 TABLET, EXTENDED RELEASE ORAL
Status: DISCONTINUED | OUTPATIENT
Start: 2022-01-01 | End: 2022-01-01 | Stop reason: HOSPADM

## 2022-01-01 RX ORDER — PREDNISONE 10 MG/1
TABLET ORAL
Qty: 30 TABLET | Refills: 0 | Status: SHIPPED | OUTPATIENT
Start: 2022-01-01 | End: 2022-01-01

## 2022-01-01 RX ORDER — BUDESONIDE 0.5 MG/2ML
0.5 INHALANT ORAL ONCE
Status: COMPLETED | OUTPATIENT
Start: 2022-01-01 | End: 2022-01-01

## 2022-01-01 RX ORDER — METHYLPREDNISOLONE 4 MG/1
TABLET ORAL
Status: ON HOLD | COMMUNITY
Start: 2021-06-15 | End: 2022-01-01 | Stop reason: HOSPADM

## 2022-01-01 RX ORDER — SUCCINYLCHOLINE CHLORIDE 20 MG/ML
INJECTION INTRAMUSCULAR; INTRAVENOUS
Status: DISCONTINUED | OUTPATIENT
Start: 2022-01-01 | End: 2022-01-01

## 2022-01-01 RX ORDER — SODIUM CHLORIDE, SODIUM LACTATE, POTASSIUM CHLORIDE, CALCIUM CHLORIDE 600; 310; 30; 20 MG/100ML; MG/100ML; MG/100ML; MG/100ML
INJECTION, SOLUTION INTRAVENOUS CONTINUOUS PRN
Status: DISCONTINUED | OUTPATIENT
Start: 2022-01-01 | End: 2022-01-01

## 2022-01-01 RX ORDER — ONDANSETRON 2 MG/ML
4 INJECTION INTRAMUSCULAR; INTRAVENOUS DAILY PRN
Status: DISCONTINUED | OUTPATIENT
Start: 2022-01-01 | End: 2022-01-01 | Stop reason: HOSPADM

## 2022-01-01 RX ORDER — SODIUM CHLORIDE, SODIUM LACTATE, POTASSIUM CHLORIDE, CALCIUM CHLORIDE 600; 310; 30; 20 MG/100ML; MG/100ML; MG/100ML; MG/100ML
INJECTION, SOLUTION INTRAVENOUS CONTINUOUS
Status: DISCONTINUED | OUTPATIENT
Start: 2022-01-01 | End: 2022-01-01

## 2022-01-01 RX ORDER — MAGNESIUM SULFATE HEPTAHYDRATE 40 MG/ML
2 INJECTION, SOLUTION INTRAVENOUS
Status: DISCONTINUED | OUTPATIENT
Start: 2022-01-01 | End: 2022-01-01 | Stop reason: HOSPADM

## 2022-01-01 RX ORDER — FENTANYL CITRATE 50 UG/ML
25 INJECTION, SOLUTION INTRAMUSCULAR; INTRAVENOUS EVERY 5 MIN PRN
Status: DISCONTINUED | OUTPATIENT
Start: 2022-01-01 | End: 2022-01-01 | Stop reason: HOSPADM

## 2022-01-01 RX ORDER — GUAIFENESIN 600 MG/1
600 TABLET, EXTENDED RELEASE ORAL 2 TIMES DAILY
Status: DISCONTINUED | OUTPATIENT
Start: 2022-01-01 | End: 2022-01-01

## 2022-01-01 RX ORDER — CALCITONIN SALMON 200 [USP'U]/ML
4 INJECTION, SOLUTION INTRAMUSCULAR; SUBCUTANEOUS 2 TIMES DAILY
Status: COMPLETED | OUTPATIENT
Start: 2022-01-01 | End: 2022-01-01

## 2022-01-01 RX ORDER — LANOLIN ALCOHOL/MO/W.PET/CERES
800 CREAM (GRAM) TOPICAL
Status: DISCONTINUED | OUTPATIENT
Start: 2022-01-01 | End: 2022-01-01 | Stop reason: HOSPADM

## 2022-01-01 RX ORDER — SODIUM CHLORIDE 0.9 % (FLUSH) 0.9 %
10 SYRINGE (ML) INJECTION
Status: DISCONTINUED | OUTPATIENT
Start: 2022-01-01 | End: 2022-01-01 | Stop reason: HOSPADM

## 2022-01-01 RX ORDER — LEVALBUTEROL INHALATION SOLUTION 1.25 MG/3ML
1.25 SOLUTION RESPIRATORY (INHALATION) ONCE
Status: COMPLETED | OUTPATIENT
Start: 2022-01-01 | End: 2022-01-01

## 2022-01-01 RX ORDER — ACETAMINOPHEN 325 MG/1
650 TABLET ORAL EVERY 8 HOURS PRN
Status: DISCONTINUED | OUTPATIENT
Start: 2022-01-01 | End: 2022-01-01 | Stop reason: HOSPADM

## 2022-01-01 RX ORDER — OMEPRAZOLE 40 MG/1
40 CAPSULE, DELAYED RELEASE ORAL DAILY
Qty: 30 CAPSULE | Refills: 5 | Status: SHIPPED | OUTPATIENT
Start: 2022-01-01

## 2022-01-01 RX ORDER — PRAVASTATIN SODIUM 40 MG/1
80 TABLET ORAL NIGHTLY
Status: DISCONTINUED | OUTPATIENT
Start: 2022-01-01 | End: 2022-01-01 | Stop reason: HOSPADM

## 2022-01-01 RX ORDER — CEFUROXIME AXETIL 500 MG/1
500 TABLET ORAL EVERY 12 HOURS
Qty: 14 TABLET | Refills: 0 | Status: SHIPPED | OUTPATIENT
Start: 2022-01-01 | End: 2022-01-01

## 2022-01-01 RX ORDER — MEPERIDINE HYDROCHLORIDE 50 MG/ML
12.5 INJECTION INTRAMUSCULAR; INTRAVENOUS; SUBCUTANEOUS EVERY 10 MIN PRN
Status: DISCONTINUED | OUTPATIENT
Start: 2022-01-01 | End: 2022-01-01 | Stop reason: HOSPADM

## 2022-01-01 RX ORDER — MUPIROCIN 20 MG/G
OINTMENT TOPICAL 2 TIMES DAILY
Status: DISCONTINUED | OUTPATIENT
Start: 2022-01-01 | End: 2022-01-01 | Stop reason: HOSPADM

## 2022-01-01 RX ORDER — PANTOPRAZOLE SODIUM 40 MG/1
40 TABLET, DELAYED RELEASE ORAL DAILY
Status: DISCONTINUED | OUTPATIENT
Start: 2022-01-01 | End: 2022-01-01

## 2022-01-01 RX ORDER — PROMETHAZINE HYDROCHLORIDE AND DEXTROMETHORPHAN HYDROBROMIDE 6.25; 15 MG/5ML; MG/5ML
5 SYRUP ORAL EVERY 8 HOURS PRN
Qty: 120 ML | Refills: 0 | Status: SHIPPED | OUTPATIENT
Start: 2022-01-01 | End: 2022-01-01 | Stop reason: SDUPTHER

## 2022-01-01 RX ORDER — MEROPENEM AND SODIUM CHLORIDE 1 G/50ML
1 INJECTION, SOLUTION INTRAVENOUS ONCE
Status: COMPLETED | OUTPATIENT
Start: 2022-01-01 | End: 2022-01-01

## 2022-01-01 RX ORDER — FENTANYL CITRATE 50 UG/ML
INJECTION, SOLUTION INTRAMUSCULAR; INTRAVENOUS
Status: DISCONTINUED | OUTPATIENT
Start: 2022-01-01 | End: 2022-01-01

## 2022-01-01 RX ORDER — LORAZEPAM 2 MG/ML
0.5 INJECTION INTRAMUSCULAR ONCE
Status: DISCONTINUED | OUTPATIENT
Start: 2022-01-01 | End: 2022-01-01

## 2022-01-01 RX ORDER — GUAIFENESIN 600 MG/1
1200 TABLET, EXTENDED RELEASE ORAL 2 TIMES DAILY
Status: DISCONTINUED | OUTPATIENT
Start: 2022-01-01 | End: 2022-01-01 | Stop reason: HOSPADM

## 2022-01-01 RX ORDER — POTASSIUM CHLORIDE 20 MEQ/1
40 TABLET, EXTENDED RELEASE ORAL
Status: DISCONTINUED | OUTPATIENT
Start: 2022-01-01 | End: 2022-01-01 | Stop reason: HOSPADM

## 2022-01-01 RX ORDER — MAGNESIUM SULFATE HEPTAHYDRATE 40 MG/ML
4 INJECTION, SOLUTION INTRAVENOUS
Status: DISCONTINUED | OUTPATIENT
Start: 2022-01-01 | End: 2022-01-01 | Stop reason: HOSPADM

## 2022-01-01 RX ORDER — PRAVASTATIN SODIUM 80 MG/1
80 TABLET ORAL NIGHTLY
Qty: 90 TABLET | Refills: 0 | Status: SHIPPED | OUTPATIENT
Start: 2022-01-01

## 2022-01-01 RX ORDER — METRONIDAZOLE 500 MG/100ML
500 INJECTION, SOLUTION INTRAVENOUS
Status: DISCONTINUED | OUTPATIENT
Start: 2022-01-01 | End: 2022-01-01

## 2022-01-01 RX ORDER — ONDANSETRON 2 MG/ML
4 INJECTION INTRAMUSCULAR; INTRAVENOUS EVERY 8 HOURS PRN
Status: DISCONTINUED | OUTPATIENT
Start: 2022-01-01 | End: 2022-01-01 | Stop reason: HOSPADM

## 2022-01-01 RX ORDER — ALBUTEROL SULFATE 90 UG/1
AEROSOL, METERED RESPIRATORY (INHALATION)
Qty: 8.5 G | Refills: 2 | Status: SHIPPED | OUTPATIENT
Start: 2022-01-01

## 2022-01-01 RX ORDER — DOXYCYCLINE 100 MG/1
100 CAPSULE ORAL EVERY 12 HOURS
Status: DISCONTINUED | OUTPATIENT
Start: 2022-01-01 | End: 2022-01-01 | Stop reason: HOSPADM

## 2022-01-01 RX ORDER — CEFUROXIME AXETIL 500 MG/1
500 TABLET ORAL 2 TIMES DAILY
Qty: 8 TABLET | Refills: 0 | Status: SHIPPED | OUTPATIENT
Start: 2022-01-01 | End: 2022-01-01

## 2022-01-01 RX ORDER — LIDOCAINE HYDROCHLORIDE 20 MG/ML
JELLY TOPICAL
Status: DISCONTINUED | OUTPATIENT
Start: 2022-01-01 | End: 2022-01-01

## 2022-01-01 RX ORDER — ALBUTEROL SULFATE 90 UG/1
AEROSOL, METERED RESPIRATORY (INHALATION)
Qty: 8.5 G | Refills: 2 | Status: SHIPPED | OUTPATIENT
Start: 2022-01-01 | End: 2022-01-01 | Stop reason: SDUPTHER

## 2022-01-01 RX ORDER — CEFUROXIME AXETIL 250 MG/1
250 TABLET ORAL EVERY 12 HOURS
Status: DISCONTINUED | OUTPATIENT
Start: 2022-01-01 | End: 2022-01-01 | Stop reason: HOSPADM

## 2022-01-01 RX ORDER — PREDNISONE 20 MG/1
20 TABLET ORAL DAILY
Qty: 8 TABLET | Refills: 0 | Status: SHIPPED | OUTPATIENT
Start: 2022-01-01 | End: 2022-01-01

## 2022-01-01 RX ORDER — LIDOCAINE HCL/PF 100 MG/5ML
SYRINGE (ML) INTRAVENOUS
Status: DISCONTINUED | OUTPATIENT
Start: 2022-01-01 | End: 2022-01-01

## 2022-01-01 RX ORDER — AMLODIPINE AND OLMESARTAN MEDOXOMIL 5; 20 MG/1; MG/1
1 TABLET ORAL DAILY
Qty: 90 TABLET | Refills: 1 | Status: SHIPPED | OUTPATIENT
Start: 2022-01-01 | End: 2022-01-01

## 2022-01-01 RX ORDER — IPRATROPIUM BROMIDE AND ALBUTEROL SULFATE 2.5; .5 MG/3ML; MG/3ML
3 SOLUTION RESPIRATORY (INHALATION) EVERY 6 HOURS PRN
Status: DISCONTINUED | OUTPATIENT
Start: 2022-01-01 | End: 2022-01-01

## 2022-01-01 RX ORDER — METHYLPREDNISOLONE SOD SUCC 125 MG
125 VIAL (EA) INJECTION
Status: COMPLETED | OUTPATIENT
Start: 2022-01-01 | End: 2022-01-01

## 2022-01-01 RX ORDER — VANCOMYCIN HCL IN 5 % DEXTROSE 1G/250ML
1000 PLASTIC BAG, INJECTION (ML) INTRAVENOUS
Status: COMPLETED | OUTPATIENT
Start: 2022-01-01 | End: 2022-01-01

## 2022-01-01 RX ORDER — TALC
6 POWDER (GRAM) TOPICAL NIGHTLY PRN
Status: DISCONTINUED | OUTPATIENT
Start: 2022-01-01 | End: 2022-01-01 | Stop reason: HOSPADM

## 2022-01-01 RX ORDER — GUAIFENESIN 600 MG/1
1200 TABLET, EXTENDED RELEASE ORAL 2 TIMES DAILY
Qty: 20 TABLET | Refills: 0 | Status: ON HOLD | OUTPATIENT
Start: 2022-01-01 | End: 2022-01-01 | Stop reason: HOSPADM

## 2022-01-01 RX ORDER — IPRATROPIUM BROMIDE 0.5 MG/2.5ML
0.5 SOLUTION RESPIRATORY (INHALATION) ONCE
Status: COMPLETED | OUTPATIENT
Start: 2022-01-01 | End: 2022-01-01

## 2022-01-01 RX ORDER — PREDNISONE 20 MG/1
TABLET ORAL
Qty: 15 TABLET | Refills: 0 | Status: SHIPPED | OUTPATIENT
Start: 2022-01-01 | End: 2022-01-01

## 2022-01-01 RX ORDER — BENZONATATE 200 MG/1
200 CAPSULE ORAL 3 TIMES DAILY
Qty: 30 CAPSULE | Refills: 0 | Status: SHIPPED | OUTPATIENT
Start: 2022-01-01 | End: 2022-01-01

## 2022-01-01 RX ORDER — OXYBUTYNIN CHLORIDE 5 MG/1
5 TABLET ORAL 2 TIMES DAILY
Status: DISCONTINUED | OUTPATIENT
Start: 2022-01-01 | End: 2022-01-01 | Stop reason: HOSPADM

## 2022-01-01 RX ORDER — LEVALBUTEROL INHALATION SOLUTION 0.63 MG/3ML
0.63 SOLUTION RESPIRATORY (INHALATION)
Status: COMPLETED | OUTPATIENT
Start: 2022-01-01 | End: 2022-01-01

## 2022-01-01 RX ORDER — OXYCODONE HYDROCHLORIDE 5 MG/1
5 TABLET ORAL
Status: DISCONTINUED | OUTPATIENT
Start: 2022-01-01 | End: 2022-01-01 | Stop reason: HOSPADM

## 2022-01-01 RX ORDER — PREDNISONE 20 MG/1
TABLET ORAL
Status: ON HOLD | COMMUNITY
Start: 2022-01-01 | End: 2022-01-01 | Stop reason: HOSPADM

## 2022-01-01 RX ORDER — MEROPENEM AND SODIUM CHLORIDE 1 G/50ML
1 INJECTION, SOLUTION INTRAVENOUS
Status: DISCONTINUED | OUTPATIENT
Start: 2022-01-01 | End: 2022-01-01

## 2022-01-01 RX ORDER — IPRATROPIUM BROMIDE AND ALBUTEROL SULFATE 2.5; .5 MG/3ML; MG/3ML
3 SOLUTION RESPIRATORY (INHALATION)
Status: DISCONTINUED | OUTPATIENT
Start: 2022-01-01 | End: 2022-01-01 | Stop reason: HOSPADM

## 2022-01-01 RX ORDER — PANTOPRAZOLE SODIUM 40 MG/1
40 TABLET, DELAYED RELEASE ORAL
Status: DISCONTINUED | OUTPATIENT
Start: 2022-01-01 | End: 2022-01-01 | Stop reason: HOSPADM

## 2022-01-01 RX ORDER — BENZONATATE 100 MG/1
200 CAPSULE ORAL 3 TIMES DAILY
Status: DISCONTINUED | OUTPATIENT
Start: 2022-01-01 | End: 2022-01-01 | Stop reason: HOSPADM

## 2022-01-01 RX ORDER — CHLORHEXIDINE GLUCONATE ORAL RINSE 1.2 MG/ML
15 SOLUTION DENTAL 2 TIMES DAILY
Status: DISCONTINUED | OUTPATIENT
Start: 2022-01-01 | End: 2022-01-01 | Stop reason: HOSPADM

## 2022-01-01 RX ORDER — DIPHENHYDRAMINE HYDROCHLORIDE 50 MG/ML
12.5 INJECTION INTRAMUSCULAR; INTRAVENOUS
Status: DISCONTINUED | OUTPATIENT
Start: 2022-01-01 | End: 2022-01-01 | Stop reason: HOSPADM

## 2022-01-01 RX ORDER — AMLODIPINE AND OLMESARTAN MEDOXOMIL 5; 20 MG/1; MG/1
1 TABLET ORAL DAILY
Qty: 90 TABLET | Refills: 1 | Status: SHIPPED | OUTPATIENT
Start: 2022-01-01 | End: 2022-01-01 | Stop reason: SDUPTHER

## 2022-01-01 RX ORDER — SODIUM CHLORIDE 9 MG/ML
INJECTION, SOLUTION INTRAVENOUS CONTINUOUS
Status: DISCONTINUED | OUTPATIENT
Start: 2022-01-01 | End: 2022-01-01

## 2022-01-01 RX ORDER — MAGNESIUM SULFATE 1 G/100ML
1 INJECTION INTRAVENOUS
Status: DISCONTINUED | OUTPATIENT
Start: 2022-01-01 | End: 2022-01-01 | Stop reason: HOSPADM

## 2022-01-01 RX ORDER — NAPROXEN SODIUM 220 MG/1
81 TABLET, FILM COATED ORAL DAILY
Status: DISCONTINUED | OUTPATIENT
Start: 2022-01-01 | End: 2022-01-01 | Stop reason: HOSPADM

## 2022-01-01 RX ORDER — INSULIN ASPART 100 [IU]/ML
1-6 INJECTION, SOLUTION INTRAVENOUS; SUBCUTANEOUS
Status: DISCONTINUED | OUTPATIENT
Start: 2022-01-01 | End: 2022-01-01 | Stop reason: HOSPADM

## 2022-01-01 RX ORDER — LIDOCAINE HYDROCHLORIDE 40 MG/ML
SOLUTION TOPICAL
Status: DISCONTINUED | OUTPATIENT
Start: 2022-01-01 | End: 2022-01-01 | Stop reason: HOSPADM

## 2022-01-01 RX ORDER — ENOXAPARIN SODIUM 100 MG/ML
40 INJECTION SUBCUTANEOUS EVERY 24 HOURS
Status: DISCONTINUED | OUTPATIENT
Start: 2022-01-01 | End: 2022-01-01 | Stop reason: HOSPADM

## 2022-01-01 RX ORDER — ALBUTEROL SULFATE 0.83 MG/ML
SOLUTION RESPIRATORY (INHALATION)
Status: DISCONTINUED | OUTPATIENT
Start: 2022-01-01 | End: 2022-01-01 | Stop reason: HOSPADM

## 2022-01-01 RX ORDER — METHYLPREDNISOLONE SOD SUCC 125 MG
125 VIAL (EA) INJECTION ONCE
Status: COMPLETED | OUTPATIENT
Start: 2022-01-01 | End: 2022-01-01

## 2022-01-01 RX ORDER — IPRATROPIUM BROMIDE AND ALBUTEROL SULFATE 2.5; .5 MG/3ML; MG/3ML
3 SOLUTION RESPIRATORY (INHALATION) EVERY 6 HOURS
Status: DISCONTINUED | OUTPATIENT
Start: 2022-01-01 | End: 2022-01-01

## 2022-01-01 RX ORDER — ONDANSETRON 2 MG/ML
INJECTION INTRAMUSCULAR; INTRAVENOUS
Status: DISCONTINUED | OUTPATIENT
Start: 2022-01-01 | End: 2022-01-01

## 2022-01-01 RX ORDER — PROMETHAZINE HYDROCHLORIDE AND DEXTROMETHORPHAN HYDROBROMIDE 6.25; 15 MG/5ML; MG/5ML
5 SYRUP ORAL EVERY 8 HOURS PRN
Qty: 120 ML | Refills: 0 | Status: ON HOLD | OUTPATIENT
Start: 2022-01-01 | End: 2022-01-01 | Stop reason: HOSPADM

## 2022-01-01 RX ORDER — CODEINE PHOSPHATE AND GUAIFENESIN 10; 100 MG/5ML; MG/5ML
10 SOLUTION ORAL EVERY 6 HOURS PRN
Status: DISCONTINUED | OUTPATIENT
Start: 2022-01-01 | End: 2022-01-01 | Stop reason: HOSPADM

## 2022-01-01 RX ORDER — ASPIRIN 81 MG/1
81 TABLET ORAL DAILY
Status: DISCONTINUED | OUTPATIENT
Start: 2022-01-01 | End: 2022-01-01 | Stop reason: HOSPADM

## 2022-01-01 RX ORDER — GUAIFENESIN 600 MG/1
1200 TABLET, EXTENDED RELEASE ORAL 2 TIMES DAILY
Status: DISCONTINUED | OUTPATIENT
Start: 2022-01-01 | End: 2022-01-01

## 2022-01-01 RX ORDER — FAMOTIDINE 20 MG/1
20 TABLET, FILM COATED ORAL DAILY
Status: DISCONTINUED | OUTPATIENT
Start: 2022-01-01 | End: 2022-01-01 | Stop reason: HOSPADM

## 2022-01-01 RX ORDER — PROPOFOL 10 MG/ML
VIAL (ML) INTRAVENOUS CONTINUOUS PRN
Status: DISCONTINUED | OUTPATIENT
Start: 2022-01-01 | End: 2022-01-01

## 2022-01-01 RX ORDER — POTASSIUM CHLORIDE 20 MEQ/1
20 TABLET, EXTENDED RELEASE ORAL 2 TIMES DAILY
Status: DISCONTINUED | OUTPATIENT
Start: 2022-01-01 | End: 2022-01-01

## 2022-01-01 RX ORDER — BENZONATATE 100 MG/1
200 CAPSULE ORAL 3 TIMES DAILY PRN
Status: DISCONTINUED | OUTPATIENT
Start: 2022-01-01 | End: 2022-01-01 | Stop reason: HOSPADM

## 2022-01-01 RX ORDER — SERTRALINE HYDROCHLORIDE 50 MG/1
50 TABLET, FILM COATED ORAL NIGHTLY
Status: DISCONTINUED | OUTPATIENT
Start: 2022-01-01 | End: 2022-01-01

## 2022-01-01 RX ORDER — SODIUM CHLORIDE 0.9 % (FLUSH) 0.9 %
2 SYRINGE (ML) INJECTION EVERY 6 HOURS PRN
Status: DISCONTINUED | OUTPATIENT
Start: 2022-01-01 | End: 2022-01-01 | Stop reason: HOSPADM

## 2022-01-01 RX ORDER — PROPOFOL 10 MG/ML
VIAL (ML) INTRAVENOUS
Status: DISCONTINUED | OUTPATIENT
Start: 2022-01-01 | End: 2022-01-01

## 2022-01-01 RX ORDER — IPRATROPIUM BROMIDE AND ALBUTEROL SULFATE 2.5; .5 MG/3ML; MG/3ML
3 SOLUTION RESPIRATORY (INHALATION)
Status: DISCONTINUED | OUTPATIENT
Start: 2022-01-01 | End: 2022-01-01

## 2022-01-01 RX ORDER — SERTRALINE HYDROCHLORIDE 50 MG/1
50 TABLET, FILM COATED ORAL NIGHTLY
Status: DISCONTINUED | OUTPATIENT
Start: 2022-01-01 | End: 2022-01-01 | Stop reason: HOSPADM

## 2022-01-01 RX ORDER — VANCOMYCIN HCL IN 5 % DEXTROSE 1G/250ML
1000 PLASTIC BAG, INJECTION (ML) INTRAVENOUS ONCE
Status: COMPLETED | OUTPATIENT
Start: 2022-01-01 | End: 2022-01-01

## 2022-01-01 RX ORDER — OXYBUTYNIN CHLORIDE 5 MG/1
TABLET ORAL
Qty: 60 TABLET | Refills: 5 | Status: SHIPPED | OUTPATIENT
Start: 2022-01-01

## 2022-01-01 RX ADMIN — CEFTRIAXONE 2 G: 2 INJECTION, SOLUTION INTRAVENOUS at 09:05

## 2022-01-01 RX ADMIN — CHLORHEXIDINE GLUCONATE 15 ML: 1.2 RINSE ORAL at 08:05

## 2022-01-01 RX ADMIN — ENOXAPARIN SODIUM 40 MG: 40 INJECTION SUBCUTANEOUS at 04:05

## 2022-01-01 RX ADMIN — GUAIFENESIN AND DEXTROMETHORPHAN HYDROBROMIDE 2 TABLET: 600; 30 TABLET, EXTENDED RELEASE ORAL at 08:06

## 2022-01-01 RX ADMIN — PANTOPRAZOLE SODIUM 40 MG: 40 TABLET, DELAYED RELEASE ORAL at 05:05

## 2022-01-01 RX ADMIN — POTASSIUM CHLORIDE 20 MEQ: 1500 TABLET, EXTENDED RELEASE ORAL at 09:06

## 2022-01-01 RX ADMIN — OXYBUTYNIN CHLORIDE 5 MG: 5 TABLET ORAL at 09:05

## 2022-01-01 RX ADMIN — CHLORHEXIDINE GLUCONATE 15 ML: 1.2 RINSE ORAL at 09:05

## 2022-01-01 RX ADMIN — MEROPENEM AND SODIUM CHLORIDE 1 G: 1 INJECTION, SOLUTION INTRAVENOUS at 05:06

## 2022-01-01 RX ADMIN — PAMIDRONATE DISODIUM 30 MG: 3 INJECTION INTRAVENOUS at 09:05

## 2022-01-01 RX ADMIN — OXYBUTYNIN CHLORIDE 5 MG: 5 TABLET ORAL at 08:05

## 2022-01-01 RX ADMIN — LEVALBUTEROL HYDROCHLORIDE 1.25 MG: 1.25 SOLUTION RESPIRATORY (INHALATION) at 06:05

## 2022-01-01 RX ADMIN — PANTOPRAZOLE SODIUM 40 MG: 40 TABLET, DELAYED RELEASE ORAL at 02:06

## 2022-01-01 RX ADMIN — PRAVASTATIN SODIUM 80 MG: 40 TABLET ORAL at 08:05

## 2022-01-01 RX ADMIN — VANCOMYCIN HYDROCHLORIDE 1250 MG: 1.25 INJECTION, POWDER, LYOPHILIZED, FOR SOLUTION INTRAVENOUS at 09:05

## 2022-01-01 RX ADMIN — LIDOCAINE HYDROCHLORIDE 3 ML: 20 JELLY TOPICAL at 09:06

## 2022-01-01 RX ADMIN — IPRATROPIUM BROMIDE AND ALBUTEROL SULFATE 3 ML: .5; 3 SOLUTION RESPIRATORY (INHALATION) at 07:06

## 2022-01-01 RX ADMIN — METHYLPREDNISOLONE SODIUM SUCCINATE 60 MG: 125 INJECTION, POWDER, FOR SOLUTION INTRAMUSCULAR; INTRAVENOUS at 12:05

## 2022-01-01 RX ADMIN — POTASSIUM BICARBONATE 50 MEQ: 977.5 TABLET, EFFERVESCENT ORAL at 02:06

## 2022-01-01 RX ADMIN — SERTRALINE HYDROCHLORIDE 50 MG: 50 TABLET ORAL at 08:05

## 2022-01-01 RX ADMIN — SODIUM CHLORIDE, SODIUM LACTATE, POTASSIUM CHLORIDE, AND CALCIUM CHLORIDE 2040 ML: .6; .31; .03; .02 INJECTION, SOLUTION INTRAVENOUS at 06:05

## 2022-01-01 RX ADMIN — SERTRALINE HYDROCHLORIDE 50 MG: 50 TABLET ORAL at 09:05

## 2022-01-01 RX ADMIN — SERTRALINE HYDROCHLORIDE 50 MG: 50 TABLET ORAL at 09:06

## 2022-01-01 RX ADMIN — IPRATROPIUM BROMIDE AND ALBUTEROL SULFATE 3 ML: .5; 3 SOLUTION RESPIRATORY (INHALATION) at 08:06

## 2022-01-01 RX ADMIN — THERA TABS 1 TABLET: TAB at 08:06

## 2022-01-01 RX ADMIN — SODIUM CHLORIDE: 0.9 INJECTION, SOLUTION INTRAVENOUS at 01:06

## 2022-01-01 RX ADMIN — ASPIRIN 81 MG: 81 TABLET, DELAYED RELEASE ORAL at 09:05

## 2022-01-01 RX ADMIN — MUPIROCIN: 20 OINTMENT TOPICAL at 09:05

## 2022-01-01 RX ADMIN — POTASSIUM CHLORIDE 40 MEQ: 1500 TABLET, EXTENDED RELEASE ORAL at 06:05

## 2022-01-01 RX ADMIN — POTASSIUM CHLORIDE 20 MEQ: 1500 TABLET, EXTENDED RELEASE ORAL at 08:06

## 2022-01-01 RX ADMIN — IPRATROPIUM BROMIDE AND ALBUTEROL SULFATE 3 ML: .5; 3 SOLUTION RESPIRATORY (INHALATION) at 01:06

## 2022-01-01 RX ADMIN — PRAVASTATIN SODIUM 80 MG: 40 TABLET ORAL at 08:06

## 2022-01-01 RX ADMIN — FENTANYL CITRATE 50 MCG: 50 INJECTION INTRAMUSCULAR; INTRAVENOUS at 09:06

## 2022-01-01 RX ADMIN — PAMIDRONATE DISODIUM 90 MG: 3 INJECTION INTRAVENOUS at 09:06

## 2022-01-01 RX ADMIN — GUAIFENESIN AND DEXTROMETHORPHAN HYDROBROMIDE 2 TABLET: 600; 30 TABLET, EXTENDED RELEASE ORAL at 01:06

## 2022-01-01 RX ADMIN — PREDNISONE 40 MG: 20 TABLET ORAL at 09:05

## 2022-01-01 RX ADMIN — THERA TABS 1 TABLET: TAB at 09:05

## 2022-01-01 RX ADMIN — METRONIDAZOLE 500 MG: 500 INJECTION, SOLUTION INTRAVENOUS at 10:05

## 2022-01-01 RX ADMIN — PRAVASTATIN SODIUM 80 MG: 40 TABLET ORAL at 09:06

## 2022-01-01 RX ADMIN — PANTOPRAZOLE SODIUM 40 MG: 40 TABLET, DELAYED RELEASE ORAL at 08:06

## 2022-01-01 RX ADMIN — CALCITONIN SALMON 314 UNITS: 200 INJECTION, SOLUTION INTRAMUSCULAR; SUBCUTANEOUS at 01:05

## 2022-01-01 RX ADMIN — MELATONIN 6 MG: at 08:05

## 2022-01-01 RX ADMIN — PANTOPRAZOLE SODIUM 40 MG: 40 TABLET, DELAYED RELEASE ORAL at 09:06

## 2022-01-01 RX ADMIN — GUAIFENESIN 600 MG: 600 TABLET, EXTENDED RELEASE ORAL at 02:06

## 2022-01-01 RX ADMIN — POTASSIUM BICARBONATE 50 MEQ: 977.5 TABLET, EFFERVESCENT ORAL at 07:05

## 2022-01-01 RX ADMIN — GUAIFENESIN 1200 MG: 600 TABLET, EXTENDED RELEASE ORAL at 08:05

## 2022-01-01 RX ADMIN — GUAIFENESIN AND DEXTROMETHORPHAN HYDROBROMIDE 2 TABLET: 600; 30 TABLET, EXTENDED RELEASE ORAL at 09:06

## 2022-01-01 RX ADMIN — POTASSIUM CHLORIDE 40 MEQ: 1500 TABLET, EXTENDED RELEASE ORAL at 05:05

## 2022-01-01 RX ADMIN — POTASSIUM CHLORIDE 100 ML/HR: 149 INJECTION, SOLUTION, CONCENTRATE INTRAVENOUS at 12:05

## 2022-01-01 RX ADMIN — PREDNISONE 40 MG: 20 TABLET ORAL at 08:05

## 2022-01-01 RX ADMIN — DOXYCYCLINE HYCLATE 100 MG: 100 CAPSULE ORAL at 08:05

## 2022-01-01 RX ADMIN — PROPOFOL 69.06 MCG/KG/MIN: 10 INJECTION, EMULSION INTRAVENOUS at 09:06

## 2022-01-01 RX ADMIN — IOHEXOL 100 ML: 350 INJECTION, SOLUTION INTRAVENOUS at 12:06

## 2022-01-01 RX ADMIN — NYSTATIN 500000 UNITS: 100000 SUSPENSION ORAL at 12:05

## 2022-01-01 RX ADMIN — CALCITONIN SALMON 314 UNITS: 200 INJECTION, SOLUTION INTRAMUSCULAR; SUBCUTANEOUS at 09:05

## 2022-01-01 RX ADMIN — NYSTATIN 500000 UNITS: 100000 SUSPENSION ORAL at 01:05

## 2022-01-01 RX ADMIN — NYSTATIN 500000 UNITS: 100000 SUSPENSION ORAL at 08:05

## 2022-01-01 RX ADMIN — MUPIROCIN: 20 OINTMENT TOPICAL at 08:05

## 2022-01-01 RX ADMIN — CEFUROXIME AXETIL 250 MG: 250 TABLET, FILM COATED ORAL at 08:06

## 2022-01-01 RX ADMIN — ASPIRIN 81 MG 81 MG: 81 TABLET ORAL at 09:06

## 2022-01-01 RX ADMIN — VANCOMYCIN HYDROCHLORIDE 1250 MG: 1.25 INJECTION, POWDER, LYOPHILIZED, FOR SOLUTION INTRAVENOUS at 06:06

## 2022-01-01 RX ADMIN — SODIUM CHLORIDE, SODIUM LACTATE, POTASSIUM CHLORIDE, AND CALCIUM CHLORIDE: .6; .31; .03; .02 INJECTION, SOLUTION INTRAVENOUS at 01:06

## 2022-01-01 RX ADMIN — CEFTRIAXONE 1 G: 1 INJECTION, POWDER, FOR SOLUTION INTRAMUSCULAR; INTRAVENOUS at 09:05

## 2022-01-01 RX ADMIN — ASPIRIN 81 MG: 81 TABLET, DELAYED RELEASE ORAL at 12:05

## 2022-01-01 RX ADMIN — METHYLPREDNISOLONE SODIUM SUCCINATE 40 MG: 40 INJECTION, POWDER, FOR SOLUTION INTRAMUSCULAR; INTRAVENOUS at 01:06

## 2022-01-01 RX ADMIN — ASPIRIN 81 MG 81 MG: 81 TABLET ORAL at 08:06

## 2022-01-01 RX ADMIN — MEROPENEM AND SODIUM CHLORIDE 1 G: 1 INJECTION, SOLUTION INTRAVENOUS at 01:06

## 2022-01-01 RX ADMIN — IPRATROPIUM BROMIDE 0.5 MG: 0.5 SOLUTION RESPIRATORY (INHALATION) at 06:05

## 2022-01-01 RX ADMIN — THERA TABS 1 TABLET: TAB at 09:06

## 2022-01-01 RX ADMIN — LIDOCAINE HYDROCHLORIDE 50 MG: 20 INJECTION, SOLUTION INTRAVENOUS at 09:06

## 2022-01-01 RX ADMIN — MELATONIN 6 MG: at 09:05

## 2022-01-01 RX ADMIN — BUDESONIDE 0.5 MG: 0.5 INHALANT RESPIRATORY (INHALATION) at 06:05

## 2022-01-01 RX ADMIN — GUAIFENESIN 600 MG: 600 TABLET, EXTENDED RELEASE ORAL at 09:05

## 2022-01-01 RX ADMIN — Medication 120 MG: at 09:06

## 2022-01-01 RX ADMIN — IPRATROPIUM BROMIDE AND ALBUTEROL SULFATE 3 ML: .5; 3 SOLUTION RESPIRATORY (INHALATION) at 02:05

## 2022-01-01 RX ADMIN — MEROPENEM AND SODIUM CHLORIDE 1 G: 1 INJECTION, SOLUTION INTRAVENOUS at 12:06

## 2022-01-01 RX ADMIN — VANCOMYCIN HYDROCHLORIDE 1000 MG: 1 INJECTION, POWDER, LYOPHILIZED, FOR SOLUTION INTRAVENOUS at 07:05

## 2022-01-01 RX ADMIN — IPRATROPIUM BROMIDE AND ALBUTEROL SULFATE 3 ML: .5; 3 SOLUTION RESPIRATORY (INHALATION) at 07:05

## 2022-01-01 RX ADMIN — IPRATROPIUM BROMIDE AND ALBUTEROL SULFATE 3 ML: .5; 3 SOLUTION RESPIRATORY (INHALATION) at 02:06

## 2022-01-01 RX ADMIN — POTASSIUM CHLORIDE 20 MEQ: 1500 TABLET, EXTENDED RELEASE ORAL at 02:06

## 2022-01-01 RX ADMIN — PREDNISONE 40 MG: 20 TABLET ORAL at 08:06

## 2022-01-01 RX ADMIN — POTASSIUM CHLORIDE 40 MEQ: 1500 TABLET, EXTENDED RELEASE ORAL at 01:05

## 2022-01-01 RX ADMIN — IPRATROPIUM BROMIDE 0.5 MG: 0.5 SOLUTION RESPIRATORY (INHALATION) at 11:06

## 2022-01-01 RX ADMIN — GUAIFENESIN 600 MG: 600 TABLET, EXTENDED RELEASE ORAL at 08:06

## 2022-01-01 RX ADMIN — PROPOFOL 80 MG: 10 INJECTION, EMULSION INTRAVENOUS at 09:06

## 2022-01-01 RX ADMIN — IPRATROPIUM BROMIDE AND ALBUTEROL SULFATE 3 ML: .5; 3 SOLUTION RESPIRATORY (INHALATION) at 08:05

## 2022-01-01 RX ADMIN — METHYLPREDNISOLONE SODIUM SUCCINATE 125 MG: 125 INJECTION, POWDER, FOR SOLUTION INTRAMUSCULAR; INTRAVENOUS at 02:06

## 2022-01-01 RX ADMIN — MEROPENEM AND SODIUM CHLORIDE 1 G: 1 INJECTION, SOLUTION INTRAVENOUS at 09:06

## 2022-01-01 RX ADMIN — LIDOCAINE HYDROCHLORIDE 5 ML: 40 SOLUTION TOPICAL at 08:06

## 2022-01-01 RX ADMIN — MAGNESIUM SULFATE IN WATER 2 G: 40 INJECTION, SOLUTION INTRAVENOUS at 04:05

## 2022-01-01 RX ADMIN — GUAIFENESIN 1200 MG: 600 TABLET, EXTENDED RELEASE ORAL at 09:05

## 2022-01-01 RX ADMIN — METHYLPREDNISOLONE SODIUM SUCCINATE 40 MG: 40 INJECTION, POWDER, FOR SOLUTION INTRAMUSCULAR; INTRAVENOUS at 09:06

## 2022-01-01 RX ADMIN — MEROPENEM AND SODIUM CHLORIDE 1 G: 1 INJECTION, SOLUTION INTRAVENOUS at 08:06

## 2022-01-01 RX ADMIN — VANCOMYCIN HYDROCHLORIDE 1250 MG: 1.25 INJECTION, POWDER, LYOPHILIZED, FOR SOLUTION INTRAVENOUS at 04:06

## 2022-01-01 RX ADMIN — THERA TABS 1 TABLET: TAB at 08:05

## 2022-01-01 RX ADMIN — MEROPENEM AND SODIUM CHLORIDE 1 G: 1 INJECTION, SOLUTION INTRAVENOUS at 07:05

## 2022-01-01 RX ADMIN — DOXYCYCLINE HYCLATE 100 MG: 100 CAPSULE ORAL at 09:05

## 2022-01-01 RX ADMIN — ONDANSETRON 4 MG: 2 INJECTION INTRAMUSCULAR; INTRAVENOUS at 09:06

## 2022-01-01 RX ADMIN — SODIUM CHLORIDE, SODIUM LACTATE, POTASSIUM CHLORIDE, AND CALCIUM CHLORIDE: .6; .31; .03; .02 INJECTION, SOLUTION INTRAVENOUS at 09:06

## 2022-01-01 RX ADMIN — MELATONIN 6 MG: at 08:06

## 2022-01-01 RX ADMIN — METHYLPREDNISOLONE SODIUM SUCCINATE 125 MG: 125 INJECTION, POWDER, FOR SOLUTION INTRAMUSCULAR; INTRAVENOUS at 06:05

## 2022-01-01 RX ADMIN — INSULIN ASPART 1 UNITS: 100 INJECTION, SOLUTION INTRAVENOUS; SUBCUTANEOUS at 05:05

## 2022-01-01 RX ADMIN — OXYBUTYNIN CHLORIDE 5 MG: 5 TABLET ORAL at 12:05

## 2022-01-01 RX ADMIN — THERA TABS 1 TABLET: TAB at 12:05

## 2022-01-01 RX ADMIN — METHYLPREDNISOLONE SODIUM SUCCINATE 40 MG: 40 INJECTION, POWDER, FOR SOLUTION INTRAMUSCULAR; INTRAVENOUS at 08:06

## 2022-01-01 RX ADMIN — PANTOPRAZOLE SODIUM 40 MG: 40 TABLET, DELAYED RELEASE ORAL at 12:05

## 2022-01-01 RX ADMIN — BUDESONIDE 0.5 MG: 0.5 INHALANT RESPIRATORY (INHALATION) at 11:06

## 2022-01-01 RX ADMIN — VANCOMYCIN HYDROCHLORIDE 1000 MG: 1 INJECTION, POWDER, LYOPHILIZED, FOR SOLUTION INTRAVENOUS at 02:06

## 2022-01-01 RX ADMIN — BENZONATATE 200 MG: 100 CAPSULE ORAL at 09:05

## 2022-01-01 RX ADMIN — NYSTATIN 500000 UNITS: 100000 SUSPENSION ORAL at 09:05

## 2022-01-01 RX ADMIN — VANCOMYCIN HYDROCHLORIDE 500 MG: 500 INJECTION, POWDER, LYOPHILIZED, FOR SOLUTION INTRAVENOUS at 02:06

## 2022-01-01 RX ADMIN — SODIUM CHLORIDE: 0.9 INJECTION, SOLUTION INTRAVENOUS at 10:05

## 2022-01-01 RX ADMIN — ASPIRIN 81 MG: 81 TABLET, DELAYED RELEASE ORAL at 08:05

## 2022-01-01 RX ADMIN — GUAIFENESIN 600 MG: 600 TABLET, EXTENDED RELEASE ORAL at 09:06

## 2022-01-01 RX ADMIN — MEROPENEM AND SODIUM CHLORIDE 1 G: 1 INJECTION, SOLUTION INTRAVENOUS at 02:06

## 2022-01-01 RX ADMIN — METRONIDAZOLE 500 MG: 500 INJECTION, SOLUTION INTRAVENOUS at 02:05

## 2022-01-01 RX ADMIN — POTASSIUM CHLORIDE 150 ML/HR: 149 INJECTION, SOLUTION, CONCENTRATE INTRAVENOUS at 10:05

## 2022-01-01 RX ADMIN — METHYLPREDNISOLONE SODIUM SUCCINATE 60 MG: 125 INJECTION, POWDER, FOR SOLUTION INTRAMUSCULAR; INTRAVENOUS at 05:05

## 2022-01-01 RX ADMIN — THERA TABS 1 TABLET: TAB at 02:06

## 2022-01-01 RX ADMIN — PRAVASTATIN SODIUM 80 MG: 40 TABLET ORAL at 09:05

## 2022-01-01 RX ADMIN — ALBUTEROL SULFATE 2.5 MG: 2.5 SOLUTION RESPIRATORY (INHALATION) at 08:06

## 2022-01-01 RX ADMIN — ASPIRIN 81 MG 81 MG: 81 TABLET ORAL at 02:06

## 2022-01-01 RX ADMIN — MELATONIN 6 MG: at 09:06

## 2022-01-01 RX ADMIN — POTASSIUM CHLORIDE 150 ML/HR: 149 INJECTION, SOLUTION, CONCENTRATE INTRAVENOUS at 12:05

## 2022-01-01 RX ADMIN — ASPIRIN 81 MG 81 MG: 81 TABLET ORAL at 05:06

## 2022-01-01 RX ADMIN — NYSTATIN 500000 UNITS: 100000 SUSPENSION ORAL at 04:05

## 2022-01-01 RX ADMIN — Medication 800 MG: at 05:05

## 2022-01-01 RX ADMIN — LEVALBUTEROL HYDROCHLORIDE 0.63 MG: 0.63 SOLUTION RESPIRATORY (INHALATION) at 11:06

## 2022-01-01 RX ADMIN — GUAIFENESIN 600 MG: 600 TABLET, EXTENDED RELEASE ORAL at 12:05

## 2022-01-01 RX ADMIN — FAMOTIDINE 20 MG: 20 TABLET ORAL at 08:06

## 2022-01-01 RX ADMIN — VANCOMYCIN HYDROCHLORIDE 500 MG: 500 INJECTION, POWDER, LYOPHILIZED, FOR SOLUTION INTRAVENOUS at 07:05

## 2022-01-01 RX ADMIN — MEROPENEM AND SODIUM CHLORIDE 1 G: 1 INJECTION, SOLUTION INTRAVENOUS at 10:06

## 2022-04-28 NOTE — PROGRESS NOTES
Subjective:       Patient ID: Jia Vega is a 71 y.o. female.    Chief Complaint: Cough, Hypertension, Hyperlipidemia, Gastroesophageal Reflux, and Bladder incontinence    Patient is a 71-year-old  female who comes for follow-up after long time.    Last year was busy with her  with diagnosis of cancer and attending to her needs.    Her medical issues include the following:-    1.-hypertension  2.-hyperlipidemia  3.-mood disorder with stress and anxiety    She complains of a cough and congestion for the last several days or so.  No definite fever.  Expectoration is clear.  She states that she has tested herself at home for COVID-19 and was negative.  As to what kit she has used is unclear to me at this point.  Patient seems to get flustered when I ask her the details about the testing.    No other family member is sick.    She has a chronic smoker.  About quarter pack to half a pack per day.  Not ready to quit smoking at this point.  Too much stress in life.    Hypertension  This is a chronic problem. The current episode started more than 1 year ago. Progression since onset: BLOOD PRESSURES ARE ON THE LOW SIDE. The problem is controlled. Associated symptoms include anxiety, malaise/fatigue and shortness of breath. Pertinent negatives include no chest pain, headaches or palpitations. Risk factors for coronary artery disease include sedentary lifestyle, dyslipidemia and smoking/tobacco exposure. Past treatments include calcium channel blockers and ACE inhibitors. The current treatment provides moderate improvement. Compliance problems include psychosocial issues.  There is no history of pheochromocytoma or renovascular disease.   Depression  Visit Type: follow-up  Patient presents with the following symptoms: anhedonia, compulsions, muscle tension, nervousness/anxiety and shortness of breath.  Patient is not experiencing: confusion and palpitations.    Hyperlipidemia  This is a chronic problem.  The current episode started more than 1 year ago. The problem is controlled. Exacerbating diseases include obesity. Associated symptoms include shortness of breath. Pertinent negatives include no chest pain. Current antihyperlipidemic treatment includes statins. The current treatment provides moderate improvement of lipids. Risk factors for coronary artery disease include a sedentary lifestyle, hypertension, obesity, dyslipidemia and post-menopausal.   Gastroesophageal Reflux  She complains of coughing and heartburn. She reports no abdominal pain, no chest pain or no nausea. Slightly dysphoric mood.  Does not remember if she is on Zoloft.. This is a chronic problem. The problem occurs occasionally. The problem has been waxing and waning. Pertinent negatives include no fatigue. Risk factors include smoking/tobacco exposure, obesity, lack of exercise and caffeine use. She has tried a PPI for the symptoms. The treatment provided moderate relief. Past invasive treatments do not include gastroplasty, gastroplication or reflux surgery.   Anxiety  Presents for follow-up visit. Symptoms include compulsions, muscle tension, nervous/anxious behavior and shortness of breath. Patient reports no chest pain, confusion, dizziness, nausea or palpitations. Primary symptoms comment: Slightly dysphoric mood.  Does not remember if she is on Zoloft.. Symptoms occur occasionally. The severity of symptoms is moderate. The quality of sleep is fair.     Compliance with medications is 26-50%.       Past Medical History:   Diagnosis Date    Arthritis     Asthma     Full dentures     GERD (gastroesophageal reflux disease)     Hypertension     Seasonal allergies     Wears glasses      Social History     Socioeconomic History    Marital status:     Number of children: 2   Occupational History    Occupation: Seamstress retired   Tobacco Use    Smoking status: Current Every Day Smoker     Packs/day: 0.25     Years: 40.00     Pack  years: 10.00     Types: Cigarettes    Smokeless tobacco: Never Used    Tobacco comment: patient states 3 cigs per day   Substance and Sexual Activity    Alcohol use: No     Alcohol/week: 1.7 standard drinks     Types: 2 Standard drinks or equivalent per week    Drug use: No    Sexual activity: Not Currently   Social History Narrative    2 boys from previous relationship     Social Determinants of Health     Financial Resource Strain: Medium Risk    Difficulty of Paying Living Expenses: Somewhat hard   Food Insecurity: No Food Insecurity    Worried About Running Out of Food in the Last Year: Never true    Ran Out of Food in the Last Year: Never true   Transportation Needs: No Transportation Needs    Lack of Transportation (Medical): No    Lack of Transportation (Non-Medical): No   Physical Activity: Inactive    Days of Exercise per Week: 0 days    Minutes of Exercise per Session: 0 min   Stress: Stress Concern Present    Feeling of Stress : To some extent   Social Connections: Moderately Isolated    Frequency of Communication with Friends and Family: Twice a week    Frequency of Social Gatherings with Friends and Family: Twice a week    Attends [employer] Services: Never    Active Member of Clubs or Organizations: No    Attends Club or Organization Meetings: Never    Marital Status:    Housing Stability: Low Risk     Unable to Pay for Housing in the Last Year: No    Number of Places Lived in the Last Year: 1    Unstable Housing in the Last Year: No     Past Surgical History:   Procedure Laterality Date    BACK SURGERY      cubital tunnel release  left    HAND SURGERY      right and left: ganglion cyst    HYSTERECTOMY  BSO    NECK SURGERY       Family History   Family history unknown: Yes       Review of Systems   Constitutional: Positive for malaise/fatigue and unexpected weight change (Patient has lost more than 20 lb of weight which she is not aware of.). Negative for activity  "change, appetite change, chills, diaphoresis, fatigue and fever.   HENT: Positive for congestion and postnasal drip. Negative for voice change.    Respiratory: Positive for cough and shortness of breath. Negative for apnea.         Chronic smoker with underlying COPD and emphysema.   Cardiovascular: Negative for chest pain, palpitations and leg swelling.        LOW BLOOD PRESSURES TODAY.   Gastrointestinal: Positive for heartburn. Negative for abdominal distention, abdominal pain and nausea.   Genitourinary: Positive for enuresis and frequency. Negative for dysuria.        Bladder incontinence symptoms.   Neurological: Negative for dizziness, speech difficulty and headaches.   Psychiatric/Behavioral: Positive for behavioral problems and depression. Negative for confusion. The patient is nervous/anxious.          Objective:      Blood pressure (!) 88/60, pulse (!) 114, temperature 98 °F (36.7 °C), temperature source Oral, height 5' 7" (1.702 m), weight 83.5 kg (184 lb), SpO2 98 %. Body mass index is 28.82 kg/m².  Physical Exam  Vitals and nursing note reviewed.   Constitutional:       General: She is not in acute distress.     Appearance: She is well-developed. She is not ill-appearing, toxic-appearing or diaphoretic.      Comments: Patient does not appear to be toxic.   HENT:      Head: Normocephalic and atraumatic.      Nose: Mucosal edema and rhinorrhea present.      Mouth/Throat:      Dentition: Has dentures (upper and lower).   Eyes:      Conjunctiva/sclera: Conjunctivae normal.   Neck:      Trachea: Trachea normal.   Cardiovascular:      Rate and Rhythm: Normal rate and regular rhythm.      Heart sounds: Normal heart sounds, S1 normal and S2 normal.   Pulmonary:      Effort: No respiratory distress.      Breath sounds: Examination of the right-middle field reveals decreased breath sounds. Examination of the left-middle field reveals decreased breath sounds. Decreased breath sounds present. No wheezing or rales. "   Chest:      Chest wall: No tenderness.   Abdominal:      Palpations: Abdomen is soft.      Tenderness: There is no abdominal tenderness.      Comments: .   Musculoskeletal:         General: No tenderness or deformity.      Cervical back: Neck supple.      Right lower leg: No edema.      Left lower leg: No edema.   Lymphadenopathy:      Cervical: No cervical adenopathy.   Skin:     General: Skin is warm and dry.      Findings: No rash.   Neurological:      Mental Status: She is alert. Mental status is at baseline.   Psychiatric:         Mood and Affect: Mood is anxious.         Behavior: Behavior is not slowed or withdrawn. Behavior is cooperative.      Comments: Chronically anxious and tends to get ornery and snappy easily.            Assessment:       1. Bronchitis with bronchospasm    2. Benign essential hypertension    3. Mixed stress and urge urinary incontinence    4. Panlobular emphysema    5. Hypercholesterolemia    6. Gastroesophageal reflux disease, unspecified whether esophagitis present           No visits with results within 3 Month(s) from this visit.   Latest known visit with results is:   Lab Visit on 11/05/2021   Component Date Value Ref Range Status    Sodium 11/05/2021 140  136 - 145 mmol/L Final    Potassium 11/05/2021 4.1  3.5 - 5.1 mmol/L Final    Chloride 11/05/2021 106  95 - 110 mmol/L Final    CO2 11/05/2021 23  23 - 29 mmol/L Final    Glucose 11/05/2021 126 (A) 70 - 110 mg/dL Final    BUN 11/05/2021 13  8 - 23 mg/dL Final    Creatinine 11/05/2021 0.7  0.5 - 1.4 mg/dL Final    Calcium 11/05/2021 9.1  8.7 - 10.5 mg/dL Final    Total Protein 11/05/2021 7.7  6.0 - 8.4 g/dL Final    Albumin 11/05/2021 3.8  3.5 - 5.2 g/dL Final    Total Bilirubin 11/05/2021 0.6  0.1 - 1.0 mg/dL Final    Alkaline Phosphatase 11/05/2021 80  55 - 135 U/L Final    AST 11/05/2021 23  10 - 40 U/L Final    ALT 11/05/2021 20  10 - 44 U/L Final    Anion Gap 11/05/2021 11  8 - 16 mmol/L Final    eGFR if   11/05/2021 >60.0  >60 mL/min/1.73 m^2 Final    eGFR if non African American 11/05/2021 >60.0  >60 mL/min/1.73 m^2 Final    Cholesterol 11/05/2021 198  120 - 199 mg/dL Final    Triglycerides 11/05/2021 113  30 - 150 mg/dL Final    HDL 11/05/2021 47  40 - 75 mg/dL Final    LDL Cholesterol 11/05/2021 128.4  63.0 - 159.0 mg/dL Final    HDL/Cholesterol Ratio 11/05/2021 23.7  20.0 - 50.0 % Final    Total Cholesterol/HDL Ratio 11/05/2021 4.2  2.0 - 5.0 Final    Non-HDL Cholesterol 11/05/2021 151  mg/dL Final    Microalbumin, Urine 11/05/2021 72.8 (A) <19.9 ug/mL Final    Creatinine, Urine 11/05/2021 243.0  15.0 - 325.0 mg/dL Final    Microalb/Creat Ratio 11/05/2021 30.0  0.0 - 30.0 ug/mg Final    Vit D, 25-Hydroxy 11/05/2021 56  30 - 96 ng/mL Final         Plan:           Bronchitis with bronchospasm  -     albuterol (PROVENTIL/VENTOLIN HFA) 90 mcg/actuation inhaler; INHALE 1 PUFF BY MOUTH EVERY 4 TO 6 HOURS AS NEEDED FOR WHEEZING OR SHORTNESS OF BREATH  Dispense: 8.5 g; Refill: 2  -     guaiFENesin (MUCINEX) 600 mg 12 hr tablet; Take 2 tablets (1,200 mg total) by mouth 2 (two) times daily.  Dispense: 20 tablet; Refill: 0  -     predniSONE (DELTASONE) 20 MG tablet; Take 1 tablet (20 mg total) by mouth once daily. for 4 days  Dispense: 8 tablet; Refill: 0  -     promethazine-dextromethorphan (PROMETHAZINE-DM) 6.25-15 mg/5 mL Syrp; Take 5 mLs by mouth every 8 (eight) hours as needed (As needed for cough and congestion). Do not drive on this medication because of drowsiness.  Dispense: 120 mL; Refill: 0    Benign essential hypertension  Comments:  Patient's blood pressures are low.  I have advised her to check her blood pressures and if they are indeed low she may want to hold the medications.  Orders:  -     amlodipine-olmesartan (ALICE) 5-20 mg per tablet; Take 1 tablet by mouth Daily. Please check your blood pressures at home.  If they are low, hold your blood pressure medications.  Dispense: 90  tablet; Refill: 1    Mixed stress and urge urinary incontinence  -     oxybutynin (DITROPAN) 5 MG Tab; TAKE 1 TABLET BY MOUTH TWICE DAILY( WATCH FOR DRY MOUTH AND CONSTIPATION)  Dispense: 60 tablet; Refill: 5    Panlobular emphysema  -     albuterol (PROVENTIL/VENTOLIN HFA) 90 mcg/actuation inhaler; INHALE 1 PUFF BY MOUTH EVERY 4 TO 6 HOURS AS NEEDED FOR WHEEZING OR SHORTNESS OF BREATH  Dispense: 8.5 g; Refill: 2  -     guaiFENesin (MUCINEX) 600 mg 12 hr tablet; Take 2 tablets (1,200 mg total) by mouth 2 (two) times daily.  Dispense: 20 tablet; Refill: 0    Hypercholesterolemia    Gastroesophageal reflux disease, unspecified whether esophagitis present  -     omeprazole (PRILOSEC) 40 MG capsule; Take 1 capsule (40 mg total) by mouth once daily.  Dispense: 30 capsule; Refill: 5      Patient's symptoms of acute bronchitis have been noted and she has been advised to quit smoking.  Conservative treatment with prednisone, guaifenesin and promethazine DM.  Do not drive on promethazine DM.    Her blood pressures are somewhat on the low side with weight loss.  Not sure as to the reason.  I tried to discuss about this issue with the patient but she is in no mood to listen to all of this.  Her medications have been refilled and I have advised her not to take her blood pressure medications if her blood pressures are indeed low.    Please note that she is not very symptomatic with dizziness or lightheadedness.  She should be measure her blood pressures at home also.  In the office blood pressure determination was somewhat difficult with complains of pain with the standard blood pressure cuff and it had to be done manually by my LPN miss Alicia Camachoerne.    There are several preventive care issues which are pending at this point.    Her medications have been refilled for blood pressure, cholesterol and reflux.    Follow-up will be arranged accordingly if no better and at least by 6 months.  If she chooses to  follow-up.    Compliance is somewhat poor.      Current Outpatient Medications:     aspirin (ECOTRIN) 81 MG EC tablet, Take 1 tablet (81 mg total) by mouth Daily., Disp: 100 tablet, Rfl: 4    multivitamin capsule, Take 1 capsule by mouth once daily., Disp: , Rfl:     pravastatin (PRAVACHOL) 80 MG tablet, Take 1 tablet (80 mg total) by mouth every evening., Disp: 90 tablet, Rfl: 0    sertraline (ZOLOFT) 50 MG tablet, TAKE 1 TABLET(50 MG) BY MOUTH EVERY NIGHT, Disp: 90 tablet, Rfl: 0    VITAMIN B COMPLEX (B COMPLEX 1 ORAL), Take 1 tablet by mouth Daily., Disp: , Rfl:     albuterol (PROVENTIL/VENTOLIN HFA) 90 mcg/actuation inhaler, INHALE 1 PUFF BY MOUTH EVERY 4 TO 6 HOURS AS NEEDED FOR WHEEZING OR SHORTNESS OF BREATH, Disp: 8.5 g, Rfl: 2    amlodipine-olmesartan (ALICE) 5-20 mg per tablet, Take 1 tablet by mouth Daily. Please check your blood pressures at home.  If they are low, hold your blood pressure medications., Disp: 90 tablet, Rfl: 1    guaiFENesin (MUCINEX) 600 mg 12 hr tablet, Take 2 tablets (1,200 mg total) by mouth 2 (two) times daily., Disp: 20 tablet, Rfl: 0    ibuprofen (ADVIL,MOTRIN) 600 MG tablet, 1 tablet daily as needed., Disp: , Rfl: 1    omeprazole (PRILOSEC) 40 MG capsule, Take 1 capsule (40 mg total) by mouth once daily., Disp: 30 capsule, Rfl: 5    oxybutynin (DITROPAN) 5 MG Tab, TAKE 1 TABLET BY MOUTH TWICE DAILY( WATCH FOR DRY MOUTH AND CONSTIPATION), Disp: 60 tablet, Rfl: 5    predniSONE (DELTASONE) 20 MG tablet, Take 1 tablet (20 mg total) by mouth once daily. for 4 days, Disp: 8 tablet, Rfl: 0    promethazine-dextromethorphan (PROMETHAZINE-DM) 6.25-15 mg/5 mL Syrp, Take 5 mLs by mouth every 8 (eight) hours as needed (As needed for cough and congestion). Do not drive on this medication because of drowsiness., Disp: 120 mL, Rfl: 0

## 2022-05-05 NOTE — TELEPHONE ENCOUNTER
Pt called she needs more cough meds and she wants something for SOB      Bronchitis with bronchospasm  -     promethazine-dextromethorphan (PROMETHAZINE-DM) 6.25-15 mg/5 mL Syrp; Take 5 mLs by mouth every 8 (eight) hours as needed (As needed for cough and congestion). Do not drive on this medication because of drowsiness.  Dispense: 120 mL; Refill: 0  -     albuterol (PROVENTIL/VENTOLIN HFA) 90 mcg/actuation inhaler; INHALE 1 PUFF BY MOUTH EVERY 4 TO 6 HOURS AS NEEDED FOR WHEEZING OR SHORTNESS OF BREATH  Dispense: 8.5 g; Refill: 2    Panlobular emphysema  -     albuterol (PROVENTIL/VENTOLIN HFA) 90 mcg/actuation inhaler; INHALE 1 PUFF BY MOUTH EVERY 4 TO 6 HOURS AS NEEDED FOR WHEEZING OR SHORTNESS OF BREATH  Dispense: 8.5 g; Refill: 2

## 2022-05-15 PROBLEM — J18.9 PNEUMONIA OF RIGHT LOWER LOBE DUE TO INFECTIOUS ORGANISM: Status: ACTIVE | Noted: 2022-01-01

## 2022-05-15 PROBLEM — R65.20 SEVERE SEPSIS: Status: ACTIVE | Noted: 2022-01-01

## 2022-05-15 PROBLEM — E83.52 HYPERCALCEMIA: Status: ACTIVE | Noted: 2022-01-01

## 2022-05-15 PROBLEM — J96.01 ACUTE HYPOXEMIC RESPIRATORY FAILURE: Status: ACTIVE | Noted: 2022-01-01

## 2022-05-15 PROBLEM — A41.9 SEVERE SEPSIS: Status: ACTIVE | Noted: 2022-01-01

## 2022-05-15 NOTE — ED PROVIDER NOTES
Encounter Date: 5/15/2022       History     Chief Complaint   Patient presents with    Shortness of Breath     X 10 DAYS, TODAY SON CAME OVER AND SAW O2 SATS WERE IN 80s    GEN WEAKNESS    Cough     This is a 71-year-old female with a history of bronchitis who recently stopped smoking approximately a month ago who presents complaining of coughing and worsening shortness of breath.  The patient states she was diagnosed with bronchitis and given several medications approximately 3 weeks ago.  She states she does not know with the medicines were.  She presents now she has had progressively worsening coughing and shortness of breath with a poor appetite over the past 3 days.  She denies a fever she denies nausea or vomiting.  She has generalized nonfocal weakness and fatigue.  She is unaware of any fever.  She denies any headache neck pain or stiffness.  She denies abdominal pain nausea vomiting or diarrhea but her appetite has been poor.  She denies any focal weakness numbness tingling or paresthesias.  She denies any other problems or complaints.  Upon initials assessment sats were noted to be in the 80s so the patient was placed on supplemental oxygen with improvement.  She currently reports feeling better after being placed on oxygen.        Review of patient's allergies indicates:  No Known Allergies  Past Medical History:   Diagnosis Date    Arthritis     Asthma     Full dentures     GERD (gastroesophageal reflux disease)     Hypertension     Seasonal allergies     Wears glasses      Past Surgical History:   Procedure Laterality Date    BACK SURGERY      cubital tunnel release  left    HAND SURGERY      right and left: ganglion cyst    HYSTERECTOMY  BSO    NECK SURGERY       Family History   Family history unknown: Yes     Social History     Tobacco Use    Smoking status: Current Every Day Smoker     Packs/day: 0.25     Years: 40.00     Pack years: 10.00     Types: Cigarettes    Smokeless  tobacco: Never Used    Tobacco comment: patient states 3 cigs per day   Substance Use Topics    Alcohol use: No     Alcohol/week: 1.7 standard drinks     Types: 2 Standard drinks or equivalent per week    Drug use: No     Review of Systems   Constitutional: Positive for activity change, appetite change and fatigue. Negative for fever.   HENT: Positive for congestion. Negative for dental problem, ear discharge, ear pain, facial swelling, nosebleeds, rhinorrhea, sore throat, trouble swallowing and voice change.    Eyes: Negative.    Respiratory: Positive for cough, shortness of breath and wheezing.    Cardiovascular: Negative.  Negative for chest pain, palpitations and leg swelling.   Gastrointestinal: Negative.  Negative for abdominal pain, blood in stool, diarrhea, nausea and vomiting.   Genitourinary: Negative.    Musculoskeletal: Negative.    Skin: Negative.    Neurological: Negative.  Negative for dizziness, tremors, seizures, facial asymmetry, speech difficulty, light-headedness, numbness and headaches.        Nonfocal generalized weakness and fatigue without focal weakness.   Psychiatric/Behavioral: Negative.    All other systems reviewed and are negative.      Physical Exam     Initial Vitals   BP Pulse Resp Temp SpO2   05/15/22 1717 05/15/22 1717 05/15/22 1717 05/15/22 1720 05/15/22 1717   (!) 79/62 (!) 120 (!) 28 97.9 °F (36.6 °C) (!) 84 %      MAP       --                Physical Exam    Nursing note and vitals reviewed.  Constitutional: She is not diaphoretic. She is cooperative. She appears ill. She appears distressed.   HENT:   Head: Normocephalic and atraumatic.   Nose: Nose normal.   Mouth/Throat: Uvula is midline, oropharynx is clear and moist and mucous membranes are normal. No oral lesions. No uvula swelling. No oropharyngeal exudate, posterior oropharyngeal edema or posterior oropharyngeal erythema.   Eyes: Conjunctivae, EOM and lids are normal. Pupils are equal, round, and reactive to light. No  scleral icterus.   Neck: Trachea normal and phonation normal. Neck supple. No stridor present. No JVD present.   Normal range of motion.   Full passive range of motion without pain.     Cardiovascular: Normal rate, regular rhythm, normal heart sounds, intact distal pulses and normal pulses. Exam reveals no gallop, no distant heart sounds and no friction rub.    No murmur heard.  Pulmonary/Chest: Accessory muscle usage present. No stridor. Tachypnea noted. She is in respiratory distress. She has decreased breath sounds. She has wheezes. She has rhonchi. She has no rales.   Abdominal: Abdomen is soft. Bowel sounds are normal. She exhibits no distension, no pulsatile midline mass and no mass. There is no abdominal tenderness. There is no rigidity and no guarding.   Musculoskeletal:         General: No tenderness or edema. Normal range of motion.      Right hand: Normal. Normal range of motion. Normal strength. Normal sensation. Normal capillary refill. Normal pulse.      Left hand: Normal. Normal range of motion. Normal strength. Normal sensation. Normal capillary refill. Normal pulse.      Cervical back: Normal, full passive range of motion without pain, normal range of motion and neck supple. No edema, rigidity or bony tenderness. No spinous process tenderness. Normal range of motion.      Thoracic back: Normal. No bony tenderness. Normal range of motion.      Lumbar back: Normal. No bony tenderness. Normal range of motion.      Right foot: Normal. Normal range of motion and normal capillary refill. No tenderness or bony tenderness. Normal pulse.      Left foot: Normal. Normal range of motion and normal capillary refill. No tenderness or bony tenderness. Normal pulse.      Comments: Pulses are 2+ throughout, cap refill is less than 2 sec throughout, extremities are nontender throughout with full range of motion. There is no spinal tenderness to palpation.     Neurological: She is alert and oriented to person, place,  and time. She has normal strength. She displays normal reflexes. No cranial nerve deficit or sensory deficit. Gait normal.   No focal deficits.   Skin: Skin is warm, dry and intact. Capillary refill takes less than 2 seconds. No ecchymosis, no petechiae and no rash noted. No erythema. No pallor.   Psychiatric: She has a normal mood and affect. Her speech is normal and behavior is normal. Judgment and thought content normal. Cognition and memory are normal.         ED Course   Procedures  Labs Reviewed   CBC W/ AUTO DIFFERENTIAL - Abnormal; Notable for the following components:       Result Value    WBC 18.76 (*)     MCV 81 (*)     MCH 25.7 (*)     MCHC 31.8 (*)     RDW 14.7 (*)     Platelets 540 (*)     MPV 8.8 (*)     Immature Granulocytes 0.7 (*)     Gran # (ANC) 15.8 (*)     Immature Grans (Abs) 0.13 (*)     Gran % 84.3 (*)     Lymph % 9.9 (*)     All other components within normal limits   COMPREHENSIVE METABOLIC PANEL - Abnormal; Notable for the following components:    Potassium 2.7 (*)     Chloride 94 (*)     CO2 30 (*)     Glucose 148 (*)     BUN 32 (*)     Calcium 14.3 (*)     Albumin 2.3 (*)     eGFR if  52.9 (*)     eGFR if non  45.9 (*)     All other components within normal limits    Narrative:     CALCIUM AND POTASSIUM    critical result(s) called and verbal   readback obtained from QUANG ANN RN ER. by [facility] 05/15/2022 19:07  Recoll. [id number] by [facility] at 05/15/2022 18:17, reason: QUESTIONABLE   RESULTS   LACTIC ACID, PLASMA - Abnormal; Notable for the following components:    Lactate (Lactic Acid) 2.9 (*)     All other components within normal limits    Narrative:     LACTIC ACID   critical result(s) called and verbal readback obtained   from  MIAN ORNELAS RN ER. by [facility] 05/15/2022 19:24   URINALYSIS, REFLEX TO URINE CULTURE - Abnormal; Notable for the following components:    Appearance, UA Cloudy (*)     Protein, UA 2+ (*)     Bilirubin (UA) 1+ (*)     Occult  Blood UA 3+ (*)     Leukocytes, UA 3+ (*)     All other components within normal limits    Narrative:     Specimen Source->Urine   APTT - Abnormal; Notable for the following components:    aPTT 22.0 (*)     All other components within normal limits   PROTIME-INR - Abnormal; Notable for the following components:    PT 14.6 (*)     All other components within normal limits   URINALYSIS MICROSCOPIC - Abnormal; Notable for the following components:    RBC, UA 9 (*)     WBC, UA >100 (*)     Hyaline Casts, UA 2370 (*)     All other components within normal limits    Narrative:     Specimen Source->Urine   ISTAT PROCEDURE - Abnormal; Notable for the following components:    POC PH 7.503 (*)     POC PCO2 46.0 (*)     POC HCO3 36.1 (*)     POC SATURATED O2 86 (*)     POC TCO2 37 (*)     All other components within normal limits   MAGNESIUM    Narrative:     Recoll. [id number] by TC1 at 05/15/2022 18:17, reason: QUESTIONABLE   RESULTS   PHOSPHORUS    Narrative:     Recoll. [id number] by TC1 at 05/15/2022 18:17, reason: QUESTIONABLE   RESULTS   B-TYPE NATRIURETIC PEPTIDE   LIPASE    Narrative:     Recoll. [id number] by TC1 at 05/15/2022 18:17, reason: QUESTIONABLE   RESULTS   TROPONIN I    Narrative:     Recoll. [id number] by TC1 at 05/15/2022 18:17, reason: QUESTIONABLE   RESULTS   SARS-COV-2 RNA AMPLIFICATION, QUAL   INFLUENZA A AND B ANTIGEN    Narrative:     Specimen Source->Nasopharyngeal Swab   POCT GLUCOSE MONITORING CONTINUOUS        ECG Results          EKG 12-lead (In process)  Result time 05/16/22 07:38:32    In process by Interface, Lab In [facility] (05/16/22 07:38:32)                 Narrative:    Test Reason : R06.02,    Vent. Rate : 107 BPM     Atrial Rate : 107 BPM     P-R Int : 132 ms          QRS Dur : 078 ms      QT Int : 332 ms       P-R-T Axes : 082 026 017 degrees     QTc Int : 443 ms    Sinus tachycardia with Fusion complexes  Nonspecific ST abnormality  Abnormal ECG  No previous ECGs  available    Referred By: [email or web address]   SELF           Confirmed By:                             Imaging Results          X-Ray Chest AP Portable (Final result)  Result time 05/15/22 18:31:02    Final result by Kevon Dennis Jr., MD (05/15/22 18:31:02)                 Narrative:    Examination: Single view the chest.    CLINICAL HISTORY: Shortness of breath and weakness.    TECHNIQUE: Erect AP view the chest.    FINDINGS: Heart is upper normal in size. Large area alveolar consolidation occupying virtually the entirety of the right lower lobe causing partial silhouetting of the right hemidiaphragm. Left lung is clear. Few scattered linear striations in the left lower lobe secondary to scarring/atelectasis. Pulmonary vascularity is not engorged. Patient has an anterior cervical fusion plate occupy the uppermost field of view.    IMPRESSION: Right lower lobe pneumonia. Recommend radiographic follow-up until clearing.    Electronically signed by:  Kevon Dennis MD  5/15/2022 6:31 PM CDT Workstation: [id number]                                 Medical Decision Making:   Clinical Tests:   Lab Tests: Reviewed  Radiological Study: Reviewed  Medical Tests: Reviewed  ED Management:  Hypoxemia-- NRB followed by vapotherm with improvement, CXR with RLL pneumonia, Blood cx obtained, IV abx given, fluid resuscitation initiated , suspicion for underlying malignancy, I have discussed the case in detail with the hospitalist provider who has assumed care and will admit                      Clinical Impression:   Final diagnoses:  [R06.02] SOB (shortness of breath)  [J18.9] Pneumonia due to infectious organism, unspecified laterality, unspecified part of lung (Primary)  [E87.6] Hypokalemia  [E83.52] Hypercalcemia  [J96.01] Acute hypoxemic respiratory failure  [J96.01] Acute respiratory failure with hypoxia          ED Disposition Condition    Admit               Antonina Espostio MD  05/17/22 0651

## 2022-05-16 PROBLEM — R82.81 PYURIA: Status: ACTIVE | Noted: 2022-01-01

## 2022-05-16 NOTE — CARE UPDATE
05/16/22 0755   PRE-TX-O2   O2 Device (Oxygen Therapy) Vapotherm   $ Is the patient on High Flow Oxygen? Yes   Flow (L/min) 30   Oxygen Concentration (%) 50   SpO2 (!) 91 %   Pulse Oximetry Type Continuous   $ Pulse Oximetry - Multiple Charge Pulse Oximetry - Multiple   Pulse 67   Resp 15   Respiratory Evaluation   $ Care Plan Tech Time 15 min

## 2022-05-16 NOTE — H&P
"Formerly [facility], [facility] - Emergency Dept  Hospital Medicine  History & Physical    Patient Name: Jia Vega  MRN: [id number]  Patient Class: IP- Inpatient  Admission Date: 5/15/2022  Attending Physician: Baldomero Grigsby MD  Primary Care Provider: Patrick Olson MD         Patient information was obtained from patient, spouse/SO and ER records.     Subjective:     Principal Problem:Acute hypoxemic respiratory failure    Chief Complaint:   Chief Complaint   Patient presents with    Shortness of Breath     X 10 DAYS, TODAY SON CAME OVER AND SAW O2 SATS WERE IN 80s    GEN WEAKNESS    Cough        HPI: 71 year old female with history of GERD, Asthma, life long nicotine addiction and has had 2/3 COVID vaccinations presented to ED complaining of 7-10 days of progressively worsening SOB, worsening cough with scant sputum, and unquenchable thirst ("I can't stop drinking ice water"). She denied any chest pain. No orthopnea, PND, nor pedal edema. She has "Bone pain" in her legs and lower back--7/10 constant aching worse with palpation and movement. When she presented she was hypoxic, eventually requiring Vapotherm to maintain good saturation. She was initially tachycardic and hypotensive, but responded well to the 30 ml/kg NS for sepsis.    In ED: labs reviewed: Leukocytosis with normal H/H; hypokalemia (treated in ED, and sliding scale started) with stage 3a/b renal dysfunction, non-fasting hyperglycemia, severe hypercalcemia and hypoalbuminemia (calculated Calcium = 15.98).; elevated lactate. Flu and COVID negative. U/A: dirty urine. Patient was pan-cultured. CXR reviewed: large RLL pneumonia (no history of N/V). EKG reviewed: sinus tach without acute segments. Discussed with Nephrology which was current preferred agent to use in addition to NS for her hypercalcemia. Started NS infusion 150 ml/hr after the 30 ml/kg had finished and Aredia 30 mg iv X 1 dose; broad spectrum antibiotics started with nebs and " steroids  Critical Care time spent in direct patient contact 50 minutes    Past Medical History:   Diagnosis Date    Arthritis     Asthma     Full dentures     GERD (gastroesophageal reflux disease)     Hypertension     Seasonal allergies     Wears glasses        Past Surgical History:   Procedure Laterality Date    BACK SURGERY      cubital tunnel release  left    HAND SURGERY      right and left: ganglion cyst    HYSTERECTOMY  BSO    NECK SURGERY         Review of patient's allergies indicates:  No Known Allergies    No current facility-administered medications on file prior to encounter.     Current Outpatient Medications on File Prior to Encounter   Medication Sig    albuterol (PROVENTIL/VENTOLIN HFA) 90 mcg/actuation inhaler INHALE 1 PUFF BY MOUTH EVERY 4 TO 6 HOURS AS NEEDED FOR WHEEZING OR SHORTNESS OF BREATH (Patient taking differently: Inhale 1 puff into the lungs every 4 to 6 hours as needed. INHALE 1 PUFF BY MOUTH EVERY 4 TO 6 HOURS AS NEEDED FOR WHEEZING OR SHORTNESS OF BREATH)    amlodipine-olmesartan (ALICE) 5-20 mg per tablet TAKE 1 TABLET BY MOUTH DAILY (Patient taking differently: Take 1 tablet by mouth once daily.)    aspirin (ECOTRIN) 81 MG EC tablet Take 1 tablet (81 mg total) by mouth Daily.    guaiFENesin (MUCINEX) 600 mg 12 hr tablet Take 2 tablets (1,200 mg total) by mouth 2 (two) times daily.    multivitamin capsule Take 1 capsule by mouth once daily.    omeprazole (PRILOSEC) 40 MG capsule Take 1 capsule (40 mg total) by mouth once daily.    oxybutynin (DITROPAN) 5 MG Tab TAKE 1 TABLET BY MOUTH TWICE DAILY( WATCH FOR DRY MOUTH AND CONSTIPATION) (Patient taking differently: Take 5 mg by mouth 2 (two) times daily. TAKE 1 TABLET BY MOUTH TWICE DAILY( WATCH FOR DRY MOUTH AND CONSTIPATION))    pravastatin (PRAVACHOL) 80 MG tablet Take 1 tablet (80 mg total) by mouth every evening.    promethazine-dextromethorphan (PROMETHAZINE-DM) 6.25-15 mg/5 mL Syrp Take 5 mLs by mouth every  8 (eight) hours as needed (As needed for cough and congestion). Do not drive on this medication because of drowsiness.    sertraline (ZOLOFT) 50 MG tablet TAKE 1 TABLET(50 MG) BY MOUTH EVERY NIGHT (Patient taking differently: Take 50 mg by mouth every evening.)    VITAMIN B COMPLEX (B COMPLEX 1 ORAL) Take 1 tablet by mouth Daily.    [DISCONTINUED] ibuprofen (ADVIL,MOTRIN) 600 MG tablet 1 tablet daily as needed.     Family History       Family history is unknown by patient.          Tobacco Use    Smoking status: Current Every Day Smoker     Packs/day: 0.25     Years: 40.00     Pack years: 10.00     Types: Cigarettes    Smokeless tobacco: Never Used    Tobacco comment: patient states 3 cigs per day   Substance and Sexual Activity    Alcohol use: No     Alcohol/week: 1.7 standard drinks     Types: 2 Standard drinks or equivalent per week    Drug use: No    Sexual activity: Not Currently     Review of Systems   Constitutional:  Positive for fatigue.   HENT: Negative.     Eyes: Negative.    Respiratory:  Positive for cough and shortness of breath.    Cardiovascular: Negative.    Gastrointestinal: Negative.    Endocrine: Positive for polydipsia.   Genitourinary: Negative.    Musculoskeletal: Negative.    Skin: Negative.    Allergic/Immunologic: Negative.    Neurological: Negative.    Hematological: Negative.    All other systems reviewed and are negative.  Objective:     Vital Signs (Most Recent):  Temp: 97.9 °F (36.6 °C) (05/15/22 1720)  Pulse: 72 (05/15/22 2104)  Resp: (!) 24 (05/15/22 2104)  BP: 120/60 (05/15/22 2104)  SpO2: 96 % (05/15/22 2104)   Vital Signs (24h Range):  Temp:  [97.9 °F (36.6 °C)] 97.9 °F (36.6 °C)  Pulse:  [] 72  Resp:  [18-30] 24  SpO2:  [84 %-100 %] 96 %  BP: ()/(58-72) 120/60     Weight: 68 kg (150 lb)  Body mass index is 23.49 kg/m².    Physical Exam  Vitals and nursing note reviewed.   Constitutional:       Appearance: She is well-developed. She is ill-appearing.   HENT:       Head: Normocephalic and atraumatic.      Right Ear: External ear normal.      Left Ear: External ear normal.      Nose: Nose normal.   Eyes:      Conjunctiva/sclera: Conjunctivae normal.      Pupils: Pupils are equal, round, and reactive to light.   Cardiovascular:      Rate and Rhythm: Regular rhythm. Tachycardia present.      Heart sounds: Normal heart sounds.   Pulmonary:      Effort: Pulmonary effort is normal.      Breath sounds: Normal breath sounds.      Comments: Decreased entry bases with coarse wet large airway sounds and expiratory wheezes; no opening crepitations appreciated currently  Abdominal:      General: Bowel sounds are normal.      Palpations: Abdomen is soft.   Musculoskeletal:         General: Normal range of motion.      Cervical back: Normal range of motion and neck supple.      Comments: Bilateral anterior lower extremity pain to minimal palpation; no calf pain   Skin:     General: Skin is warm and dry.      Capillary Refill: Capillary refill takes less than 2 seconds.   Neurological:      Mental Status: She is alert and oriented to person, place, and time.   Psychiatric:         Behavior: Behavior normal.         Thought Content: Thought content normal.         Judgment: Judgment normal.         CRANIAL NERVES     CN III, IV, VI   Pupils are equal, round, and reactive to light.     Significant Labs: All pertinent labs within the past 24 hours have been reviewed.  CBC:   Recent Labs   Lab 05/15/22  1739   WBC 18.76*   HGB 13.8   HCT 43.4   *     CMP:   Recent Labs   Lab 05/15/22  1828      K 2.7*   CL 94*   CO2 30*   *   BUN 32*   CREATININE 1.2   CALCIUM 14.3*   PROT 7.3   ALBUMIN 2.3*   BILITOT 0.8   ALKPHOS 110   AST 22   ALT 18   ANIONGAP 15   EGFRNONAA 45.9*     Cardiac Markers:   Recent Labs   Lab 05/15/22  1739   BNP 45     Lactic Acid:   Recent Labs   Lab 05/15/22  1740   LACTATE 2.9*     Troponin:   Recent Labs   Lab 05/15/22  1828   TROPONINI 0.033  "      Significant Imaging: I have reviewed all pertinent imaging results/findings within the past 24 hours.    Assessment/Plan:     * Acute hypoxemic respiratory failure  Patient with Hypoxic Respiratory failure which is Acute.  she is not on home oxygen. Supplemental oxygen was provided and noted- Oxygen Concentration (%):  [] 55.   Signs/symptoms of respiratory failure include- tachypnea, increased work of breathing, respiratory distress, use of accessory muscles and wheezing. Contributing diagnoses includes - COPD Labs and images were reviewed. Patient Has recent ABG, which has been reviewed. Will treat underlying causes and adjust management of respiratory failure as follows- broad spectrum antibiotics, nebulized bronchodilators, iv steroids, Vapotherm    Hypercalcemia   ml/hr, Aredia 30 mg iv X 1 dose; Nephrology consultation      Pneumonia of right lower lobe due to infectious organism  Broad spectrum antibiotics,       Severe sepsis  This patient does have evidence of infective focus  My overall impression is sepsis. Vital signs were reviewed and noted in progress note.  Antibiotics given-   Antibiotics (From admission, onward)            Start     Stop Route Frequency Ordered    05/15/22 2115  vancomycin 500 mg in dextrose 5 % 100 mL IVPB (ready to mix system)        "Followed by" Linked Group Details    05/16 0914 IV ED 1 Time 05/15/22 1901    05/15/22 2100  vancomycin in dextrose 5 % 1 gram/250 mL IVPB 1,000 mg        "Followed by" Linked Group Details    05/16 0859 IV ED 1 Time 05/15/22 1901    05/15/22 1931  vancomycin - pharmacy to dose  (vancomycin IVPB)        "And" Linked Group Details    -- IV pharmacy to manage frequency 05/15/22 1832        Cultures were taken-   Microbiology Results (last 7 days)     Procedure Component Value Units Date/Time    Urine culture [[id number]] Collected: 05/15/22 1816    Order Status: No result Specimen: Urine Updated: 05/15/22 1833    Blood culture x two " cultures. Draw prior to antibiotics. [[id number]] Collected: 05/15/22 1740    Order Status: Sent Specimen: Blood from Peripheral, Antecubital, Right Updated: 05/15/22 1757    Blood culture x two cultures. Draw prior to antibiotics. [[id number]] Collected: 05/15/22 1745    Order Status: Sent Specimen: Blood from Peripheral, Forearm, Left Updated: 05/15/22 1755        Latest lactate reviewed, they are-  Recent Labs   Lab 05/15/22  1740   LACTATE 2.9*       Organ dysfunction indicated by Acute respiratory failure  Source- Pneumonia with probable underlying malignancy    Source control Achieved by- above        VTE Risk Mitigation (From admission, onward)    None             Baldomero Grigsby MD  Department of Hospital Medicine   [facility] - Emergency Dept

## 2022-05-16 NOTE — HOSPITAL COURSE
71-year-old lady with prior history of heavy smoking dependence, COPD was treated for pneumonia, UTI, hypercalcemia and hypoxemic respiratory failure due to pneumonia and COPD.  She improved remarkably with IV antibiotics, DuoNebs, steroids.  Hypercalcemia improved with IV hydration, pamidronate, couple doses of calcitonin.  It was extremely difficult to treat this patient due to her poor participation in any interventions, frequent refusals of lab draw and other interventions.  Today she appears back to her baseline, we arranged home oxygen on discharge.  Considering her heavy smoking history, infiltrates on chest imaging, hypercalcemia there is a strong concern of malignancy.  I spent considerable time explaining this findings to patient that it is of paramount importance that she should follow-up with her PCP who can arrange outpatient imaging in next 4 weeks to confirm resolution of this findings or else she might need bronchoscopy.  She was discharged in hemodynamically stable condition with prescription of Ceftin, tapering steroid.  During her hospital stay her blood pressure remained borderline low, I discontinued her antihypertensives and discharge.  Further management of hypertension as per PCP.     Instructions provided to follow up with primary care physician as outpatient. Patient verbalized understanding and is aware to contact primary care physician or return to ED if new or worsening symptoms.    Physical exam on the day of discharge:  General: Patient resting comfortably in no acute distress.  Lungs:  On supplemental oxygen, CTA. Good air entry.  CVS: Regular rate and rhythm. No murmurs. No pedal edema.  Abd: Soft. Nontender. Non-distended.      Vital signs reviewed.  Nursing notes reviewed.

## 2022-05-16 NOTE — HPI
"71 year old female with history of GERD, Asthma, life long nicotine addiction and has had 2/3 COVID vaccinations presented to ED complaining of 7-10 days of progressively worsening SOB, worsening cough with scant sputum, and unquenchable thirst ("I can't stop drinking ice water"). She denied any chest pain. No orthopnea, PND, nor pedal edema. She has "Bone pain" in her legs and lower back--7/10 constant aching worse with palpation and movement. When she presented she was hypoxic, eventually requiring Vapotherm to maintain good saturation. She was initially tachycardic and hypotensive, but responded well to the 30 ml/kg NS for sepsis.    In ED: labs reviewed: Leukocytosis with normal H/H; hypokalemia (treated in ED, and sliding scale started) with stage 3a/b renal dysfunction, non-fasting hyperglycemia, severe hypercalcemia and hypoalbuminemia (calculated Calcium = 15.98).; elevated lactate. Flu and COVID negative. U/A: dirty urine. Patient was pan-cultured. CXR reviewed: large RLL pneumonia (no history of N/V). EKG reviewed: sinus tach without acute segments. Discussed with Nephrology which was current preferred agent to use in addition to NS for her hypercalcemia. Started NS infusion 150 ml/hr after the 30 ml/kg had finished and Aredia 30 mg iv X 1 dose; broad spectrum antibiotics started with nebs and steroids  "

## 2022-05-16 NOTE — ASSESSMENT & PLAN NOTE
Alveolar consolidation of the right lower lobe with adjacent adenopathy.  Unclear if pneumonia was associated with malignancy versus aspiration.    Continue vancomycin  Continue Rocephin  Continue Flagyl

## 2022-05-16 NOTE — CARE UPDATE
05/15/22 2013   Patient Assessment/Suction   Level of Consciousness (AVPU) alert   Respiratory Effort Unlabored   Expansion/Accessory Muscles/Retractions expansion symmetric;no retractions;no use of accessory muscles   Rhythm/Pattern, Respiratory pattern regular;unlabored   PRE-TX-O2   O2 Device (Oxygen Therapy) Vapotherm   Flow (L/min) 35   Oxygen Concentration (%) 55   SpO2 (!) 94 %   Pulse 83   Resp (!) 24   BP (!) 138/58   Ready to Wean/Extubation Screen   FIO2<=50 (chart decimal) (!) 0.55   Respiratory Evaluation   $ Care Plan Tech Time 15 min   $ Eval/Re-eval Charges Re-evaluation   Evaluation For   (care plan)   Tried placing pt on 5 lpm nc. Pt did not nika, Sat decreased to 87%. Placed back on Vapotherm.

## 2022-05-16 NOTE — ASSESSMENT & PLAN NOTE
"This patient does have evidence of infective focus  My overall impression is sepsis. Vital signs were reviewed and noted in progress note.  Antibiotics given-   Antibiotics (From admission, onward)            Start     Stop Route Frequency Ordered    05/15/22 2115  vancomycin 500 mg in dextrose 5 % 100 mL IVPB (ready to mix system)        "Followed by" Linked Group Details    05/16 0914 IV ED 1 Time 05/15/22 1901    05/15/22 2100  vancomycin in dextrose 5 % 1 gram/250 mL IVPB 1,000 mg        "Followed by" Linked Group Details    05/16 0859 IV ED 1 Time 05/15/22 1901    05/15/22 1931  vancomycin - pharmacy to dose  (vancomycin IVPB)        "And" Linked Group Details    -- IV pharmacy to manage frequency 05/15/22 1832        Cultures were taken-   Microbiology Results (last 7 days)     Procedure Component Value Units Date/Time    Urine culture [[id number]] Collected: 05/15/22 1816    Order Status: No result Specimen: Urine Updated: 05/15/22 1833    Blood culture x two cultures. Draw prior to antibiotics. [[id number]] Collected: 05/15/22 1740    Order Status: Sent Specimen: Blood from Peripheral, Antecubital, Right Updated: 05/15/22 1757    Blood culture x two cultures. Draw prior to antibiotics. [[id number]] Collected: 05/15/22 1745    Order Status: Sent Specimen: Blood from Peripheral, Forearm, Left Updated: 05/15/22 1755        Latest lactate reviewed, they are-  Recent Labs   Lab 05/15/22  1740   LACTATE 2.9*       Organ dysfunction indicated by Acute respiratory failure  Source- Pneumonia with probable underlying malignancy    Source control Achieved by- above    "

## 2022-05-16 NOTE — PLAN OF CARE
[facility]  Initial Discharge Assessment       Primary Care Provider: Patrick Olson MD    Admission Diagnosis: Acute respiratory failure with hypoxia [J96.01]    Admission Date: 5/15/2022  Expected Discharge Date:     Discharge Barriers Identified: (P) None    Assessment completed at bedside,  Patient states she has advanced directives, cm encouraged her to provide a copy to the hospital. Patient intends to discharge home where she lives with her spouse, with no needs identified at this time.    Payor: MEDICARE / Plan: MEDICARE PART A & B / Product Type: Government /     Extended Emergency Contact Information  Primary Emergency Contact: Dami Vega  Address: [place] [address] [facility]  Home Phone: [phone]  Relation: Spouse    Discharge Plan A: (P) Home  Discharge Plan B: (P) Home with family      [id number] - [place], [place] - [address] AT [facility] & [address] [address]  Phone: [phone] Fax: [phone]      Initial Assessment (most recent)       Adult Discharge Assessment - 05/16/22 1623          Discharge Assessment    Assessment Type Discharge Planning Assessment (P)      Confirmed/corrected address, phone number and insurance Yes (P)      Confirmed Demographics Correct on Facesheet (P)      Source of Information patient (P)      When was your last doctors appointment? -- (P)    2 weeks ago    Communicated NIRMAL with patient/caregiver Date not available/Unable to determine (P)      Reason For Admission ARF (P)      Lives With spouse (P)      Facility Arrived From: home (P)      Do you expect to return to your current living situation? Yes (P)      Do you have help at home or someone to help you manage your care at home? Yes (P)      Who are your caregiver(s) and their phone number(s)? Dami [place] [phone] (P)      Prior to hospitilization cognitive status: Alert/Oriented (P)       Current cognitive status: Alert/Oriented (P)      Walking or Climbing Stairs Difficulty none (P)      Dressing/Bathing Difficulty none (P)      Equipment Currently Used at Home none (P)      Readmission within 30 days? No (P)      Patient currently being followed by outpatient case management? No (P)      Do you currently have service(s) that help you manage your care at home? No (P)      Do you take prescription medications? Yes (P)      Do you have prescription coverage? Yes (P)      Coverage bcbs (P)      Do you have any problems affording any of your prescribed medications? No (P)      Is the patient taking medications as prescribed? yes (P)      Who is going to help you get home at discharge? [facility] [phone] (P)      How do you get to doctors appointments? family or friend will provide;car, drives self (P)      Are you on dialysis? No (P)      Do you take coumadin? No (P)      Discharge Plan A Home (P)      Discharge Plan B Home with family (P)      DME Needed Upon Discharge  none (P)      Discharge Plan discussed with: Patient (P)      Discharge Barriers Identified None (P)         Relationship/Environment    Name(s) of Who Lives With Patient [facility] [phone] (P)

## 2022-05-16 NOTE — PROGRESS NOTES
Pharmacokinetic Initial Assessment: IV Vancomycin    Assessment/Plan:    Initiate intravenous vancomycin with loading dose of 1500 mg once followed by a maintenance dose of vancomycin 1250 mg IV every 24 hours  Desired empiric serum trough concentration is 10 to 15 mcg/mL  Draw vancomycin trough level 60 min prior to fourth dose on 05/18 at approximately 1900  Pharmacy will continue to follow and monitor vancomycin.      Please contact pharmacy at extension [phone] with any questions regarding this assessment.     Thank you for the consult,   Nathaniel Young       Patient brief summary:  Jia Vega is a 71 y.o. female initiated on antimicrobial therapy with IV Vancomycin for treatment of suspected lower respiratory infection    Drug Allergies:   Review of patient's allergies indicates:  No Known Allergies    Actual Body Weight:   68 kg    Renal Function:   Estimated Creatinine Clearance: 42.4 mL/min (based on SCr of 1.2 mg/dL).,     CBC (last 72 hours):  Recent Labs   Lab Result Units 05/15/22  1739   WBC K/uL 18.76*   Hemoglobin g/dL 13.8   Hematocrit % 43.4   Platelets K/uL 540*   Gran % % 84.3*   Lymph % % 9.9*   Mono % % 4.8   Eosinophil % % 0.1   Basophil % % 0.2   Differential Method  Automated       Metabolic Panel (last 72 hours):  Recent Labs   Lab Result Units 05/15/22  1816 05/15/22  1828   Sodium mmol/L  --  139   Potassium mmol/L  --  2.7*   Chloride mmol/L  --  94*   CO2 mmol/L  --  30*   Glucose mg/dL  --  148*   Glucose, UA  Negative  --    BUN mg/dL  --  32*   Creatinine mg/dL  --  1.2   Albumin g/dL  --  2.3*   Total Bilirubin mg/dL  --  0.8   Alkaline Phosphatase U/L  --  110   AST U/L  --  22   ALT U/L  --  18   Magnesium mg/dL  --  1.6   Phosphorus mg/dL  --  3.2       Drug levels (last 3 results):  No results for input(s): VANCOMYCINRA, VANCOMYCINPE, VANCOMYCINTR in the last 72 hours.    Microbiologic Results:  Microbiology Results (last 7 days)       Procedure Component Value Units Date/Time     Urine culture [[id number]] Collected: 05/15/22 1816    Order Status: No result Specimen: Urine Updated: 05/15/22 1833    Blood culture x two cultures. Draw prior to antibiotics. [[id number]] Collected: 05/15/22 1740    Order Status: Sent Specimen: Blood from Peripheral, Antecubital, Right Updated: 05/15/22 1757    Blood culture x two cultures. Draw prior to antibiotics. [[id number]] Collected: 05/15/22 1745    Order Status: Sent Specimen: Blood from Peripheral, Forearm, Left Updated: 05/15/22 1755

## 2022-05-16 NOTE — ASSESSMENT & PLAN NOTE
Patient with Hypoxic Respiratory failure which is Acute.  she is not on home oxygen. Supplemental oxygen was provided and noted- Oxygen Concentration (%):  [] 55.   Signs/symptoms of respiratory failure include- tachypnea, increased work of breathing, respiratory distress, use of accessory muscles and wheezing. Contributing diagnoses includes - COPD Labs and images were reviewed. Patient Has recent ABG, which has been reviewed. Will treat underlying causes and adjust management of respiratory failure as follows- broad spectrum antibiotics, nebulized bronchodilators, iv steroids, Vapotherm

## 2022-05-16 NOTE — CONSULTS
"Nephrology Consult Note        Patient Name: Jia Vega  MRN: [id number]    Patient Class: IP- Inpatient   Admission Date: 5/15/2022  Length of Stay: 1 days  Date of Service: 5/16/2022    Attending Physician: Huy Chun MD  Primary Care Provider: Patrick Olson MD    Reason for Consult: hypercalcemia/hyponatremia/uti/alkalosis/anemia/htn     SUBJECTIVE:     HPI: 71F with GERD, Asthma, life long nicotine addiction presented to ED complaining of 7-10 days of progressively worsening SOB, worsening cough with scant sputum, aunquenchable thirst ("I can't stop drinking ice water"). She denied any chest pain. No orthopnea, PND, nor pedal edema. She has "Bone pain" in her legs and lower back--7/10 constant aching worse with palpation and movement. When she presented she was hypoxic, eventually requiring Vapotherm to maintain good saturation. She was initially tachycardic and hypotensive, but responded well to the 30 ml/kg NS for sepsis.     In ED: labs reviewed: Leukocytosis with normal H/H; hypokalemia (treated in ED, and sliding scale started) with stage 3a/b renal dysfunction, non-fasting hyperglycemia, severe hypercalcemia and hypoalbuminemia (calculated Calcium = 15.98).; elevated lactate. Flu and COVID negative. U/A: dirty urine. Patient was pan-cultured. CXR reviewed: large RLL pneumonia (no history of N/V). EKG reviewed: sinus tach without acute segments. Started NS infusion 150 ml/hr after the 30 ml/kg had finished and Aredia 30 mg iv X 1 dose; broad spectrum antibiotics started with nebs and steroids.     Past Medical History:   Diagnosis Date    Arthritis     Asthma     Full dentures     GERD (gastroesophageal reflux disease)     Hypertension     Seasonal allergies     Wears glasses      Past Surgical History:   Procedure Laterality Date    BACK SURGERY      cubital tunnel release  left    HAND SURGERY      right and left: ganglion cyst    HYSTERECTOMY  BSO    NECK SURGERY       Family " History   Family history unknown: Yes     Social History     Tobacco Use    Smoking status: Current Every Day Smoker     Packs/day: 0.25     Years: 40.00     Pack years: 10.00     Types: Cigarettes    Smokeless tobacco: Never Used    Tobacco comment: patient states 3 cigs per day   Substance Use Topics    Alcohol use: No     Alcohol/week: 1.7 standard drinks     Types: 2 Standard drinks or equivalent per week    Drug use: No       Review of patient's allergies indicates:  No Known Allergies    Outpatient meds:  No current facility-administered medications on file prior to encounter.     Current Outpatient Medications on File Prior to Encounter   Medication Sig Dispense Refill    albuterol (PROVENTIL/VENTOLIN HFA) 90 mcg/actuation inhaler INHALE 1 PUFF BY MOUTH EVERY 4 TO 6 HOURS AS NEEDED FOR WHEEZING OR SHORTNESS OF BREATH (Patient taking differently: Inhale 1 puff into the lungs every 4 to 6 hours as needed. INHALE 1 PUFF BY MOUTH EVERY 4 TO 6 HOURS AS NEEDED FOR WHEEZING OR SHORTNESS OF BREATH) 8.5 g 2    amlodipine-olmesartan (ALICE) 5-20 mg per tablet TAKE 1 TABLET BY MOUTH DAILY (Patient taking differently: Take 1 tablet by mouth once daily.) 90 tablet 1    aspirin (ECOTRIN) 81 MG EC tablet Take 1 tablet (81 mg total) by mouth Daily. 100 tablet 4    guaiFENesin (MUCINEX) 600 mg 12 hr tablet Take 2 tablets (1,200 mg total) by mouth 2 (two) times daily. 20 tablet 0    multivitamin capsule Take 1 capsule by mouth once daily.      omeprazole (PRILOSEC) 40 MG capsule Take 1 capsule (40 mg total) by mouth once daily. 30 capsule 5    oxybutynin (DITROPAN) 5 MG Tab TAKE 1 TABLET BY MOUTH TWICE DAILY( WATCH FOR DRY MOUTH AND CONSTIPATION) (Patient taking differently: Take 5 mg by mouth 2 (two) times daily. TAKE 1 TABLET BY MOUTH TWICE DAILY( WATCH FOR DRY MOUTH AND CONSTIPATION)) 60 tablet 5    pravastatin (PRAVACHOL) 80 MG tablet Take 1 tablet (80 mg total) by mouth every evening. 90 tablet 0     promethazine-dextromethorphan (PROMETHAZINE-DM) 6.25-15 mg/5 mL Syrp Take 5 mLs by mouth every 8 (eight) hours as needed (As needed for cough and congestion). Do not drive on this medication because of drowsiness. 120 mL 0    sertraline (ZOLOFT) 50 MG tablet TAKE 1 TABLET(50 MG) BY MOUTH EVERY NIGHT (Patient taking differently: Take 50 mg by mouth every evening.) 90 tablet 0    VITAMIN B COMPLEX (B COMPLEX 1 ORAL) Take 1 tablet by mouth Daily.         Scheduled meds:   albuterol-ipratropium  3 mL Nebulization Q6H WAKE    aspirin  81 mg Oral Daily    cefTRIAXone (ROCEPHIN) IVPB  2 g Intravenous Q24H    chlorhexidine  15 mL Mouth/Throat BID    enoxaparin  40 mg Subcutaneous Daily    guaiFENesin  600 mg Oral BID    methylPREDNISolone sodium succinate injection  60 mg Intravenous Q6H    metronidazole  500 mg Intravenous Q8H    multivitamin  1 tablet Oral Daily    mupirocin   Nasal BID    oxybutynin  5 mg Oral BID    pantoprazole  40 mg Oral Before breakfast    pravastatin  80 mg Oral QHS    sertraline  50 mg Oral QHS    vancomycin (VANCOCIN) IVPB  1,250 mg Intravenous Q24H       Infusions:   sodium chloride 0.9% 150 mL/hr at 05/16/22 1042       PRN meds:  acetaminophen, calcium chloride IVPB, calcium chloride IVPB, calcium chloride IVPB, dextrose 10%, dextrose 10%, insulin aspart U-100, magnesium oxide, magnesium sulfate IVPB, magnesium sulfate IVPB, magnesium sulfate IVPB, magnesium sulfate IVPB, melatonin, ondansetron, potassium chloride, potassium chloride, potassium chloride, potassium chloride, Pharmacy to dose Vancomycin consult **AND** vancomycin - pharmacy to dose    Review of Systems:  Review of Systems   Constitutional: Negative for chills, fever, malaise/fatigue and weight loss.   HENT: Negative for hearing loss and nosebleeds.    Eyes: Negative for blurred vision, double vision and photophobia.   Respiratory: Negative for cough, shortness of breath and wheezing.    Cardiovascular: Negative  for chest pain, palpitations and leg swelling.   Gastrointestinal: Negative for abdominal pain, constipation, diarrhea, heartburn, nausea and vomiting.   Genitourinary: Negative for dysuria, frequency and urgency.   Musculoskeletal: Negative for falls, joint pain and myalgias.   Skin: Negative for itching and rash.   Neurological: Negative for dizziness, speech change, focal weakness, loss of consciousness and headaches.   Endo/Heme/Allergies: Does not bruise/bleed easily.   Psychiatric/Behavioral: Negative for depression and substance abuse. The patient is not nervous/anxious.        OBJECTIVE:     Vital Signs and IO (Last 24H):  Temp:  [97.6 °F (36.4 °C)-97.9 °F (36.6 °C)]   Pulse:  []   Resp:  [15-37]   BP: ()/()   SpO2:  [84 %-100 %]   I/O last 3 completed shifts:  In: 100 [IV Piggyback:100]  Out: -     Wt Readings from Last 5 Encounters:   05/15/22 78.7 kg (173 lb 8 oz)   04/28/22 83.5 kg (184 lb)   05/25/21 93.9 kg (207 lb 0.2 oz)   05/07/21 93.9 kg (207 lb)   10/01/19 90.3 kg (199 lb)     Physical Exam:  Physical Exam  Constitutional:       Appearance: She is well-developed. She is not diaphoretic.   HENT:      Head: Normocephalic and atraumatic.   Eyes:      General: No scleral icterus.     Pupils: Pupils are equal, round, and reactive to light.   Cardiovascular:      Rate and Rhythm: Normal rate and regular rhythm.   Pulmonary:      Effort: Pulmonary effort is normal. No respiratory distress.      Breath sounds: No stridor.   Abdominal:      General: There is no distension.      Palpations: Abdomen is soft.   Musculoskeletal:         General: No deformity. Normal range of motion.      Cervical back: Neck supple.   Skin:     General: Skin is warm and dry.      Findings: No erythema or rash.   Neurological:      Mental Status: She is alert and oriented to person, place, and time.      Cranial Nerves: No cranial nerve deficit.   Psychiatric:         Behavior: Behavior normal.         Body  mass index is 27.17 kg/m².    Laboratory:  Recent Labs   Lab 05/15/22  1828 05/16/22  0421    138   K 2.7* 3.1*   CL 94* 96   CO2 30* 32*   BUN 32* 30*   CREATININE 1.2 1.1   ESTGFRAFRICA 52.9* 58.8*   EGFRNONAA 45.9* 51.0*   * 158*       Recent Labs   Lab 05/15/22  1828 05/16/22  0421   CALCIUM 14.3* 13.0*   ALBUMIN 2.3* 2.0*   PHOS 3.2  --    MG 1.6 1.5*       Recent Labs   Lab 05/15/22  1739 05/16/22  0421   WBC 18.76* 12.67   HGB 13.8 11.9*   HCT 43.4 36.2*   * 373   MCV 81* 78*   MCHC 31.8* 32.9   MONO 4.8  0.9 1.0*  0.1*       Recent Labs   Lab 05/15/22  1828 05/16/22  0421   BILITOT 0.8 0.8   PROT 7.3 6.5   ALBUMIN 2.3* 2.0*   ALKPHOS 110 90   ALT 18 18   AST 22 19       Recent Labs   Lab 05/15/22  1816   Color, UA Yellow   Appearance, UA Cloudy A   pH, UA 6.0   Specific Gravity, UA 1.025   Protein, UA 2+ A   Glucose, UA Negative   Ketones, UA Negative   Urobilinogen, UA 1.0   Bilirubin (UA) 1+ A   Occult Blood UA 3+ A   Nitrite, UA Negative   RBC, UA 9 H   WBC, UA >100 H   Bacteria Occasional   Hyaline Casts, UA 2370 A       Recent Labs   Lab 05/15/22  1743   POC PH 7.503 H   POC PCO2 46.0 H   POC HCO3 36.1 H   POC PO2 48   POC SATURATED O2 86 L   POC BE 13   Sample VENOUS       Microbiology Results (last 7 days)     Procedure Component Value Units Date/Time    Urine culture [[id number]]  (Abnormal) Collected: 05/15/22 1816    Order Status: Completed Specimen: Urine Updated: 05/16/22 0700     Urine Culture, Routine GRAM NEGATIVE VIRAL, LACTOSE   >100,000 cfu/ml  Identification and susceptibility pending      Narrative:      Specimen Source->Urine    Blood culture x two cultures. Draw prior to antibiotics. [[id number]] Collected: 05/15/22 3668    Order Status: Completed Specimen: Blood from Peripheral, Antecubital, Right Updated: 05/16/22 0117     Blood Culture, Routine No Growth to date    Narrative:      Aerobic and anaerobic    Blood culture x two cultures. Draw prior to  antibiotics. [[id number]] Collected: 05/15/22 6435    Order Status: Completed Specimen: Blood from Peripheral, Forearm, Left Updated: 05/15/22 3585     Blood Culture, Routine No Growth to date    Narrative:      Aerobic and anaerobic        ASSESSMENT/PLAN:     Hypercalcemia  Alkalosis  Hypokalemia  UTI and/or PNA  No NSAIDs, ACEI/ARB, IV contrast or other nephrotoxins.  Keep MAP > 60, SBP > 100.  Treat UTI.  Check PTH, look for malignancy.  Agree with pamidronate and IVF.  Calcitonin BID for 2 doses, consider again in AM.  Keep in ICU for now.    Anemia  Hgb and HCT are acceptable. Monitor.    HTN, now hypotension  BP seem controlled.   Tolerate asymptomatic HTN up to -160.  HOLD home meds.  Low sodium diet.    Thank you for allowing us to participate in the care of your patient!   We will follow the patient and provide recommendations as needed.    Patient care time was spent personally by me on the following activities:   · Obtaining a history.  · Examination of patient.  · Providing medical care at the patients bedside.  · Developing a treatment plan with patient or surrogate and bedside caregivers.  · Ordering and reviewing laboratory studies, radiographic studies, pulse oximetry.  · Ordering and performing treatments and interventions.  · Evaluation of patient's response to treatment.  · Discussions with consultants while on the unit and immediately available to the patient.  · Re-evaluation of the patient's condition.  · Documentation in the medical record.     Damian Don MD    [facility]  [address] [place] [address]  [place] [address]    [phone] - tel  [phone] - fax    5/16/2022

## 2022-05-16 NOTE — ASSESSMENT & PLAN NOTE
Patient found have severe hypercalcemia.  PTH 4.7 (suppressed, highly suggestive of underlying malignancy).  Patient has never had a colonoscopy.  She has mediastinal and hilar adenopathy and CT of the chest.    Patient received Aredia 30 mg IV x1  calcitonin b.i.d. x2  IV fluids at 150 cc/hour  Nephrology consulted for further input.

## 2022-05-16 NOTE — CARE UPDATE
05/15/22 1846   Patient Assessment/Suction   Level of Consciousness (AVPU) alert   Respiratory Effort Unlabored;Shallow   Expansion/Accessory Muscles/Retractions expansion symmetric;no retractions;no use of accessory muscles   All Lung Fields Breath Sounds diminished;crackles, fine   Rhythm/Pattern, Respiratory shallow   Cough Type dry;good;nonproductive   PRE-TX-O2   O2 Device (Oxygen Therapy) Vapotherm   $ Is the patient on High Flow Oxygen? Yes   $ Vapotherm Daily Charge Vapotherm Daily   $ Vapotherm Equipment Vapotherm Circuit   Humidification temp set 34   Flow (L/min) 40   Oxygen Concentration (%) 95   SpO2 97 %   Pulse Oximetry Type Continuous   $ Pulse Oximetry - Multiple Charge Pulse Oximetry - Multiple   Pulse 91   Resp 18   Aerosol Therapy   $ Aerosol Therapy Charges Initial Continuous   Daily Review of Necessity (SVN) completed   Respiratory Treatment Status (SVN) given   Treatment Route (SVN) oxygen;mask   Patient Position (SVN) semi-Foster's   Post Treatment Assessment (SVN) increased aeration;patient reports breathing improved   Signs of Intolerance (SVN) none   Breath Sounds Post-Respiratory Treatment   Throughout All Fields Post-Treatment All Fields   Throughout All Fields Post-Treatment aeration increased;clear   Post-treatment Heart Rate (beats/min) 81   Post-treatment Resp Rate (breaths/min) 18   Equipment Change   $ RT Equipment Continuous nebulizer   Respiratory Interventions   Cough And Deep Breathing done with encouragement   Breathing Techniques/Airway Clearance deep/controlled cough encouraged;diaphragmatic breathing promoted   Ready to Wean/Extubation Screen   FIO2<=50 (chart decimal) (!) 0.95   Education   $ Education Bronchodilator;Other (see comment);15 min  (vapotherm, o2 weaning, deep diaphragmatic breathing exercises)   Respiratory Evaluation   $ Care Plan Tech Time 15 min   $ Eval/Re-eval Charges Evaluation   Evaluation For New Orders

## 2022-05-16 NOTE — ASSESSMENT & PLAN NOTE
Patient with Hypoxic Respiratory failure which is Acute.  she is not on home oxygen. Supplemental oxygen was provided and noted- Oxygen Concentration (%):  [] 50.   Signs/symptoms of respiratory failure include- tachypnea, increased work of breathing, respiratory distress, use of accessory muscles and wheezing. Contributing diagnoses includes - COPD Labs and images were reviewed. Patient Has recent ABG, which has been reviewed. Will treat underlying causes and adjust management of respiratory failure as follows-    Continue IV antibiotics  Continue IV steroids  Wean oxygen as tolerated

## 2022-05-16 NOTE — SUBJECTIVE & OBJECTIVE
Past Medical History:   Diagnosis Date    Arthritis     Asthma     Full dentures     GERD (gastroesophageal reflux disease)     Hypertension     Seasonal allergies     Wears glasses        Past Surgical History:   Procedure Laterality Date    BACK SURGERY      cubital tunnel release  left    HAND SURGERY      right and left: ganglion cyst    HYSTERECTOMY  BSO    NECK SURGERY         Review of patient's allergies indicates:  No Known Allergies    No current facility-administered medications on file prior to encounter.     Current Outpatient Medications on File Prior to Encounter   Medication Sig    albuterol (PROVENTIL/VENTOLIN HFA) 90 mcg/actuation inhaler INHALE 1 PUFF BY MOUTH EVERY 4 TO 6 HOURS AS NEEDED FOR WHEEZING OR SHORTNESS OF BREATH (Patient taking differently: Inhale 1 puff into the lungs every 4 to 6 hours as needed. INHALE 1 PUFF BY MOUTH EVERY 4 TO 6 HOURS AS NEEDED FOR WHEEZING OR SHORTNESS OF BREATH)    amlodipine-olmesartan (ALICE) 5-20 mg per tablet TAKE 1 TABLET BY MOUTH DAILY (Patient taking differently: Take 1 tablet by mouth once daily.)    aspirin (ECOTRIN) 81 MG EC tablet Take 1 tablet (81 mg total) by mouth Daily.    guaiFENesin (MUCINEX) 600 mg 12 hr tablet Take 2 tablets (1,200 mg total) by mouth 2 (two) times daily.    multivitamin capsule Take 1 capsule by mouth once daily.    omeprazole (PRILOSEC) 40 MG capsule Take 1 capsule (40 mg total) by mouth once daily.    oxybutynin (DITROPAN) 5 MG Tab TAKE 1 TABLET BY MOUTH TWICE DAILY( WATCH FOR DRY MOUTH AND CONSTIPATION) (Patient taking differently: Take 5 mg by mouth 2 (two) times daily. TAKE 1 TABLET BY MOUTH TWICE DAILY( WATCH FOR DRY MOUTH AND CONSTIPATION))    pravastatin (PRAVACHOL) 80 MG tablet Take 1 tablet (80 mg total) by mouth every evening.    promethazine-dextromethorphan (PROMETHAZINE-DM) 6.25-15 mg/5 mL Syrp Take 5 mLs by mouth every 8 (eight) hours as needed (As needed for cough and congestion). Do not drive on this medication  because of drowsiness.    sertraline (ZOLOFT) 50 MG tablet TAKE 1 TABLET(50 MG) BY MOUTH EVERY NIGHT (Patient taking differently: Take 50 mg by mouth every evening.)    VITAMIN B COMPLEX (B COMPLEX 1 ORAL) Take 1 tablet by mouth Daily.    [DISCONTINUED] ibuprofen (ADVIL,MOTRIN) 600 MG tablet 1 tablet daily as needed.     Family History       Family history is unknown by patient.          Tobacco Use    Smoking status: Current Every Day Smoker     Packs/day: 0.25     Years: 40.00     Pack years: 10.00     Types: Cigarettes    Smokeless tobacco: Never Used    Tobacco comment: patient states 3 cigs per day   Substance and Sexual Activity    Alcohol use: No     Alcohol/week: 1.7 standard drinks     Types: 2 Standard drinks or equivalent per week    Drug use: No    Sexual activity: Not Currently     Review of Systems   Constitutional:  Positive for fatigue.   HENT: Negative.     Eyes: Negative.    Respiratory:  Positive for cough and shortness of breath.    Cardiovascular: Negative.    Gastrointestinal: Negative.    Endocrine: Positive for polydipsia.   Genitourinary: Negative.    Musculoskeletal: Negative.    Skin: Negative.    Allergic/Immunologic: Negative.    Neurological: Negative.    Hematological: Negative.    All other systems reviewed and are negative.  Objective:     Vital Signs (Most Recent):  Temp: 97.9 °F (36.6 °C) (05/15/22 1720)  Pulse: 72 (05/15/22 2104)  Resp: (!) 24 (05/15/22 2104)  BP: 120/60 (05/15/22 2104)  SpO2: 96 % (05/15/22 2104)   Vital Signs (24h Range):  Temp:  [97.9 °F (36.6 °C)] 97.9 °F (36.6 °C)  Pulse:  [] 72  Resp:  [18-30] 24  SpO2:  [84 %-100 %] 96 %  BP: ()/(58-72) 120/60     Weight: 68 kg (150 lb)  Body mass index is 23.49 kg/m².    Physical Exam  Vitals and nursing note reviewed.   Constitutional:       Appearance: She is well-developed. She is ill-appearing.   HENT:      Head: Normocephalic and atraumatic.      Right Ear: External ear normal.      Left Ear: External ear  normal.      Nose: Nose normal.   Eyes:      Conjunctiva/sclera: Conjunctivae normal.      Pupils: Pupils are equal, round, and reactive to light.   Cardiovascular:      Rate and Rhythm: Regular rhythm. Tachycardia present.      Heart sounds: Normal heart sounds.   Pulmonary:      Effort: Pulmonary effort is normal.      Breath sounds: Normal breath sounds.      Comments: Decreased entry bases with coarse wet large airway sounds and expiratory wheezes; no opening crepitations appreciated currently  Abdominal:      General: Bowel sounds are normal.      Palpations: Abdomen is soft.   Musculoskeletal:         General: Normal range of motion.      Cervical back: Normal range of motion and neck supple.      Comments: Bilateral anterior lower extremity pain to minimal palpation; no calf pain   Skin:     General: Skin is warm and dry.      Capillary Refill: Capillary refill takes less than 2 seconds.   Neurological:      Mental Status: She is alert and oriented to person, place, and time.   Psychiatric:         Behavior: Behavior normal.         Thought Content: Thought content normal.         Judgment: Judgment normal.         CRANIAL NERVES     CN III, IV, VI   Pupils are equal, round, and reactive to light.     Significant Labs: All pertinent labs within the past 24 hours have been reviewed.  CBC:   Recent Labs   Lab 05/15/22  1739   WBC 18.76*   HGB 13.8   HCT 43.4   *     CMP:   Recent Labs   Lab 05/15/22  1828      K 2.7*   CL 94*   CO2 30*   *   BUN 32*   CREATININE 1.2   CALCIUM 14.3*   PROT 7.3   ALBUMIN 2.3*   BILITOT 0.8   ALKPHOS 110   AST 22   ALT 18   ANIONGAP 15   EGFRNONAA 45.9*     Cardiac Markers:   Recent Labs   Lab 05/15/22  1739   BNP 45     Lactic Acid:   Recent Labs   Lab 05/15/22  1740   LACTATE 2.9*     Troponin:   Recent Labs   Lab 05/15/22  1828   TROPONINI 0.033       Significant Imaging: I have reviewed all pertinent imaging results/findings within the past 24 hours.

## 2022-05-16 NOTE — PROGRESS NOTES
"[facility] Medicine  Progress Note    Patient Name: Jia Vega  MRN: [id number]  Patient Class: IP- Inpatient   Admission Date: 5/15/2022  Length of Stay: 1 days  Attending Physician: Huy Chun MD  Primary Care Provider: Patrick Olson MD        Subjective:     Principal Problem:Acute hypoxemic respiratory failure        HPI:  71 year old female with history of GERD, Asthma, life long nicotine addiction and has had 2/3 COVID vaccinations presented to ED complaining of 7-10 days of progressively worsening SOB, worsening cough with scant sputum, and unquenchable thirst ("I can't stop drinking ice water"). She denied any chest pain. No orthopnea, PND, nor pedal edema. She has "Bone pain" in her legs and lower back--7/10 constant aching worse with palpation and movement. When she presented she was hypoxic, eventually requiring Vapotherm to maintain good saturation. She was initially tachycardic and hypotensive, but responded well to the 30 ml/kg NS for sepsis.    In ED: labs reviewed: Leukocytosis with normal H/H; hypokalemia (treated in ED, and sliding scale started) with stage 3a/b renal dysfunction, non-fasting hyperglycemia, severe hypercalcemia and hypoalbuminemia (calculated Calcium = 15.98).; elevated lactate. Flu and COVID negative. U/A: dirty urine. Patient was pan-cultured. CXR reviewed: large RLL pneumonia (no history of N/V). EKG reviewed: sinus tach without acute segments. Discussed with Nephrology which was current preferred agent to use in addition to NS for her hypercalcemia. Started NS infusion 150 ml/hr after the 30 ml/kg had finished and Aredia 30 mg iv X 1 dose; broad spectrum antibiotics started with nebs and steroids      Overview/Hospital Course:  Patient was admitted for severe hypercalcemia.  She received Aredia, IV fluids, calcitonin.  Found to have right lower lobe pneumonia with significant adjacent adenopathy.  Critical care orders including IV fluids, " consult orders, laboratory draws and broad-spectrum antibiotics were not released after transfer to the ICU.      Interval History:  Patient is aloof and seemed disinterested with her medical care despite her high level of acuity.  She generally avoids doctors.  She has never had a colonoscopy.  She has an extensive smoking history.    According to the  at bedside, the patient has progressively worsening cough with associated weight loss for the last 4 weeks.      Review of Systems   Constitutional:  Positive for unexpected weight change. Negative for chills and fever.   Respiratory:  Positive for shortness of breath.    Cardiovascular:  Negative for chest pain.   Gastrointestinal:  Negative for abdominal pain.   Endocrine: Positive for polyuria.   Genitourinary:  Negative for dysuria.   Skin:  Negative for rash.   Objective:     Vital Signs (Most Recent):  Temp: 97.6 °F (36.4 °C) (05/16/22 0745)  Pulse: 73 (05/16/22 1600)  Resp: (!) 27 (05/16/22 1600)  BP: (!) 151/67 (05/16/22 1500)  SpO2: (!) 90 % (05/16/22 1600)   Vital Signs (24h Range):  Temp:  [97.6 °F (36.4 °C)-97.9 °F (36.6 °C)] 97.6 °F (36.4 °C)  Pulse:  [] 73  Resp:  [15-37] 27  SpO2:  [84 %-100 %] 90 %  BP: ()/(50-74) 151/67     Weight: 78.7 kg (173 lb 8 oz)  Body mass index is 27.17 kg/m².    Intake/Output Summary (Last 24 hours) at 5/16/2022 1653  Last data filed at 5/16/2022 1215  Gross per 24 hour   Intake 1060 ml   Output --   Net 1060 ml      Physical Exam  Vitals reviewed.   Cardiovascular:      Rate and Rhythm: Normal rate and regular rhythm.   Pulmonary:      Comments: High-flow nasal cannula in place.  Right lower lung fields with crackles.  Abdominal:      General: Bowel sounds are normal.      Tenderness: There is no abdominal tenderness.   Musculoskeletal:      Cervical back: Neck supple.   Skin:     General: Skin is warm.      Capillary Refill: Capillary refill takes less than 2 seconds.   Neurological:      General: No  focal deficit present.      Comments: Following commands.  Moving all 4 extremities.  Conversant.       Significant Labs: All pertinent labs within the past 24 hours have been reviewed.  CBC:   Recent Labs   Lab 05/15/22  1739 05/16/22  0421   WBC 18.76* 12.67   HGB 13.8 11.9*   HCT 43.4 36.2*   * 373     CMP:   Recent Labs   Lab 05/15/22  1828 05/16/22  0421 05/16/22  1204    138 137   K 2.7* 3.1* 3.4*   CL 94* 96 94*   CO2 30* 32* 29   * 158* 149*   BUN 32* 30* 31*   CREATININE 1.2 1.1 1.1   CALCIUM 14.3* 13.0* 13.1*   PROT 7.3 6.5 6.8   ALBUMIN 2.3* 2.0* 2.2*   BILITOT 0.8 0.8 0.8   ALKPHOS 110 90 92   AST 22 19 20   ALT 18 18 18   ANIONGAP 15 10 14   EGFRNONAA 45.9* 51.0* 51.0*       Significant Imaging: I have reviewed all pertinent imaging results/findings within the past 24 hours.      Assessment/Plan:      * Acute hypoxemic respiratory failure  Patient with Hypoxic Respiratory failure which is Acute.  she is not on home oxygen. Supplemental oxygen was provided and noted- Oxygen Concentration (%):  [] 50.   Signs/symptoms of respiratory failure include- tachypnea, increased work of breathing, respiratory distress, use of accessory muscles and wheezing. Contributing diagnoses includes - COPD Labs and images were reviewed. Patient Has recent ABG, which has been reviewed. Will treat underlying causes and adjust management of respiratory failure as follows-    Continue IV antibiotics  Continue IV steroids  Wean oxygen as tolerated      Pyuria  Follow urine culture  IV ceftriaxone as above      Hypercalcemia  Patient found have severe hypercalcemia.  PTH 4.7 (suppressed, highly suggestive of underlying malignancy).  Patient has never had a colonoscopy.  She has mediastinal and hilar adenopathy and CT of the chest.    Patient received Aredia 30 mg IV x1  calcitonin b.i.d. x2  IV fluids at 150 cc/hour  Nephrology consulted for further input.      Pneumonia of right lower lobe due to  "infectious organism  Alveolar consolidation of the right lower lobe with adjacent adenopathy.  Unclear if pneumonia was associated with malignancy versus aspiration.    Continue vancomycin  Continue Rocephin  Continue Flagyl      Severe sepsis  This patient does have evidence of infective focus  My overall impression is sepsis. Vital signs were reviewed and noted in progress note.  Antibiotics given-   Antibiotics (From admission, onward)            Start     Stop Route Frequency Ordered    05/15/22 2115  vancomycin 500 mg in dextrose 5 % 100 mL IVPB (ready to mix system)        "Followed by" Linked Group Details    05/16 0914 IV ED 1 Time 05/15/22 1901    05/15/22 2100  vancomycin in dextrose 5 % 1 gram/250 mL IVPB 1,000 mg        "Followed by" Linked Group Details    05/16 0859 IV ED 1 Time 05/15/22 1901    05/15/22 1931  vancomycin - pharmacy to dose  (vancomycin IVPB)        "And" Linked Group Details    -- IV pharmacy to manage frequency 05/15/22 1832        Cultures were taken-   Microbiology Results (last 7 days)     Procedure Component Value Units Date/Time    Urine culture [[id number]] Collected: 05/15/22 1816    Order Status: No result Specimen: Urine Updated: 05/15/22 1833    Blood culture x two cultures. Draw prior to antibiotics. [[id number]] Collected: 05/15/22 1740    Order Status: Sent Specimen: Blood from Peripheral, Antecubital, Right Updated: 05/15/22 1757    Blood culture x two cultures. Draw prior to antibiotics. [[id number]] Collected: 05/15/22 1745    Order Status: Sent Specimen: Blood from Peripheral, Forearm, Left Updated: 05/15/22 1755        Latest lactate reviewed, they are-  Recent Labs   Lab 05/15/22  1740   LACTATE 2.9*       Organ dysfunction indicated by Acute respiratory failure  Source- Pneumonia with probable underlying malignancy    Source control Achieved by- above      Hypercholesterolemia  Continue pravastatin      Chronic obstructive pulmonary disease  Not on inhalers at home   " Wean steroids  DuoNebs q.6        VTE Risk Mitigation (From admission, onward)         Ordered     enoxaparin injection 40 mg  Daily         05/16/22 0931     IP VTE HIGH RISK PATIENT  Once         05/16/22 0931     Place sequential compression device  Until discontinued         05/16/22 0931                Discharge Planning   NIRMAL:      Code Status: Full Code   Is the patient medically ready for discharge?:     Reason for patient still in hospital (select all that apply): Treatment and Consult recommendations  Discharge Plan A: Home                  Huy Chun MD  Department of Hospital Medicine   [facility]

## 2022-05-16 NOTE — CONSULTS
Pulmonary/Critical Care Consult      PATIENT NAME: Jia Vega  MRN: [id number]  TODAY'S DATE: 2022  10:26 AM  ADMIT DATE: 5/15/2022  AGE: 71 y.o. : 1950    CONSULT REQUESTED BY: Huy Chun MD    REASON FOR CONSULT:   Pneumonia and hypercalcemia    HPI:  The patient is a 71-year-old female who presented to the emergency room with a week of progressive shortness of breath and cough.  She was also complaining of an un quintuple thirst.  She is complaining of bone pain in her legs and lower back.  She was hypotensive and tachycardic on presentation but responded well to a fluid bolus.  She has been given a dose of pamidronate.     The patient is very confused about how long she has been sick.  She tells me she has lost 50 lb but cannot tell me in what time frame.  She tells me she quit smoking but again cannot quantify how long ago that was.  She says she just got tired of smoking.    REVIEW OF SYSTEMS  GENERAL: Feeling confused.  EYES: Vision is good.  ENT: No sinusitis or pharyngitis.   HEART: No chest pain or palpitations.  LUNGS:  She has shortness of breath and cough.  GI: No Nausea, vomiting, constipation, diarrhea, or reflux.  : No dysuria, hesitancy, or nocturia.  SKIN: No lesions or rashes.  MUSCULOSKELETAL: No joint pain or myalgias.  NEURO: No headaches or neuropathy.  LYMPH: No edema or adenopathy.  PSYCH: No anxiety or depression.  ENDO: No weight change.    ALLERGIES  Review of patient's allergies indicates:  No Known Allergies    INPATIENT SCHEDULED MEDICATIONS   albuterol-ipratropium  3 mL Nebulization Q6H WAKE    aspirin  81 mg Oral Daily    cefTRIAXone (ROCEPHIN) IVPB  2 g Intravenous Q24H    chlorhexidine  15 mL Mouth/Throat BID    enoxaparin  40 mg Subcutaneous Daily    guaiFENesin  600 mg Oral BID    methylPREDNISolone sodium succinate injection  60 mg Intravenous Q6H    metronidazole  500 mg Intravenous Q8H    multivitamin  1 tablet Oral Daily    mupirocin    Nasal BID    oxybutynin  5 mg Oral BID    pantoprazole  40 mg Oral Before breakfast    pravastatin  80 mg Oral QHS    sertraline  50 mg Oral QHS    vancomycin (VANCOCIN) IVPB  1,250 mg Intravenous Q24H      sodium chloride 0.9%         MEDICAL AND SURGICAL HISTORY  Past Medical History:   Diagnosis Date    Arthritis     Asthma     Full dentures     GERD (gastroesophageal reflux disease)     Hypertension     Seasonal allergies     Wears glasses      Past Surgical History:   Procedure Laterality Date    BACK SURGERY      cubital tunnel release  left    HAND SURGERY      right and left: ganglion cyst    HYSTERECTOMY  BSO    NECK SURGERY         ALCOHOL, TOBACCO AND DRUG USE  Social History     Tobacco Use   Smoking Status Current Every Day Smoker    Packs/day: 0.25    Years: 40.00    Pack years: 10.00    Types: Cigarettes   Smokeless Tobacco Never Used   Tobacco Comment    patient states 3 cigs per day   The patient tells me she quit smoking weeks ago.  Social History     Substance and Sexual Activity   Alcohol Use No    Alcohol/week: 1.7 standard drinks    Types: 2 Standard drinks or equivalent per week     Social History     Substance and Sexual Activity   Drug Use No       FAMILY HISTORY  Family History   Family history unknown: Yes       VITAL SIGNS (MOST RECENT)  Temp: 97.6 °F (36.4 °C) (05/16/22 0745)  Pulse: 66 (05/16/22 0800)  Resp: (!) 26 (05/16/22 0800)  BP: (!) 135/110 (05/16/22 0800)  SpO2: (!) 91 % (05/16/22 0800)    INTAKE AND OUTPUT (LAST 24 HOURS):    Intake/Output Summary (Last 24 hours) at 5/16/2022 1026  Last data filed at 5/15/2022 2108  Gross per 24 hour   Intake 100 ml   Output --   Net 100 ml       WEIGHT  Wt Readings from Last 1 Encounters:   05/15/22 78.7 kg (173 lb 8 oz)       PHYSICAL EXAM  GENERAL: Older patient in no distress.  Too confused to answer questions about chronicity.  HEENT: Pupils equal and reactive. Extraocular movements intact. Nose intact. Pharynx  moist.  Dentures in the maxillary and mandibular location  NECK: Supple.   HEART: Regular rate and rhythm. No murmur or gallop auscultated.  LUNGS:  There are crackles in the right base.  Lung excursion symmetrical. No change in fremitus.  ABDOMEN: Bowel sounds present. Non-tender, no masses palpated.  : Normal anatomy.  EXTREMITIES: Normal muscle tone and joint movement, no cyanosis or clubbing.   LYMPHATICS: No adenopathy palpated, no edema.  SKIN: Dry, intact, no lesions.  The skin on the feet is very thickened and cracked.  The patient goes barefoot.  NEURO: Cranial nerves II-XII intact. Motor strength 5/5 bilaterally, upper and lower extremities.  The patient is very confused.  PSYCH: Appropriate affect    CBC LAST (LAST 24 HOURS)  Recent Labs   Lab 05/16/22 0421   WBC 12.67   RBC 4.62   HGB 11.9*   HCT 36.2*   MCV 78*   MCH 25.8*   MCHC 32.9   RDW 14.5      MPV 9.2   GRAN 91.6*  11.6*   LYMPH 6.2*  0.8*   MONO 1.0*  0.1*   BASO 0.02   NRBC 0       CHEMISTRY LAST (LAST 24 HOURS)  Recent Labs   Lab 05/15/22  1743 05/15/22  1828 05/16/22  0421   NA  --    < > 138   K  --    < > 3.1*   CL  --    < > 96   CO2  --    < > 32*   ANIONGAP  --    < > 10   BUN  --    < > 30*   CREATININE  --    < > 1.1   GLU  --    < > 158*   CALCIUM  --    < > 13.0*   PH 7.503*  --   --    MG  --    < > 1.5*   ALBUMIN  --    < > 2.0*   PROT  --    < > 6.5   ALKPHOS  --    < > 90   ALT  --    < > 18   AST  --    < > 19   BILITOT  --    < > 0.8    < > = values in this interval not displayed.       COAGULATION LAST (LAST 24 HOURS)  Recent Labs   Lab 05/15/22  1739   LABPT 14.6*   INR 1.2   APTT 22.0*       CARDIAC PROFILE (LAST 24 HOURS)  Recent Labs   Lab 05/15/22  1739 05/15/22  1828   BNP 45  --    TROPONINI  --  0.033       LAST 7 DAYS MICROBIOLOGY   Microbiology Results (last 7 days)     Procedure Component Value Units Date/Time    Urine culture [[id number]]  (Abnormal) Collected: 05/15/22 9496    Order Status: Completed  Specimen: Urine Updated: 05/16/22 0700     Urine Culture, Routine GRAM NEGATIVE VIRAL, LACTOSE   >100,000 cfu/ml  Identification and susceptibility pending      Narrative:      Specimen Source->Urine    Blood culture x two cultures. Draw prior to antibiotics. [[id number]] Collected: 05/15/22 1740    Order Status: Completed Specimen: Blood from Peripheral, Antecubital, Right Updated: 05/16/22 0117     Blood Culture, Routine No Growth to date    Narrative:      Aerobic and anaerobic    Blood culture x two cultures. Draw prior to antibiotics. [[id number]] Collected: 05/15/22 1745    Order Status: Completed Specimen: Blood from Peripheral, Forearm, Left Updated: 05/15/22 2358     Blood Culture, Routine No Growth to date    Narrative:      Aerobic and anaerobic          MOST RECENT IMAGING  X-Ray Chest AP Portable  Examination: Single view the chest.    CLINICAL HISTORY: Shortness of breath and weakness.    TECHNIQUE: Erect AP view the chest.    FINDINGS: Heart is upper normal in size. Large area alveolar consolidation occupying virtually the entirety of the right lower lobe causing partial silhouetting of the right hemidiaphragm. Left lung is clear. Few scattered linear striations in the left lower lobe secondary to scarring/atelectasis. Pulmonary vascularity is not engorged. Patient has an anterior cervical fusion plate occupy the uppermost field of view.    IMPRESSION: Right lower lobe pneumonia. Recommend radiographic follow-up until clearing.    Electronically signed by:  Kevon Dennis MD  5/15/2022 6:31 PM CDT Workstation: [id number]      CURRENT VISIT EKG  Results for orders placed or performed during the hospital encounter of 05/15/22   EKG 12-lead    Narrative    Test Reason : R06.02,    Vent. Rate : 107 BPM     Atrial Rate : 107 BPM     P-R Int : 132 ms          QRS Dur : 078 ms      QT Int : 332 ms       P-R-T Axes : 082 026 017 degrees     QTc Int : 443 ms    Sinus tachycardia with Fusion  complexes  Nonspecific ST abnormality  Abnormal ECG  No previous ECGs available    Referred By: [email or web address]   SELF           Confirmed By:        ECHOCARDIOGRAM RESULTS  No results found for this or any previous visit.        VENTILATOR INFORMATION  Oxygen Concentration (%):  [] 50       LAST ARTERIAL BLOOD GAS  ABG  Recent Labs   Lab 05/15/22  1743   PH 7.503*   PO2 48   PCO2 46.0*   HCO3 36.1*   BE 13       IMPRESSION AND PLAN  Right lower lobe pneumonia  Tobacco abuse  Hypercalcemia of unknown source  Urinary tract infection  Anemia  Hypokalemia  Hypercalcemia    Continue IV fluid  Agree with CT of chest  Patient may require more pamidronate  Oxygen to keep sats greater than 90  Continue broad-spectrum antibiotics pending ID of organisms  Tobacco cessation counseling  Trend electrolytes and correct as needed  Continue bronchodilators    Critical care time spent reviewing the chart, examining the patient, reviewing the labs, reviewing the radiological findings, discussing care with nursing, physicians, and respiratory and creating the note and  has been greater than 35 minutes.    Kimberly Tellez MD  [facility]  Department of Pulmonology  Date of Service: 05/16/2022  10:27 AM

## 2022-05-16 NOTE — SUBJECTIVE & OBJECTIVE
Interval History:  Patient is aloof and seemed disinterested with her medical care despite her high level of acuity.  She generally avoids doctors.  She has never had a colonoscopy.  She has an extensive smoking history.    According to the  at bedside, the patient has progressively worsening cough with associated weight loss for the last 4 weeks.      Review of Systems   Constitutional:  Positive for unexpected weight change. Negative for chills and fever.   Respiratory:  Positive for shortness of breath.    Cardiovascular:  Negative for chest pain.   Gastrointestinal:  Negative for abdominal pain.   Endocrine: Positive for polyuria.   Genitourinary:  Negative for dysuria.   Skin:  Negative for rash.   Objective:     Vital Signs (Most Recent):  Temp: 97.6 °F (36.4 °C) (05/16/22 0745)  Pulse: 73 (05/16/22 1600)  Resp: (!) 27 (05/16/22 1600)  BP: (!) 151/67 (05/16/22 1500)  SpO2: (!) 90 % (05/16/22 1600)   Vital Signs (24h Range):  Temp:  [97.6 °F (36.4 °C)-97.9 °F (36.6 °C)] 97.6 °F (36.4 °C)  Pulse:  [] 73  Resp:  [15-37] 27  SpO2:  [84 %-100 %] 90 %  BP: ()/(50-74) 151/67     Weight: 78.7 kg (173 lb 8 oz)  Body mass index is 27.17 kg/m².    Intake/Output Summary (Last 24 hours) at 5/16/2022 1653  Last data filed at 5/16/2022 1215  Gross per 24 hour   Intake 1060 ml   Output --   Net 1060 ml      Physical Exam  Vitals reviewed.   Cardiovascular:      Rate and Rhythm: Normal rate and regular rhythm.   Pulmonary:      Comments: High-flow nasal cannula in place.  Right lower lung fields with crackles.  Abdominal:      General: Bowel sounds are normal.      Tenderness: There is no abdominal tenderness.   Musculoskeletal:      Cervical back: Neck supple.   Skin:     General: Skin is warm.      Capillary Refill: Capillary refill takes less than 2 seconds.   Neurological:      General: No focal deficit present.      Comments: Following commands.  Moving all 4 extremities.  Conversant.       Significant  Labs: All pertinent labs within the past 24 hours have been reviewed.  CBC:   Recent Labs   Lab 05/15/22  1739 05/16/22  0421   WBC 18.76* 12.67   HGB 13.8 11.9*   HCT 43.4 36.2*   * 373     CMP:   Recent Labs   Lab 05/15/22  1828 05/16/22  0421 05/16/22  1204    138 137   K 2.7* 3.1* 3.4*   CL 94* 96 94*   CO2 30* 32* 29   * 158* 149*   BUN 32* 30* 31*   CREATININE 1.2 1.1 1.1   CALCIUM 14.3* 13.0* 13.1*   PROT 7.3 6.5 6.8   ALBUMIN 2.3* 2.0* 2.2*   BILITOT 0.8 0.8 0.8   ALKPHOS 110 90 92   AST 22 19 20   ALT 18 18 18   ANIONGAP 15 10 14   EGFRNONAA 45.9* 51.0* 51.0*       Significant Imaging: I have reviewed all pertinent imaging results/findings within the past 24 hours.

## 2022-05-17 PROBLEM — N39.0 UTI (URINARY TRACT INFECTION): Status: ACTIVE | Noted: 2022-01-01

## 2022-05-17 NOTE — NURSING TRANSFER
Nursing Transfer Note      5/17/2022     Reason patient is being transferred: Stepdown     Transfer To: 1214    Transfer via wheelchair    Transfer with 4L to O2, cardiac monitoring    Transported by CHENCHO Ramsey RN     Medicines sent: Yes    Any special needs or follow-up needed: No    Chart send with patient: Yes    Notified: spouse    Patient reassessed at: 05/17/2022 1331    Upon arrival to floor: patient oriented to room, call bell in reach and bed in lowest position

## 2022-05-17 NOTE — RESPIRATORY THERAPY
05/16/22 2001   Patient Assessment/Suction   Level of Consciousness (AVPU) alert   Respiratory Effort Normal;Unlabored   Expansion/Accessory Muscles/Retractions no use of accessory muscles   All Lung Fields Breath Sounds equal bilaterally;diminished   Rhythm/Pattern, Respiratory unlabored   Cough Frequency infrequent   Cough Type good;dry;nonproductive   PRE-TX-O2   O2 Device (Oxygen Therapy) High Flow nasal Cannula   Flow (L/min) 4   SpO2 (!) 94 %   Pulse Oximetry Type Continuous   $ Pulse Oximetry - Multiple Charge Pulse Oximetry - Multiple   Pulse 73   Resp 20   Aerosol Therapy   $ Aerosol Therapy Charges Aerosol Treatment   Daily Review of Necessity (SVN) completed   Respiratory Treatment Status (SVN) given   Treatment Route (SVN) oxygen;mask   Patient Position (SVN) semi-Foster's   Post Treatment Assessment (SVN) breath sounds unchanged   Signs of Intolerance (SVN) none   Breath Sounds Post-Respiratory Treatment   Post-treatment Heart Rate (beats/min) 73   Post-treatment Resp Rate (breaths/min) 18   Education   $ Education Bronchodilator;15 min   Respiratory Evaluation   $ Care Plan Tech Time 15 min   $ Eval/Re-eval Charges Re-evaluation   Evaluation For New Orders

## 2022-05-17 NOTE — PROGRESS NOTES
"Nephrology Consult Note        Patient Name: Jia Vega  MRN: [id number]    Patient Class: IP- Inpatient   Admission Date: 5/15/2022  Length of Stay: 2 days  Date of Service: 5/17/2022    Attending Physician: Mini Sutherland MD  Primary Care Provider: Patrick Olson MD    Reason for Consult: hypercalcemia/hyponatremia/uti/alkalosis/anemia/htn     SUBJECTIVE:     HPI: 71F with GERD, Asthma, life long nicotine addiction presented to ED complaining of 7-10 days of progressively worsening SOB, worsening cough with scant sputum, aunquenchable thirst ("I can't stop drinking ice water"). She denied any chest pain. No orthopnea, PND, nor pedal edema. She has "Bone pain" in her legs and lower back--7/10 constant aching worse with palpation and movement. When she presented she was hypoxic, eventually requiring Vapotherm to maintain good saturation. She was initially tachycardic and hypotensive, but responded well to the 30 ml/kg NS for sepsis.     In ED: labs reviewed: Leukocytosis with normal H/H; hypokalemia (treated in ED, and sliding scale started) with stage 3a/b renal dysfunction, non-fasting hyperglycemia, severe hypercalcemia and hypoalbuminemia (calculated Calcium = 15.98).; elevated lactate. Flu and COVID negative. U/A: dirty urine. Patient was pan-cultured. CXR reviewed: large RLL pneumonia (no history of N/V). EKG reviewed: sinus tach without acute segments. Started NS infusion 150 ml/hr after the 30 ml/kg had finished and Aredia 30 mg iv X 1 dose; broad spectrum antibiotics started with nebs and steroids.     5/17 VSS, no new complains.    Past Medical History:   Diagnosis Date    Arthritis     Asthma     Full dentures     GERD (gastroesophageal reflux disease)     Hypertension     Seasonal allergies     Wears glasses      Past Surgical History:   Procedure Laterality Date    BACK SURGERY      cubital tunnel release  left    HAND SURGERY      right and left: ganglion cyst    HYSTERECTOMY  BSO "    NECK SURGERY       Family History   Family history unknown: Yes     Social History     Tobacco Use    Smoking status: Current Every Day Smoker     Packs/day: 0.25     Years: 40.00     Pack years: 10.00     Types: Cigarettes    Smokeless tobacco: Never Used    Tobacco comment: patient states 3 cigs per day   Substance Use Topics    Alcohol use: No     Alcohol/week: 1.7 standard drinks     Types: 2 Standard drinks or equivalent per week    Drug use: No       Review of patient's allergies indicates:  No Known Allergies    Outpatient meds:  No current facility-administered medications on file prior to encounter.     Current Outpatient Medications on File Prior to Encounter   Medication Sig Dispense Refill    albuterol (PROVENTIL/VENTOLIN HFA) 90 mcg/actuation inhaler INHALE 1 PUFF BY MOUTH EVERY 4 TO 6 HOURS AS NEEDED FOR WHEEZING OR SHORTNESS OF BREATH (Patient taking differently: Inhale 1 puff into the lungs every 4 to 6 hours as needed. INHALE 1 PUFF BY MOUTH EVERY 4 TO 6 HOURS AS NEEDED FOR WHEEZING OR SHORTNESS OF BREATH) 8.5 g 2    amlodipine-olmesartan (ALICE) 5-20 mg per tablet TAKE 1 TABLET BY MOUTH DAILY (Patient taking differently: Take 1 tablet by mouth once daily.) 90 tablet 1    aspirin (ECOTRIN) 81 MG EC tablet Take 1 tablet (81 mg total) by mouth Daily. 100 tablet 4    guaiFENesin (MUCINEX) 600 mg 12 hr tablet Take 2 tablets (1,200 mg total) by mouth 2 (two) times daily. 20 tablet 0    multivitamin capsule Take 1 capsule by mouth once daily.      omeprazole (PRILOSEC) 40 MG capsule Take 1 capsule (40 mg total) by mouth once daily. 30 capsule 5    oxybutynin (DITROPAN) 5 MG Tab TAKE 1 TABLET BY MOUTH TWICE DAILY( WATCH FOR DRY MOUTH AND CONSTIPATION) (Patient taking differently: Take 5 mg by mouth 2 (two) times daily. TAKE 1 TABLET BY MOUTH TWICE DAILY( WATCH FOR DRY MOUTH AND CONSTIPATION)) 60 tablet 5    pravastatin (PRAVACHOL) 80 MG tablet Take 1 tablet (80 mg total) by mouth every  evening. 90 tablet 0    [] promethazine-dextromethorphan (PROMETHAZINE-DM) 6.25-15 mg/5 mL Syrp Take 5 mLs by mouth every 8 (eight) hours as needed (As needed for cough and congestion). Do not drive on this medication because of drowsiness. 120 mL 0    sertraline (ZOLOFT) 50 MG tablet TAKE 1 TABLET(50 MG) BY MOUTH EVERY NIGHT (Patient taking differently: Take 50 mg by mouth every evening.) 90 tablet 0    VITAMIN B COMPLEX (B COMPLEX 1 ORAL) Take 1 tablet by mouth Daily.         Scheduled meds:   albuterol-ipratropium  3 mL Nebulization Q6H WAKE    aspirin  81 mg Oral Daily    cefTRIAXone (ROCEPHIN) IVPB  1 g Intravenous Q24H    chlorhexidine  15 mL Mouth/Throat BID    doxycycline  100 mg Oral Q12H    enoxaparin  40 mg Subcutaneous Daily    guaiFENesin  1,200 mg Oral BID    multivitamin  1 tablet Oral Daily    mupirocin   Nasal BID    nystatin  500,000 Units Oral QID    oxybutynin  5 mg Oral BID    pantoprazole  40 mg Oral Before breakfast    pravastatin  80 mg Oral QHS    predniSONE  40 mg Oral Daily    sertraline  50 mg Oral QHS       Infusions:   0.45% NaCl with KCl 40 mEq infusion 150 mL/hr (22 2200)       PRN meds:  acetaminophen, calcium chloride IVPB, calcium chloride IVPB, calcium chloride IVPB, dextrose 10%, dextrose 10%, insulin aspart U-100, magnesium oxide, magnesium sulfate IVPB, magnesium sulfate IVPB, magnesium sulfate IVPB, magnesium sulfate IVPB, melatonin, ondansetron, potassium chloride, potassium chloride, potassium chloride, potassium chloride    Review of Systems:    OBJECTIVE:     Vital Signs and IO (Last 24H):  Temp:  [97.4 °F (36.3 °C)-98 °F (36.7 °C)]   Pulse:  [64-97]   Resp:  [16-34]   BP: (111-151)/(50-74)   SpO2:  [90 %-98 %]   I/O last 3 completed shifts:  In: 4642.6 [P.O.:1880; I.V.:2162.5; IV Piggyback:600.1]  Out: 2600 [Urine:2600]    Wt Readings from Last 5 Encounters:   22 77.9 kg (171 lb 11.8 oz)   22 83.5 kg (184 lb)   21 93.9 kg  (207 lb 0.2 oz)   05/07/21 93.9 kg (207 lb)   10/01/19 90.3 kg (199 lb)     Physical Exam:  Physical Exam  Constitutional:       Appearance: She is well-developed. She is not diaphoretic.   HENT:      Head: Normocephalic and atraumatic.   Eyes:      General: No scleral icterus.     Pupils: Pupils are equal, round, and reactive to light.   Cardiovascular:      Rate and Rhythm: Normal rate and regular rhythm.   Pulmonary:      Effort: Pulmonary effort is normal. No respiratory distress.      Breath sounds: No stridor.   Abdominal:      General: There is no distension.      Palpations: Abdomen is soft.   Musculoskeletal:         General: No deformity. Normal range of motion.      Cervical back: Neck supple.   Skin:     General: Skin is warm and dry.      Findings: No erythema or rash.   Neurological:      Mental Status: She is alert and oriented to person, place, and time.      Cranial Nerves: No cranial nerve deficit.   Psychiatric:         Behavior: Behavior normal.       Body mass index is 26.9 kg/m².    Laboratory:  Recent Labs   Lab 05/16/22  0421 05/16/22  1204 05/17/22  0539    137 137   K 3.1* 3.4* 3.5   CL 96 94* 100   CO2 32* 29 27   BUN 30* 31* 26*   CREATININE 1.1 1.1 1.0   ESTGFRAFRICA 58.8* 58.8* >60.0   EGFRNONAA 51.0* 51.0* 57.2*   * 149* 117*       Recent Labs   Lab 05/15/22  1828 05/16/22  0421 05/16/22  1204 05/17/22  0539   CALCIUM 14.3* 13.0* 13.1* 10.1   ALBUMIN 2.3* 2.0* 2.2* 2.0*   PHOS 3.2  --   --   --    MG 1.6 1.5* 1.5* 1.9       Recent Labs   Lab 05/15/22  1739 05/16/22  0421 05/17/22  0540   WBC 18.76* 12.67 18.25*   HGB 13.8 11.9* 12.2   HCT 43.4 36.2* 37.6   * 373 372   MCV 81* 78* 79*   MCHC 31.8* 32.9 32.4   MONO 4.8  0.9 1.0*  0.1* 5.8  1.1*       Recent Labs   Lab 05/16/22  0421 05/16/22  1204 05/17/22  0539   BILITOT 0.8 0.8 0.8   PROT 6.5 6.8 6.2   ALBUMIN 2.0* 2.2* 2.0*   ALKPHOS 90 92 90   ALT 18 18 19   AST 19 20 19       Recent Labs   Lab 05/15/22  1816    Color, UA Yellow   Appearance, UA Cloudy A   pH, UA 6.0   Specific Gravity, UA 1.025   Protein, UA 2+ A   Glucose, UA Negative   Ketones, UA Negative   Urobilinogen, UA 1.0   Bilirubin (UA) 1+ A   Occult Blood UA 3+ A   Nitrite, UA Negative   RBC, UA 9 H   WBC, UA >100 H   Bacteria Occasional   Hyaline Casts, UA 2370 A       Recent Labs   Lab 05/15/22  1743   POC PH 7.503 H   POC PCO2 46.0 H   POC HCO3 36.1 H   POC PO2 48   POC SATURATED O2 86 L   POC BE 13   Sample VENOUS       Microbiology Results (last 7 days)     Procedure Component Value Units Date/Time    Urine culture [[id number]]  (Abnormal)  (Susceptibility) Collected: 05/15/22 1816    Order Status: Completed Specimen: Urine Updated: 05/17/22 0635     Urine Culture, Routine ESCHERICHIA COLI  >100,000 cfu/ml      Narrative:      Specimen Source->Urine    Blood culture x two cultures. Draw prior to antibiotics. [[id number]] Collected: 05/15/22 1740    Order Status: Completed Specimen: Blood from Peripheral, Antecubital, Right Updated: 05/16/22 1832     Blood Culture, Routine No Growth to date      No Growth to date    Narrative:      Aerobic and anaerobic    Blood culture x two cultures. Draw prior to antibiotics. [[id number]] Collected: 05/15/22 1745    Order Status: Completed Specimen: Blood from Peripheral, Forearm, Left Updated: 05/16/22 1832     Blood Culture, Routine No Growth to date      No Growth to date    Narrative:      Aerobic and anaerobic        ASSESSMENT/PLAN:     Hypercalcemia  Alkalosis  Hypokalemia  UTI and/or PNA  No NSAIDs, ACEI/ARB, IV contrast or other nephrotoxins.  Keep MAP > 60, SBP > 100.  Treat UTI.  Low PTH, look for malignancy. PTH-rp pending.  Got pamidronate and IVF.  Calcitonin BID for 2 doses on 5/16, no need to repeat for now.    Anemia  Hgb and HCT are acceptable. Monitor.    HTN, now hypotension  BP seem controlled.   Tolerate asymptomatic HTN up to -160.  HOLD home meds.  Low sodium diet.    Thank you for allowing  us to participate in the care of your patient!   We will follow the patient and provide recommendations as needed.    Patient care time was spent personally by me on the following activities:   · Obtaining a history.  · Examination of patient.  · Providing medical care at the patients bedside.  · Developing a treatment plan with patient or surrogate and bedside caregivers.  · Ordering and reviewing laboratory studies, radiographic studies, pulse oximetry.  · Ordering and performing treatments and interventions.  · Evaluation of patient's response to treatment.  · Discussions with consultants while on the unit and immediately available to the patient.  · Re-evaluation of the patient's condition.  · Documentation in the medical record.     Damian Don MD    [facility]  [address] [place] [address] [id number] [phone] - tel  [phone] - fax    5/17/2022

## 2022-05-17 NOTE — PLAN OF CARE
Problem: Adult Inpatient Plan of Care  Goal: Plan of Care Review  Outcome: Ongoing, Progressing  Goal: Patient-Specific Goal (Individualized)  Outcome: Ongoing, Progressing  Goal: Absence of Hospital-Acquired Illness or Injury  Outcome: Ongoing, Progressing  Goal: Optimal Comfort and Wellbeing  Outcome: Ongoing, Progressing  Goal: Readiness for Transition of Care  Outcome: Ongoing, Progressing     Problem: Adjustment to Illness (Sepsis/Septic Shock)  Goal: Optimal Coping  Outcome: Ongoing, Progressing     Problem: Bleeding (Sepsis/Septic Shock)  Goal: Absence of Bleeding  Outcome: Ongoing, Progressing     Problem: Glycemic Control Impaired (Sepsis/Septic Shock)  Goal: Blood Glucose Level Within Desired Range  Outcome: Ongoing, Progressing     Problem: Infection Progression (Sepsis/Septic Shock)  Goal: Absence of Infection Signs and Symptoms  Outcome: Ongoing, Progressing     Problem: Nutrition Impaired (Sepsis/Septic Shock)  Goal: Optimal Nutrition Intake  Outcome: Ongoing, Progressing     Problem: Fluid Imbalance (Pneumonia)  Goal: Fluid Balance  Outcome: Ongoing, Progressing     Problem: Infection (Pneumonia)  Goal: Resolution of Infection Signs and Symptoms  Outcome: Ongoing, Progressing     Problem: Respiratory Compromise (Pneumonia)  Goal: Effective Oxygenation and Ventilation  Outcome: Ongoing, Progressing     Problem: Infection  Goal: Absence of Infection Signs and Symptoms  Outcome: Ongoing, Progressing

## 2022-05-17 NOTE — PROGRESS NOTES
Therapy with Vancomycin complete and / or consult / order discontinued by Dr Tellez on 5/17 @ 0755     Pharmacy will sign off, please re-consult as needed.    Thank you for allowing us to participate in this patient's care.  Graham Golden 5/17/2022 7:59 AM  Dept of Pharmacy  Ext [phone]

## 2022-05-17 NOTE — PROGRESS NOTES
[facility]  Progress Note    Patient name: Jia Vega  MRN: [id number]  Admit Date: 5/15/2022   LOS: 2 days     SUBJECTIVE:     Principal problem: Acute hypoxemic respiratory failure    Interval History:  Patient was seen and examined bedside.  As per nursing staff she frequently takes her oxygen off, does not like to, out of bed.  Denies any new symptoms except cough and generalized weakness.  Calcium is much better    Scheduled Meds:   albuterol-ipratropium  3 mL Nebulization Q6H WAKE    aspirin  81 mg Oral Daily    cefTRIAXone (ROCEPHIN) IVPB  1 g Intravenous Q24H    chlorhexidine  15 mL Mouth/Throat BID    doxycycline  100 mg Oral Q12H    enoxaparin  40 mg Subcutaneous Daily    guaiFENesin  1,200 mg Oral BID    multivitamin  1 tablet Oral Daily    mupirocin   Nasal BID    nystatin  500,000 Units Oral QID    oxybutynin  5 mg Oral BID    pantoprazole  40 mg Oral Before breakfast    pravastatin  80 mg Oral QHS    predniSONE  40 mg Oral Daily    sertraline  50 mg Oral QHS     Continuous Infusions:   0.45% NaCl with KCl 40 mEq infusion 100 mL/hr (05/17/22 0755)     PRN Meds:acetaminophen, calcium chloride IVPB, calcium chloride IVPB, calcium chloride IVPB, dextrose 10%, dextrose 10%, insulin aspart U-100, magnesium oxide, magnesium sulfate IVPB, magnesium sulfate IVPB, magnesium sulfate IVPB, magnesium sulfate IVPB, melatonin, ondansetron, potassium chloride, potassium chloride, potassium chloride, potassium chloride    Review of patient's allergies indicates:  No Known Allergies    Review of Systems: As per interval history    OBJECTIVE:     Vital Signs (Most Recent)  Temp: 97.6 °F (36.4 °C) (05/17/22 0745)  Pulse: 79 (05/17/22 1030)  Resp: (!) 25 (05/17/22 1030)  BP: (!) 104/56 (05/17/22 1000)  SpO2: (!) 94 % (05/17/22 1030)    Vital Signs Range (Last 24H):  Temp:  [97.4 °F (36.3 °C)-98 °F (36.7 °C)]   Pulse:  []   Resp:  [16-40]   BP: (104-151)/(50-73)    SpO2:  [86 %-98 %]     I & O (Last 24H):    Intake/Output Summary (Last 24 hours) at 5/17/2022 1040  Last data filed at 5/17/2022 0400  Gross per 24 hour   Intake 4542.55 ml   Output 2600 ml   Net 1942.55 ml       Physical Exam:  General:  Chronically ill-appearing  Eyes: No conjunctival injection. No scleral icterus.  ENT: Hearing grossly intact. No discharge from ears. No nasal discharge.   Neck: Supple, trachea midline. No JVD  CVS: RRR. No LE edema BL  Lungs:  On supplemental oxygen, bilateral rhonchi noted  Abdomen:  Soft, nontender and nondistended.  No organomegaly  Neuro: AOx3. Moves all extremities. Follows commands. Responds appropriately   Skin:  No rash or erythema noted    Laboratory:  I have reviewed all pertinent lab results within the past 24 hours.    Diagnostic Results:  Reviewed all imaging    ASSESSMENT/PLAN:     71-year-old lady with prior history of heavy smoking dependence, COPD being treated for right lower lung pneumonia, hypercalcemia    Active Hospital Problems    Diagnosis  POA    *Acute hypoxemic respiratory failure [J96.01]  Yes    Pyuria [R82.81]  Yes    Pneumonia of right lower lobe due to infectious organism [J18.9]  Yes    Hypercalcemia [E83.52]  Yes    Hypercholesterolemia [E78.00]  Yes     11/25/2014:-Patient is tolerating her statin medications.      Chronic obstructive pulmonary disease [J44.9]  Yes      Resolved Hospital Problems   No resolved problems to display.         Plan:   Overall improving however severe lack of regards to her health, does not like to get out of bed  Tessalon Perle for cough  Continue Rocephin and doxycycline for pneumonia and UTI  There is a strong concern of malignancy considering her hypercalcemia, smoking history.  She will need outpatient imaging once she recovers from current episode  Wean oxygen as tolerated  Calcium is much better after receiving 1 dose of pamidronate and calcitonin.  Will continue gentle IV hydration.  Help from  Nephrology appreciated  Continue bronchodilators, p.o. steroids  Out of bed to chair  Encourage ambulation, p.o. nutrition  Transfer to [facility]  Counseled extensively on smoking cessation, compliance with medications    VTE Risk Mitigation (From admission, onward)         Ordered     enoxaparin injection 40 mg  Daily         05/16/22 0931     IP VTE HIGH RISK PATIENT  Once         05/16/22 0931     Place sequential compression device  Until discontinued         05/16/22 0931                    Department Hospital Medicine  [facility]  Mini Sutherland MD  Date of service: 05/17/2022        Please note: This note was transcribed using voice recognition software. Because of this technology, there are often uinintended grammatical, spelling, and other transcription errors. Please disregard these errors.

## 2022-05-17 NOTE — CARE UPDATE
05/17/22 0725   Patient Assessment/Suction   Level of Consciousness (AVPU) alert   Respiratory Effort Normal;Unlabored   All Lung Fields Breath Sounds diminished;clear   PRE-TX-O2   O2 Device (Oxygen Therapy) nasal cannula w/ humidification   $ Is the patient on Low Flow Oxygen? Yes   $ Is the patient on High Flow Oxygen? Yes   $ Vapotherm Daily Charge Vapotherm Daily  (sb)   Flow (L/min) 4   SpO2 95 %   Pulse Oximetry Type Continuous   $ Pulse Oximetry - Multiple Charge Pulse Oximetry - Multiple   Pulse 75   Resp 16   Aerosol Therapy   $ Aerosol Therapy Charges Aerosol Treatment   Daily Review of Necessity (SVN) completed   Respiratory Treatment Status (SVN) given   Treatment Route (SVN) mask   Patient Position (SVN) HOB elevated   Post Treatment Assessment (SVN) breath sounds unchanged   Signs of Intolerance (SVN) none   Breath Sounds Post-Respiratory Treatment   Throughout All Fields Post-Treatment All Fields   Throughout All Fields Post-Treatment no change   Post-treatment Heart Rate (beats/min) 76   Post-treatment Resp Rate (breaths/min) 17   Respiratory Evaluation   $ Care Plan Tech Time 15 min

## 2022-05-17 NOTE — PROGRESS NOTES
Pulmonary/Critical Care Consult      PATIENT NAME: Jia Vega  MRN: [id number]  TODAY'S DATE: 2022  10:26 AM  ADMIT DATE: 5/15/2022  AGE: 71 y.o. : 1950    CONSULT REQUESTED BY: Huy Chun MD    REASON FOR CONSULT:   Pneumonia and hypercalcemia    HPI:  The patient is a 71-year-old female who presented to the emergency room with a week of progressive shortness of breath and cough.  She was also complaining of an un quintuple thirst.  She is complaining of bone pain in her legs and lower back.  She was hypotensive and tachycardic on presentation but responded well to a fluid bolus.  She has been given a dose of pamidronate.     The patient is very confused about how long she has been sick.  She tells me she has lost 50 lb but cannot tell me in what time frame.  She tells me she quit smoking but again cannot quantify how long ago that was.  She says she just got tired of smoking.     the patient is grumpy that she is being asked to wake up and sit up.  She states she is no longer having dysuria.  She states she is not clearing any mucus.    REVIEW OF SYSTEMS  GENERAL: Feeling tired.  EYES: Vision is good.  ENT: No sinusitis or pharyngitis.   HEART: No chest pain or palpitations.  LUNGS:  She is not clearing any mucus.  GI: No Nausea, vomiting, constipation, diarrhea, or reflux.  :  Dysuria has resolved  SKIN: No lesions or rashes.  MUSCULOSKELETAL: No joint pain or myalgias.  NEURO: No headaches or neuropathy.  LYMPH: No edema or adenopathy.  PSYCH: No anxiety or depression.  ENDO: No weight change.    No change in the patient's Past Medical History, Past Surgical History, Social History or Family History since admission.      VITAL SIGNS (MOST RECENT)  Temp: 97.7 °F (36.5 °C) (22 0321)  Pulse: 75 (22 0725)  Resp: 16 (22 0725)  BP: 127/60 (22 0501)  SpO2: 95 % (22 0725)    INTAKE AND OUTPUT (LAST 24 HOURS):    Intake/Output Summary (Last 24 hours) at  5/17/2022 0728  Last data filed at 5/17/2022 0400  Gross per 24 hour   Intake 4542.55 ml   Output 2600 ml   Net 1942.55 ml       WEIGHT  Wt Readings from Last 1 Encounters:   05/17/22 77.9 kg (171 lb 11.8 oz)       PHYSICAL EXAM  GENERAL: Older patient in no distress.   HEENT: Pupils equal and reactive. Extraocular movements intact. Nose intact. Pharynx moist.  Dentures in the maxillary and mandibular location  NECK: Supple.   HEART: Regular rate and rhythm. No murmur or gallop auscultated.  LUNGS:  There are crackles in the right base.  Lung excursion symmetrical. No change in fremitus.  ABDOMEN: Bowel sounds present. Non-tender, no masses palpated.  : Normal anatomy.Purewick in place.  EXTREMITIES: Normal muscle tone and joint movement, no cyanosis or clubbing.   LYMPHATICS: No adenopathy palpated, no edema.  SKIN: Dry, intact, no lesions.  The skin on the feet is very thickened and cracked.  The patient goes barefoot.  NEURO: Cranial nerves II-XII intact. Motor strength 5/5 bilaterally, upper and lower extremities.    PSYCH:  Very grumpy.    CBC LAST (LAST 24 HOURS)  Recent Labs   Lab 05/17/22  0540   WBC 18.25*   RBC 4.77   HGB 12.2   HCT 37.6   MCV 79*   MCH 25.6*   MCHC 32.4   RDW 14.6*      MPV 8.8*   GRAN 87.3*  15.9*   LYMPH 6.0*  1.1   MONO 5.8  1.1*   BASO 0.02   NRBC 0       CHEMISTRY LAST (LAST 24 HOURS)  Recent Labs   Lab 05/17/22  0539      K 3.5      CO2 27   ANIONGAP 10   BUN 26*   CREATININE 1.0   *   CALCIUM 10.1   MG 1.9   ALBUMIN 2.0*   PROT 6.2   ALKPHOS 90   ALT 19   AST 19   BILITOT 0.8         CARDIAC PROFILE (LAST 24 HOURS)  Recent Labs   Lab 05/15/22  1739 05/15/22  1828   BNP 45  --    TROPONINI  --  0.033       LAST 7 DAYS MICROBIOLOGY   Microbiology Results (last 7 days)     Procedure Component Value Units Date/Time    Urine culture [[id number]]  (Abnormal)  (Susceptibility) Collected: 05/15/22 1816    Order Status: Completed Specimen: Urine Updated:  05/17/22 0635     Urine Culture, Routine ESCHERICHIA COLI  >100,000 cfu/ml      Narrative:      Specimen Source->Urine    Blood culture x two cultures. Draw prior to antibiotics. [[id number]] Collected: 05/15/22 1740    Order Status: Completed Specimen: Blood from Peripheral, Antecubital, Right Updated: 05/16/22 1832     Blood Culture, Routine No Growth to date      No Growth to date    Narrative:      Aerobic and anaerobic    Blood culture x two cultures. Draw prior to antibiotics. [[id number]] Collected: 05/15/22 1745    Order Status: Completed Specimen: Blood from Peripheral, Forearm, Left Updated: 05/16/22 1832     Blood Culture, Routine No Growth to date      No Growth to date    Narrative:      Aerobic and anaerobic          MOST RECENT IMAGING  CT Chest Without Contrast  CMS MANDATED QUALITY DATA - CT RADIATION - 436    All CT scans at this facility utilize dose modulation, iterative reconstruction, and/or weight based dosing when appropriate to reduce radiation dose to as low as reasonably achievable.    Reason: Shortness of breath, abnormal chest x-ray    TECHNIQUE: CT thorax without contrast    COMPARISON: Chest x-ray dated 5/15/2022    CT THORAX:  The visualized portion of the base of the neck is normal.    There is dense alveolar consolidation within the right lower lobe compatible with pneumonia. There are moderate emphysematous changes.  The right upper lobe and left lung are clear. The trachea and bronchi are normal.    There is right paratracheal and subcarinal adenopathy. There is mediastinal and right hilar adenopathy. There are calcifications within several of the hilar nodes.  -The largest right paratracheal node is 2.0 x 1.3 cm  2.4 x 2.0 cm subcarinal node.  The adenopathy most likely is reactive. Follow-up radiographs to document clearing of the infiltrate is recommended.    The heart is normal in size. There is coronary artery calcification. There is no pericardial effusion.    The visualized  portion of the upper abdomen demonstrates bilateral adrenal hyperplasia. There are no acute osseous abnormalities. The musculature is normal.    IMPRESSION: Dense alveolar consolidation in the right lower lobe compatible with lobar pneumonia.    Moderate emphysematous changes bilaterally    Mildly prominent mediastinal and right hilar adenopathy most likely reactive.    Follow-up chest x-ray in 7-10 days to document clearing of the pneumonia as well as follow-up CT in three months is recommended    Electronically signed by:  Pauline Rodriguez MD  5/16/2022 11:43 AM CDT Workstation: [id number]      CURRENT VISIT EKG  Results for orders placed or performed during the hospital encounter of 05/15/22   EKG 12-lead    Narrative    Test Reason : R06.02,    Vent. Rate : 107 BPM     Atrial Rate : 107 BPM     P-R Int : 132 ms          QRS Dur : 078 ms      QT Int : 332 ms       P-R-T Axes : 082 026 017 degrees     QTc Int : 443 ms    Sinus tachycardia with Fusion complexes  Nonspecific ST abnormality  Abnormal ECG  No previous ECGs available    Referred By: [email or web address]   SELF           Confirmed By:        ECHOCARDIOGRAM RESULTS  No results found for this or any previous visit.        VENTILATOR INFORMATION  Oxygen Concentration (%):  [50] 50       LAST ARTERIAL BLOOD GAS  ABG  Recent Labs   Lab 05/15/22  1743   PH 7.503*   PO2 48   PCO2 46.0*   HCO3 36.1*   BE 13       IMPRESSION AND PLAN  Right lower lobe pneumonia  Acute hypoxic respiratory failure, improving  Tobacco abuse  Hypercalcemia of unknown source, correcting but still elevated when corrected for albumin  Urinary tract infection, sensitive E coli  Hypokalemia, better      Continue IV fluid, but decrease to 100 cc/hour  Oxygen to keep sats greater than 90  Discontinue vancomycin and Flagyl  Decrease Rocephin to 1 g daily   Add doxycycline 100 mg p.o. b.i.d. for pneumonia  Change Solu-Medrol to 40 mg prednisone  Chest x-ray in a.m.  Out of bed and ambulate  Tobacco  cessation counseling  Trend electrolytes and correct as needed  Continue bronchodilators  Okay to move out of ICU    Critical care time spent reviewing the chart, examining the patient, reviewing the labs, reviewing the radiological findings, discussing care with nursing, physicians, and respiratory and creating the note and  has been greater than 35 minutes    Kimberly Tellez MD  [facility]  Department of Pulmonology  Date of Service: 05/17/2022  10:27 AM

## 2022-05-18 PROBLEM — E83.52 HYPERCALCEMIA: Status: RESOLVED | Noted: 2022-01-01 | Resolved: 2022-01-01

## 2022-05-18 NOTE — DISCHARGE SUMMARY
"[facility] Medicine  Discharge Summary      Patient Name: Jia Vega  MRN: [id number]  Patient Class: IP- Inpatient  Admission Date: 5/15/2022  Hospital Length of Stay: 3 days  Discharge Date and Time:  05/18/2022 4:06 PM  Attending Physician: Micheline att. providers found   Discharging Provider: Mini Sutherland MD  Primary Care Provider: Patrick Olson MD      HPI:   71 year old female with history of GERD, Asthma, life long nicotine addiction and has had 2/3 COVID vaccinations presented to ED complaining of 7-10 days of progressively worsening SOB, worsening cough with scant sputum, and unquenchable thirst ("I can't stop drinking ice water"). She denied any chest pain. No orthopnea, PND, nor pedal edema. She has "Bone pain" in her legs and lower back--7/10 constant aching worse with palpation and movement. When she presented she was hypoxic, eventually requiring Vapotherm to maintain good saturation. She was initially tachycardic and hypotensive, but responded well to the 30 ml/kg NS for sepsis.    In ED: labs reviewed: Leukocytosis with normal H/H; hypokalemia (treated in ED, and sliding scale started) with stage 3a/b renal dysfunction, non-fasting hyperglycemia, severe hypercalcemia and hypoalbuminemia (calculated Calcium = 15.98).; elevated lactate. Flu and COVID negative. U/A: dirty urine. Patient was pan-cultured. CXR reviewed: large RLL pneumonia (no history of N/V). EKG reviewed: sinus tach without acute segments. Discussed with Nephrology which was current preferred agent to use in addition to NS for her hypercalcemia. Started NS infusion 150 ml/hr after the 30 ml/kg had finished and Aredia 30 mg iv X 1 dose; broad spectrum antibiotics started with nebs and steroids      * No surgery found *      Hospital Course:   71-year-old lady with prior history of heavy smoking dependence, COPD was treated for pneumonia, UTI, hypercalcemia and hypoxemic respiratory failure due to pneumonia " and COPD.  She improved remarkably with IV antibiotics, DuoNebs, steroids.  Hypercalcemia improved with IV hydration, pamidronate, couple doses of calcitonin.  It was extremely difficult to treat this patient due to her poor participation in any interventions, frequent refusals of lab draw and other interventions.  Today she appears back to her baseline, we arranged home oxygen on discharge.  Considering her heavy smoking history, infiltrates on chest imaging, hypercalcemia there is a strong concern of malignancy.  I spent considerable time explaining this findings to patient that it is of paramount importance that she should follow-up with her PCP who can arrange outpatient imaging in next 4 weeks to confirm resolution of this findings or else she might need bronchoscopy.  She was discharged in hemodynamically stable condition with prescription of Ceftin, tapering steroid.  During her hospital stay her blood pressure remained borderline low, I discontinued her antihypertensives and discharge.  Further management of hypertension as per PCP.     Instructions provided to follow up with primary care physician as outpatient. Patient verbalized understanding and is aware to contact primary care physician or return to ED if new or worsening symptoms.    Physical exam on the day of discharge:  General: Patient resting comfortably in no acute distress.  Lungs:  On supplemental oxygen, CTA. Good air entry.  CVS: Regular rate and rhythm. No murmurs. No pedal edema.  Abd: Soft. Nontender. Non-distended.      Vital signs reviewed.  Nursing notes reviewed.        Goals of Care Treatment Preferences:  Code Status: Full Code      Consults:   Consults (From admission, onward)        Status Ordering Provider     Inpatient consult to Pulmonology  Once        Provider:  Kimberly Tellez MD    Completed GLENROY ELLSWORTH     Inpatient consult to Nephrology  Once        Provider:  Davida Ferreira MD    Completed GLENROY ELLSWORTH      "Inpatient consult to Hospitalist  Once        Provider:  Glenroy Ellsworth MD    Acknowledged GLENROY ELLSWORTH.          No new Assessment & Plan notes have been filed under this hospital service since the last note was generated.  Service: Hospital Medicine    Final Active Diagnoses:    Diagnosis Date Noted POA    PRINCIPAL PROBLEM:  Acute hypoxemic respiratory failure [J96.01] 05/15/2022 Yes    Pneumonia of right lower lobe due to infectious organism [J18.9] 05/15/2022 Yes    UTI (urinary tract infection) [N39.0] 05/17/2022 Yes    Hypercholesterolemia [E78.00] 08/11/2017 Yes    Chronic obstructive pulmonary disease [J44.9] 08/11/2017 Yes      Problems Resolved During this Admission:    Diagnosis Date Noted Date Resolved POA    Hypercalcemia [E83.52] 05/15/2022 05/18/2022 Yes       Discharged Condition: good    Disposition: Home or Self Care    Follow Up:   Follow-up Information     Patrick Olson MD Follow up in 1 week(s).    Specialty: Internal Medicine  Contact information:  [address]  [address]  [place] [address]  [phone]             Kimberly Tellez MD Follow up in 1 month(s).    Specialties: Pulmonary Disease, Sleep Medicine  Contact information:  [address]  [address]  [place] [id number] [phone]                       Patient Instructions:      OXYGEN FOR HOME USE     Order Specific Question Answer Comments   Liter Flow 2    Duration Continuous    Qualifying Test Performed at: Activity    Oxygen saturation at rest 87    Oxygen saturation with activity 87    Oxygen saturation with activity on oxygen 92    Portable mode: continuous    Route nasal cannula    Device: home concentrator with portable tanks    Length of need (in months): 99 mos    Patient condition with qualifying saturation COPD    Height: 5' 7" (1.702 m)    Weight: 77.9 kg (171 lb 11.8 oz)    Alternative treatment measures have been tried or considered and deemed clinically ineffective. Yes      Diet Cardiac     Notify " your health care provider if you experience any of the following:  temperature >100.4     Notify your health care provider if you experience any of the following:  difficulty breathing or increased cough     Activity as tolerated       Significant Diagnostic Studies: Labs: All labs within the past 24 hours have been reviewed    Pending Diagnostic Studies:     Procedure Component Value Units Date/Time    PTH-related peptide [[id number]] Collected: 05/16/22 1514    Order Status: Sent Lab Status: In process Updated: 05/16/22 1525    Specimen: Blood     Protein electrophoresis, serum [[id number]] Collected: 05/17/22 1344    Order Status: Sent Lab Status: In process Updated: 05/17/22 1426    Specimen: Blood          Medications:  Reconciled Home Medications:      Medication List      START taking these medications    benzonatate 200 MG capsule  Commonly known as: TESSALON  Take 1 capsule (200 mg total) by mouth 3 (three) times daily. for 10 days     cefUROXime 500 MG tablet  Commonly known as: CEFTIN  Take 1 tablet (500 mg total) by mouth 2 (two) times daily. for 4 days     predniSONE 10 MG tablet  Commonly known as: DELTASONE  Take 4 tablets (40 mg total) by mouth once daily for 3 days, THEN 3 tablets (30 mg total) once daily for 3 days, THEN 2 tablets (20 mg total) once daily for 3 days, THEN 1 tablet (10 mg total) once daily for 3 days.  Start taking on: May 19, 2022        CHANGE how you take these medications    albuterol 90 mcg/actuation inhaler  Commonly known as: PROVENTIL/VENTOLIN HFA  INHALE 1 PUFF BY MOUTH EVERY 4 TO 6 HOURS AS NEEDED FOR WHEEZING OR SHORTNESS OF BREATH  What changed:   · how much to take  · how to take this  · when to take this  · reasons to take this     oxybutynin 5 MG Tab  Commonly known as: DITROPAN  TAKE 1 TABLET BY MOUTH TWICE DAILY( WATCH FOR DRY MOUTH AND CONSTIPATION)  What changed:   · how much to take  · how to take this  · when to take this     sertraline 50 MG tablet  Commonly  known as: ZOLOFT  TAKE 1 TABLET(50 MG) BY MOUTH EVERY NIGHT  What changed: See the new instructions.        CONTINUE taking these medications    aspirin 81 MG EC tablet  Commonly known as: ECOTRIN  Take 1 tablet (81 mg total) by mouth Daily.     B COMPLEX 1 ORAL  Take 1 tablet by mouth Daily.     guaiFENesin 600 mg 12 hr tablet  Commonly known as: MUCINEX  Take 2 tablets (1,200 mg total) by mouth 2 (two) times daily.     multivitamin capsule  Take 1 capsule by mouth once daily.     omeprazole 40 MG capsule  Commonly known as: PRILOSEC  Take 1 capsule (40 mg total) by mouth once daily.     pravastatin 80 MG tablet  Commonly known as: PRAVACHOL  Take 1 tablet (80 mg total) by mouth every evening.        STOP taking these medications    amlodipine-olmesartan 5-20 mg per tablet  Commonly known as: ALICE     ibuprofen 600 MG tablet  Commonly known as: ADVIL,MOTRIN     promethazine-dextromethorphan 6.25-15 mg/5 mL Syrp  Commonly known as: PROMETHAZINE-DM            Indwelling Lines/Drains at time of discharge:   Lines/Drains/Airways     None                 Time spent on the discharge of patient: 35 minutes         Mini Sutherland MD  Department of Hospital Medicine  [facility]

## 2022-05-18 NOTE — CARE UPDATE
05/18/22 1200   Home Oxygen Qualification   $ Home O2 Qualification Pulmonary Stress Test/6 min walk   Room Air SpO2 At Rest (!) 87 %   Room Air SpO2 During Ambulation (!) 87 %   SpO2 During Ambulation on O2 (!) 2 %   Heart Rate on O2 76 bpm   Ambulation O2 LPM 2 LPM   SpO2 Post Ambulation 92 %   Post Ambulation Heart Rate 76 bpm   Post Ambulation O2 LPM 2 LPM   Home O2 Eval Comments Patient requires 2 liters of oxygen for walking

## 2022-05-18 NOTE — PLAN OF CARE
05/18/22 1504   Post-Acute Status   Post-Acute Authorization E   [facility] Status Set-up Complete/Auth obtained   Discharge Plan   Discharge Plan A Home with family   Discharge Plan B Home with family   1 Etank delivered with packet. RT to complete education. Patient and spouse  at bedside received and signed rental agreement.

## 2022-05-18 NOTE — PROGRESS NOTES
Pulmonary/Critical Care Progress Note      PATIENT NAME: Jia Vega  MRN: [id number]  TODAY'S DATE: 2022  10:26 AM  ADMIT DATE: 5/15/2022  AGE: 71 y.o. : 1950        HPI:  The patient is a 71-year-old female who presented to the emergency room with a week of progressive shortness of breath and cough.  She was also complaining of an un quintuple thirst.  She is complaining of bone pain in her legs and lower back.  She was hypotensive and tachycardic on presentation but responded well to a fluid bolus.  She has been given a dose of pamidronate.     The patient is very confused about how long she has been sick.  She tells me she has lost 50 lb but cannot tell me in what time frame.  She tells me she quit smoking but again cannot quantify how long ago that was.  She says she just got tired of smoking.     the patient is grumpy that she is being asked to wake up and sit up.  She states she is no longer having dysuria.  She states she is not clearing any mucus.     the patient is grumpy because she is being stuck repeatedly for blood draws.  She appears ready for discharge but will need to go home on oxygen.    REVIEW OF SYSTEMS  GENERAL: Feeling tired of being stuck.  EYES: Vision is good.  ENT: No sinusitis or pharyngitis.   HEART: No chest pain or palpitations.  LUNGS:  She is not clearing any mucus.  GI: No Nausea, vomiting, constipation, diarrhea, or reflux.  :  Dysuria has resolved  SKIN: No lesions or rashes.  MUSCULOSKELETAL: No joint pain or myalgias.  NEURO: No headaches or neuropathy.  LYMPH: No edema or adenopathy.  PSYCH: No anxiety or depression.  ENDO: No weight change.    No change in the patient's Past Medical History, Past Surgical History, Social History or Family History since admission.      VITAL SIGNS (MOST RECENT)  Temp: 98.5 °F (36.9 °C) (22 07)  Pulse: 80 (22 07)  Resp: 18 (22 0701)  BP: 123/73 (22 0701)  SpO2: (!) 94 % (22  0701)    INTAKE AND OUTPUT (LAST 24 HOURS):    Intake/Output Summary (Last 24 hours) at 5/18/2022 1032  Last data filed at 5/18/2022 0536  Gross per 24 hour   Intake 1100 ml   Output 900 ml   Net 200 ml       WEIGHT  Wt Readings from Last 1 Encounters:   05/17/22 77.9 kg (171 lb 11.8 oz)       PHYSICAL EXAM  GENERAL: Older patient in no distress.   HEENT: Pupils equal and reactive. Extraocular movements intact. Nose intact. Pharynx moist.  Dentures in the maxillary and mandibular location  NECK: Supple.   HEART: Regular rate and rhythm. No murmur or gallop auscultated.  LUNGS:  There are crackles in the right base.  Lung excursion symmetrical. No change in fremitus.  ABDOMEN: Bowel sounds present. Non-tender, no masses palpated.  : Normal anatomy.Purewick in place.  EXTREMITIES: Normal muscle tone and joint movement, no cyanosis or clubbing.   LYMPHATICS: No adenopathy palpated, no edema.  SKIN: Dry, intact, no lesions.  The skin on the feet is very thickened and cracked.  The patient goes barefoot.  NEURO: Cranial nerves II-XII intact. Motor strength 5/5 bilaterally, upper and lower extremities.    PSYCH:  Very grumpy.    CBC LAST (LAST 24 HOURS)  No results for input(s): WBC, RBC, HGB, HCT, MCV, MCH, MCHC, RDW, PLT, MPV, GRAN, LYMPH, MONO, BASO, NRBC in the last 24 hours.    CHEMISTRY LAST (LAST 24 HOURS)  No results for input(s): NA, K, CL, CO2, ANIONGAP, BUN, CREATININE, GLU, CALCIUM, PH, MG, ALBUMIN, PROT, ALKPHOS, ALT, AST, BILITOT in the last 24 hours.      CARDIAC PROFILE (LAST 24 HOURS)  Recent Labs   Lab 05/15/22  1739 05/15/22  1828   BNP 45  --    TROPONINI  --  0.033       LAST 7 DAYS MICROBIOLOGY   Microbiology Results (last 7 days)     Procedure Component Value Units Date/Time    Blood culture x two cultures. Draw prior to antibiotics. [[id number]] Collected: 05/15/22 1740    Order Status: Completed Specimen: Blood from Peripheral, Antecubital, Right Updated: 05/17/22 1832     Blood Culture, Routine  No Growth to date      No Growth to date      No Growth to date    Narrative:      Aerobic and anaerobic    Blood culture x two cultures. Draw prior to antibiotics. [[id number]] Collected: 05/15/22 1745    Order Status: Completed Specimen: Blood from Peripheral, Forearm, Left Updated: 05/17/22 1832     Blood Culture, Routine No Growth to date      No Growth to date      No Growth to date    Narrative:      Aerobic and anaerobic    Urine culture [[id number]]  (Abnormal)  (Susceptibility) Collected: 05/15/22 1816    Order Status: Completed Specimen: Urine Updated: 05/17/22 0635     Urine Culture, Routine ESCHERICHIA COLI  >100,000 cfu/ml      Narrative:      Specimen Source->Urine          MOST RECENT IMAGING  X-Ray Chest AP Portable  Narrative: EXAMINATION:  XR CHEST AP PORTABLE    CLINICAL HISTORY:  Right lower lobe pneumonia;    FINDINGS:  Portable chest at 605 compared with 05/15/2022 shows normal cardiomediastinal silhouette.    Alveolar opacities in right mid and lower lung zone remain unchanged.  Slight prominence of interstitial markings in left lower lung zone unchanged.  Pulmonary vasculature is normal. No new osseous abnormality.  Impression: No significant change.    Electronically signed by: Ezra Schroeder MD  Date:    05/18/2022  Time:    06:34      CURRENT VISIT EKG  Results for orders placed or performed during the hospital encounter of 05/15/22   EKG 12-lead    Narrative    Test Reason : R06.02,    Vent. Rate : 107 BPM     Atrial Rate : 107 BPM     P-R Int : 132 ms          QRS Dur : 078 ms      QT Int : 332 ms       P-R-T Axes : 082 026 017 degrees     QTc Int : 443 ms    Sinus tachycardia with Fusion complexes  Nonspecific ST abnormality  Abnormal ECG  No previous ECGs available    Referred By: [email or web address]   SELF           Confirmed By:              LAST ARTERIAL BLOOD GAS  ABG  Recent Labs   Lab 05/15/22  1743   PH 7.503*   PO2 48   PCO2 46.0*   HCO3 36.1*   BE 13       IMPRESSION AND PLAN  Right lower  lobe pneumonia  Acute hypoxic respiratory failure, improving  Tobacco abuse  Hypercalcemia of unknown source, correcting but still elevated when corrected for albumin  Urinary tract infection, sensitive E coli  Hypokalemia, better      Continue IV fluid, but decrease to 100 cc/hour pending calcium results from today  Oxygen to keep sats greater than 90  Decrease Rocephin to 1 g daily   Add doxycycline 100 mg p.o. b.i.d. for pneumonia  Slow prednisone wean  Chest x-ray in a.m.  Out of bed and ambulate  Trend electrolytes and correct as needed  Continue bronchodilators    I do not object if the patient discharges today.  She should continue a full week of antibiotics and a slow prednisone taper.  She needs follow-up to make sure her hypercalcemia does not recur.  She should not discharge home until her calcium is normal, or nearly so.  She should continue her bronchodilators.  She should follow-up in the office in 2 weeks.  She will need a chest x-ray for her office visit.  The patient will likely need to go home on oxygen.      Kimberly Tellez MD  [facility]  Department of Pulmonology  Date of Service: 05/18/2022  10:27 AM

## 2022-05-18 NOTE — PLAN OF CARE
Problem: Adult Inpatient Plan of Care  Goal: Plan of Care Review  Outcome: Met  Goal: Patient-Specific Goal (Individualized)  Outcome: Met  Goal: Absence of Hospital-Acquired Illness or Injury  Outcome: Met  Goal: Optimal Comfort and Wellbeing  Outcome: Met  Goal: Readiness for Transition of Care  Outcome: Met     Problem: Adjustment to Illness (Sepsis/Septic Shock)  Goal: Optimal Coping  Outcome: Met     Problem: Bleeding (Sepsis/Septic Shock)  Goal: Absence of Bleeding  Outcome: Met     Problem: Glycemic Control Impaired (Sepsis/Septic Shock)  Goal: Blood Glucose Level Within Desired Range  Outcome: Met     Problem: Infection Progression (Sepsis/Septic Shock)  Goal: Absence of Infection Signs and Symptoms  Outcome: Met     Problem: Nutrition Impaired (Sepsis/Septic Shock)  Goal: Optimal Nutrition Intake  Outcome: Met     Problem: Fluid Imbalance (Pneumonia)  Goal: Fluid Balance  Outcome: Met     Problem: Infection (Pneumonia)  Goal: Resolution of Infection Signs and Symptoms  Outcome: Met     Problem: Respiratory Compromise (Pneumonia)  Goal: Effective Oxygenation and Ventilation  Outcome: Met     Problem: Infection  Goal: Absence of Infection Signs and Symptoms  Outcome: Met     Problem: Fall Injury Risk  Goal: Absence of Fall and Fall-Related Injury  Outcome: Met     Problem: Skin Injury Risk Increased  Goal: Skin Health and Integrity  Outcome: Met

## 2022-05-19 NOTE — PLAN OF CARE
05/19/22 0826   Final Note   Assessment Type Final Discharge Note   Anticipated Discharge Disposition Home   Post-Acute Status   Post-Acute Authorization HME   HME Status Set-up Complete/Auth obtained   Discharge Delays None known at this time   Patient dc home with spouse. Oxygen delivered to bedside 1 Etank set up as per Kalpana approval. Patient asked spouse Dami to sign for rental packet agreement. Provided name and address and demographics to Kalpana for concentrator to be delivered to home.   No further needs from MANN.

## 2022-05-20 NOTE — PHYSICIAN QUERY
PT Name: Jia Vega  MR #: [id number]     DOCUMENTATION CLARIFICATION      CDS/: Rena Hsieh               Contact information:[phone] or through epic messenger  This form is a permanent document in the medical record.    Query Date: May 20, 2022    By submitting this query, we are merely seeking further clarification of documentation to reflect the severity of illness of your patient. Please utilize your independent clinical judgment when addressing the question(s) below.     The Medical Record contains the following:   Indicators   Supporting Clinical Findings Location in Medical Record    Documentation/History of condition Severe sepsis  This patient does have evidence of infective focus  My overall impression is sepsis. Vital signs were reviewed and noted in progress note.  Antibiotics given- [facility]  Dr Chun's PNs    Lab Value(s) Lactic Acid 2.9, 1.5, on 5/15  WNL on 5/16    Procalcitonin neg    WBC 18.26 on 5/15 Labs EPIC    Treatment/Medication IV antibiotics     Other She was initially tachycardic and hypotensive, but responded well to the 30 ml/kg NS for sepsis. [facility]-Hospitalist's PNs, DC summary      Provider, please specify the acuity/chronicity of SEPSIS:    [ x  ] Sepsis ruled in    [   ] Sepsis ruled out

## 2022-05-23 NOTE — TELEPHONE ENCOUNTER
----- Message from Patsy Smith sent at 5/23/2022 10:49 AM CDT -----  Regarding: Med question  Pt would like a call back regarding whether she can take Melatonin with all of her current medications.  Please call to advise

## 2022-06-09 NOTE — H&P
Formerly [facility] Medicine History & Physical Examination   Patient Name: Jia Vega  MRN: [id number]  Patient Class: IP- Inpatient   Admission Date: 6/9/2022  9:15 AM  Length of Stay: 0  Attending Physician: Roland Odell MD  Primary Care Provider: Patrick Olson MD  Face-to-Face encounter date: 06/09/2022  Code Status: Full Code  MPOA:  Chief Complaint: Shortness of Breath and Abdominal Pain (Right sided)        HISTORY OF PRESENT ILLNESS:   Jia Vega is a 71 y.o. White female with known history of   71-year-old female with past medical history of GERD asthma COPD chronic active smoker hypertension she was discharged last month due to hypoxia shortness for breath hypercalcemia she was treated with IV fluids, antibiotics, renal consult,  per records was very difficult to treat her in the hospital due to noncompliance refusing treatment, she received antibiotics for possible pneumonia on the right lower lobe of lung due to presence of infiltrate and she was sent home with antibiotics as well it is unclear if patient has been compliant with medications, she was also scheduled to have follow-up with PCP and pulmonology as outpatient for workup for possible malignancy due to presence of significant infiltrate in the right lung with hypercalcemia, at the time of previous hospitalization she was also seen by Nephrology she comes to the ER today because she states that ever since past discharge she still has weakness, is hardly able to get out of the bed and walk, low p.o. intake shortness of breath bilateral back pain,  family states that she has not always been compliant with oxygen home that was arranged at discharge last time. When she arrived to the ER it was noticed that she was hypoxic and required 4 L as cannula to have saturations at 90%, she received LR meropenem vancomyci, DuoNeb, she is going to be admitted to medical floor with telemetry.                       REVIEW OF  SYSTEMS:   10 Point Review of System was performed and was found to be negative except for that mentioned already in the HPI above.     PAST MEDICAL HISTORY:     Past Medical History:   Diagnosis Date    Arthritis     Asthma     Full dentures     GERD (gastroesophageal reflux disease)     Hypertension     Seasonal allergies     Wears glasses        PAST SURGICAL HISTORY:     Past Surgical History:   Procedure Laterality Date    BACK SURGERY      cubital tunnel release  left    HAND SURGERY      right and left: ganglion cyst    HYSTERECTOMY  BSO    NECK SURGERY         ALLERGIES:   Patient has no known allergies.    FAMILY HISTORY:     Family History   Family history unknown: Yes       SOCIAL HISTORY:     Social History     Tobacco Use    Smoking status: Current Every Day Smoker     Packs/day: 0.25     Years: 40.00     Pack years: 10.00     Types: Cigarettes    Smokeless tobacco: Never Used    Tobacco comment: patient states 3 cigs per day   Substance Use Topics    Alcohol use: No     Alcohol/week: 1.7 standard drinks     Types: 2 Standard drinks or equivalent per week        Social History     Substance and Sexual Activity   Sexual Activity Not Currently        HOME MEDICATIONS:     Prior to Admission medications    Medication Sig Start Date End Date Taking? Authorizing Provider   albuterol (PROVENTIL/VENTOLIN HFA) 90 mcg/actuation inhaler INHALE 1 PUFF BY MOUTH EVERY 4 TO 6 HOURS AS NEEDED FOR WHEEZING OR SHORTNESS OF BREATH  Patient taking differently: Inhale 1 puff into the lungs every 4 to 6 hours as needed. INHALE 1 PUFF BY MOUTH EVERY 4 TO 6 HOURS AS NEEDED FOR WHEEZING OR SHORTNESS OF BREATH 5/6/22  Yes Patrick Olson MD   aspirin (ECOTRIN) 81 MG EC tablet Take 1 tablet (81 mg total) by mouth Daily. 10/1/19  Yes Patrick Olson MD   methylPREDNISolone (MEDROL DOSEPACK) 4 mg tablet  6/15/21  Yes Historical Provider   omeprazole (PRILOSEC) 40 MG capsule Take 1 capsule (40 mg total) by mouth once  "daily. 4/28/22  Yes Patrick Olson MD   oxybutynin (DITROPAN) 5 MG Tab TAKE 1 TABLET BY MOUTH TWICE DAILY( WATCH FOR DRY MOUTH AND CONSTIPATION)  Patient taking differently: Take 5 mg by mouth 2 (two) times daily. TAKE 1 TABLET BY MOUTH TWICE DAILY( WATCH FOR DRY MOUTH AND CONSTIPATION) 4/28/22  Yes Patrick Olson MD   pravastatin (PRAVACHOL) 80 MG tablet Take 1 tablet (80 mg total) by mouth every evening. 3/23/22  Yes Patrick Olson MD   predniSONE (DELTASONE) 20 MG tablet  4/28/22  Yes Historical Provider   sertraline (ZOLOFT) 50 MG tablet TAKE 1 TABLET(50 MG) BY MOUTH EVERY NIGHT  Patient taking differently: Take 50 mg by mouth every evening. 3/23/22  Yes Patrick Olson MD   VITAMIN B COMPLEX (B COMPLEX 1 ORAL) Take 1 tablet by mouth Daily.   Yes Historical Provider   guaiFENesin (MUCINEX) 600 mg 12 hr tablet Take 2 tablets (1,200 mg total) by mouth 2 (two) times daily. 4/28/22   Patrick Olson MD   multivitamin capsule Take 1 capsule by mouth once daily.    Historical Provider   amlodipine-olmesartan (ALICE) 5-20 mg per tablet TAKE 1 TABLET BY MOUTH DAILY  Patient taking differently: Take 1 tablet by mouth once daily. 4/29/22 5/18/22  Patrick Olson MD         PHYSICAL EXAM:   /62   Pulse 94   Resp 18   Ht 5' 7" (1.702 m)   Wt 77.6 kg (171 lb)   SpO2 (!) 93%   BMI 26.78 kg/m²   Vitals Reviewed  General appearance: female in no apparent distress.  Skin: No Rash.   Neuro: Motor and sensory exams grossly intact. Good tone. Power in all 4 extremities 5/5.   HENT: Atraumatic head. Moist mucous membranes of oral cavity.  Eyes: Normal extraocular movements.   Neck: Supple. No evidence of lymphadenopathy. No thyroidomegaly.  Lungs: Clear to auscultation bilaterally. No wheezing present. Decreased air entry on right lung, lower field.  Heart: Regular rate and rhythm. S1 and S2 present with no murmurs/gallop/rub. No pedal edema. No JVD present.   Abdomen: Soft, non-distended, non-tender. No rebound " tenderness/guarding. Bowel sounds are normal. Bladder is not palpable.   Extremities: No cyanosis, clubbing.  Psych/mental status: Alert and oriented. Cooperative. Responds appropriately to questions. + Anxious/irritabler.  EMERGENCY DEPARTMENT LABS AND IMAGING:     Labs Reviewed   URINALYSIS, REFLEX TO URINE CULTURE - Abnormal; Notable for the following components:       Result Value    Appearance, UA Hazy (*)     Protein, UA 1+ (*)     Ketones, UA 1+ (*)     All other components within normal limits    Narrative:     Specimen Source->Urine   CBC W/ AUTO DIFFERENTIAL - Abnormal; Notable for the following components:    WBC 14.09 (*)     MCV 79 (*)     MCH 24.9 (*)     MCHC 31.7 (*)     RDW 18.1 (*)     Platelets 471 (*)     MPV 9.1 (*)     Immature Granulocytes 1.6 (*)     Gran # (ANC) 11.9 (*)     Immature Grans (Abs) 0.22 (*)     Gran % 84.5 (*)     Lymph % 7.5 (*)     All other components within normal limits   COMPREHENSIVE METABOLIC PANEL - Abnormal; Notable for the following components:    Sodium 134 (*)     Potassium 3.0 (*)     Chloride 90 (*)     CO2 31 (*)     Glucose 127 (*)     BUN 26 (*)     Calcium 12.6 (*)     Albumin 2.1 (*)     All other components within normal limits    Narrative:     LA critical result(s) repeated. Called and verbal readback obtained   from Natalia Ovalle RN/ED by JBGabriela 06/09/2022 11:49   PROTIME-INR - Abnormal; Notable for the following components:    PT 14.5 (*)     All other components within normal limits   CK - Abnormal; Notable for the following components:    CPK 14 (*)     All other components within normal limits   URINALYSIS MICROSCOPIC - Abnormal; Notable for the following components:    Hyaline Casts, UA 8 (*)     All other components within normal limits    Narrative:     Specimen Source->Urine   D DIMER, QUANTITATIVE - Abnormal; Notable for the following components:    D-Dimer 2.37 (*)     All other components within normal limits    Narrative:     ddim   critical  result(s) called and verbal readback obtained from   aram montiel rn by CAF 06/09/2022 11:28   LACTIC ACID, PLASMA - Abnormal; Notable for the following components:    Lactate (Lactic Acid) 2.0 (*)     All other components within normal limits    Narrative:     Lactic Acid critical result(s) repeated. Called and verbal readback   obtained from Paul Nguyen - [facility].  by CW1 06/09/2022 14:13   CULTURE, BLOOD   CULTURE, BLOOD   APTT   B-TYPE NATRIURETIC PEPTIDE   LIPASE   MAGNESIUM   TROPONIN I   TSH   SARS-COV-2 RNA AMPLIFICATION, QUAL   DRUG SCREEN PANEL, URINE EMERGENCY   DRUG SCREEN PANEL, URINE EMERGENCY   INFLUENZA A AND B ANTIGEN    Narrative:     Specimen Source->Nasopharyngeal Swab   PHOSPHORUS   PHOSPHORUS   TYPE & SCREEN   GROUP & RH       CT Abdomen Pelvis With Contrast   Final Result      CTA Chest Non-Coronary (PE Study)   Final Result      X-Ray Chest 1 View   Final Result      MRI Chest W WO Contrast    (Results Pending)   MRI Abdomen-Pelvis w w/o Contrast (XPD)    (Results Pending)       ASSESSMENT & PLAN:   Jia Vega is a 71 y.o. female admitted for    Hypoxic respiratory failure  Hypercalcemia  CT scan reports worsening of right lower lung infiltrate with pancreatic and inferior polar of the left kidney knee lesion  Persistent right lower lung infiltrate possibly due to malignancy or worsening pneumonia treatment a biotics on last admission  Discharge 1 month ago due to hypoxic respiratory failure with hypercalcemia and right lower lung infiltrate  COPD  Chronic active smoker  GERD  Hypertension  Unclear compliance to treatment                Plan    Admit patient to medical floor with telemetry  DuoNebs scheduled and p.r.n. Solu-Medrol  mg now and then 60 q.8 hours pulmonary consult due to persistent right lung infiltrate with hypercalcemia possible malignancy  Keep vancomycin and Meropenem  MRI of the chest abdomen pelvis with without contrast  On folic acid and vitamin B12  levels  NS at 125 cc/hours repeat BMP in 12 hours nephrology consult to persistent hypercalcemia        Diet: Regular    DVT Prophylaxis: SCD's while in bed and Encourage ambulation. OOB as tolerated.     Discharge Planning and Disposition:  Home with assistance from family    Expected LOS:     This patient is high risk for life-threatening deterioration and death secondary to above comorbidities and need for IV treatment. This patient meets inpatient criteria.   ________________________________________________________________________________    Face-to-Face encounter date: 06/09/2022  Encounter included review of the medical records, interviewing and examining the patient face-to-face, discussion with family and other health care providers including emergency medicine physician, admission orders, interpreting lab/test results and formulating a plan of care.   Medical Decision Making during this encounter was High Complexity due to Patient has a condition that poses threat to life.  ________________________________________________________________________________    INPATIENT LIST OF MEDICATIONS     Current Facility-Administered Medications:     0.9%  NaCl infusion, , Intravenous, Continuous, Roland Odell MD    albuterol-ipratropium 2.5 mg-0.5 mg/3 mL nebulizer solution 3 mL, 3 mL, Nebulization, Q6H WAKE, Roalnd Odell MD    aspirin chewable tablet 81 mg, 81 mg, Oral, Daily, Roland Odell MD    guaiFENesin 12 hr tablet 600 mg, 600 mg, Oral, BID, Roland Odell MD    lactated ringers infusion, , Intravenous, Continuous, Antonina Esposito MD, Last Rate: 130 mL/hr at 06/09/22 1318, New Bag at 06/09/22 1318    lorazepam injection 0.5 mg, 0.5 mg, Intravenous, Once, Roland Odell MD    meropenem-0.9% sodium chloride 1 g/50 mL IVPB, 1 g, Intravenous, Once, Antonina Esposito MD, Last Rate: 16.7 mL/hr at 06/09/22 1319, 1 g at 06/09/22 1319    meropenem-0.9% sodium chloride 1 g/50 mL IVPB, 1 g, Intravenous,  Q8H, Roland Odell MD    methylPREDNISolone sodium succinate injection 125 mg, 125 mg, Intravenous, Once, Roland Odell MD    methylPREDNISolone sodium succinate injection 60 mg, 60 mg, Intravenous, Q8H, Roland Odell MD    multivitamin tablet, 1 tablet, Oral, Daily, Roland Odell MD    [START ON 6/10/2022] pantoprazole EC tablet 40 mg, 40 mg, Oral, Daily, Roland Odell MD    potassium bicarbonate disintegrating tablet 50 mEq, 50 mEq, Oral, Once, Antonina Esposito MD    pravastatin tablet 80 mg, 80 mg, Oral, QHS, Roland Odell MD    sertraline tablet 50 mg, 50 mg, Oral, QHS, Roland Odell MD    sodium chloride 0.9% flush 2 mL, 2 mL, Intravenous, Q6H PRN, Roland Odell MD    Pharmacy to dose Vancomycin consult, , , Once **AND** vancomycin - pharmacy to dose, , Intravenous, pharmacy to manage frequency, Roland Odell MD    vancomycin in dextrose 5 % 1 gram/250 mL IVPB 1,000 mg, 1,000 mg, Intravenous, Once **FOLLOWED BY** vancomycin 500 mg in dextrose 5 % 100 mL IVPB (ready to mix system), 500 mg, Intravenous, Once, Antonina Esposito MD    Current Outpatient Medications:     albuterol (PROVENTIL/VENTOLIN HFA) 90 mcg/actuation inhaler, INHALE 1 PUFF BY MOUTH EVERY 4 TO 6 HOURS AS NEEDED FOR WHEEZING OR SHORTNESS OF BREATH (Patient taking differently: Inhale 1 puff into the lungs every 4 to 6 hours as needed. INHALE 1 PUFF BY MOUTH EVERY 4 TO 6 HOURS AS NEEDED FOR WHEEZING OR SHORTNESS OF BREATH), Disp: 8.5 g, Rfl: 2    aspirin (ECOTRIN) 81 MG EC tablet, Take 1 tablet (81 mg total) by mouth Daily., Disp: 100 tablet, Rfl: 4    methylPREDNISolone (MEDROL DOSEPACK) 4 mg tablet, , Disp: , Rfl:     omeprazole (PRILOSEC) 40 MG capsule, Take 1 capsule (40 mg total) by mouth once daily., Disp: 30 capsule, Rfl: 5    oxybutynin (DITROPAN) 5 MG Tab, TAKE 1 TABLET BY MOUTH TWICE DAILY( WATCH FOR DRY MOUTH AND CONSTIPATION) (Patient taking differently: Take 5 mg by mouth 2 (two) times daily. TAKE  1 TABLET BY MOUTH TWICE DAILY( WATCH FOR DRY MOUTH AND CONSTIPATION)), Disp: 60 tablet, Rfl: 5    pravastatin (PRAVACHOL) 80 MG tablet, Take 1 tablet (80 mg total) by mouth every evening., Disp: 90 tablet, Rfl: 0    predniSONE (DELTASONE) 20 MG tablet, , Disp: , Rfl:     sertraline (ZOLOFT) 50 MG tablet, TAKE 1 TABLET(50 MG) BY MOUTH EVERY NIGHT (Patient taking differently: Take 50 mg by mouth every evening.), Disp: 90 tablet, Rfl: 0    VITAMIN B COMPLEX (B COMPLEX 1 ORAL), Take 1 tablet by mouth Daily., Disp: , Rfl:     guaiFENesin (MUCINEX) 600 mg 12 hr tablet, Take 2 tablets (1,200 mg total) by mouth 2 (two) times daily., Disp: 20 tablet, Rfl: 0    multivitamin capsule, Take 1 capsule by mouth once daily., Disp: , Rfl:       Scheduled Meds:   albuterol-ipratropium  3 mL Nebulization Q6H WAKE    aspirin  81 mg Oral Daily    guaiFENesin  600 mg Oral BID    lorazepam  0.5 mg Intravenous Once    meropenem (MERREM) IVPB  1 g Intravenous Once    meropenem (MERREM) IVPB  1 g Intravenous Q8H    methylPREDNISolone sodium succinate injection  125 mg Intravenous Once    methylPREDNISolone sodium succinate injection  60 mg Intravenous Q8H    multivitamin  1 tablet Oral Daily    [START ON 6/10/2022] pantoprazole  40 mg Oral Daily    potassium bicarbonate  50 mEq Oral Once    pravastatin  80 mg Oral QHS    sertraline  50 mg Oral QHS    vancomycin (VANCOCIN) IVPB  1,000 mg Intravenous Once    Followed by    vancomycin (VANCOCIN) IVPB  500 mg Intravenous Once     Continuous Infusions:   sodium chloride 0.9%      lactated ringers 130 mL/hr at 06/09/22 1318     PRN Meds:.sodium chloride 0.9%, Pharmacy to dose Vancomycin consult **AND** vancomycin - pharmacy to dose      Roland Odell  [facility] Hospitalist  06/09/2022

## 2022-06-09 NOTE — PLAN OF CARE
06/09/22 1137   Patient Assessment/Suction   Level of Consciousness (AVPU) alert   Respiratory Effort Short of breath   Expansion/Accessory Muscles/Retractions no use of accessory muscles   All Lung Fields Breath Sounds Anterior:;Posterior:;diminished   Rhythm/Pattern, Respiratory shortness of breath   Cough Frequency infrequent   Cough Type congested;nonproductive   PRE-TX-O2   O2 Device (Oxygen Therapy) nasal cannula   $ Is the patient on Low Flow Oxygen? Yes   Flow (L/min) 3   SpO2 (!) 93 %   Pulse Oximetry Type Continuous   $ Pulse Oximetry - Multiple Charge Pulse Oximetry - Multiple   Pulse 94   Resp 18   Aerosol Therapy   $ Aerosol Therapy Charges Aerosol Treatment;Initial Continuous   Daily Review of Necessity (SVN) completed   Respiratory Treatment Status (SVN) given   Treatment Route (SVN) mask;oxygen   Patient Position (SVN) HOB elevated   Post Treatment Assessment (SVN) breath sounds unchanged   Signs of Intolerance (SVN) none   Breath Sounds Post-Respiratory Treatment   Post-treatment Heart Rate (beats/min) 93   Post-treatment Resp Rate (breaths/min) 18   Education   $ Education Bronchodilator;15 min

## 2022-06-09 NOTE — ED NOTES
Pt to er via ems with c/o generalized weakness, foul smell of urine x 2 days. Speech clear. Aaox4. Pt reports chronic sob worsening x 2 days. On o2 nc 4L. Skin w/dr/pk. She denies cp, nvd, abd pain, vomiting, constipation. On cm, pulse ox, nibp.

## 2022-06-09 NOTE — PROGRESS NOTES
VANCOMYCIN PHARMACOKINETIC NOTE:  Vancomycin Day # 1    Objective/Assessment:    Diagnosis/Indication for Vancomycin:Pneumonia      71 y.o., female; Actual Body Weight = 77.6 kg (171 lb).    The patient has the following labs:  6/9/2022 Estimated Creatinine Clearance: 80.3 mL/min (based on SCr of 0.7 mg/dL). Lab Results   Component Value Date    BUN 26 (H) 06/09/2022     Lab Results   Component Value Date    WBC 14.09 (H) 06/09/2022          Plan:  Adjust vancomycin dose and/or frequency based on the patient's actual weight and renal function:  Initiate Vancomycin 1250 mg IV every 12 hours, following a loading dose of 1500 mg.  Orders have been entered into patient's chart.        Vancomycin trough level has been ordered for 6/11 @ 0300, prior to 4th dose.     Pharmacy will manage vancomycin therapy, monitor serum vancomycin levels, monitor renal function and adjust regimen as necessary.    Thank you for allowing us to participate in this patient's care.     Graham Golden 6/9/2022   Department of Pharmacy  Ext [phone]

## 2022-06-09 NOTE — SUBJECTIVE & OBJECTIVE
Past Medical History:   Diagnosis Date    Arthritis     Asthma     Full dentures     GERD (gastroesophageal reflux disease)     Hypertension     Seasonal allergies     Wears glasses        Past Surgical History:   Procedure Laterality Date    BACK SURGERY      cubital tunnel release  left    HAND SURGERY      right and left: ganglion cyst    HYSTERECTOMY  BSO    NECK SURGERY         Review of patient's allergies indicates:  No Known Allergies    No current facility-administered medications on file prior to encounter.     Current Outpatient Medications on File Prior to Encounter   Medication Sig    albuterol (PROVENTIL/VENTOLIN HFA) 90 mcg/actuation inhaler INHALE 1 PUFF BY MOUTH EVERY 4 TO 6 HOURS AS NEEDED FOR WHEEZING OR SHORTNESS OF BREATH (Patient taking differently: Inhale 1 puff into the lungs every 4 to 6 hours as needed. INHALE 1 PUFF BY MOUTH EVERY 4 TO 6 HOURS AS NEEDED FOR WHEEZING OR SHORTNESS OF BREATH)    aspirin (ECOTRIN) 81 MG EC tablet Take 1 tablet (81 mg total) by mouth Daily.    methylPREDNISolone (MEDROL DOSEPACK) 4 mg tablet     omeprazole (PRILOSEC) 40 MG capsule Take 1 capsule (40 mg total) by mouth once daily.    oxybutynin (DITROPAN) 5 MG Tab TAKE 1 TABLET BY MOUTH TWICE DAILY( WATCH FOR DRY MOUTH AND CONSTIPATION) (Patient taking differently: Take 5 mg by mouth 2 (two) times daily. TAKE 1 TABLET BY MOUTH TWICE DAILY( WATCH FOR DRY MOUTH AND CONSTIPATION))    pravastatin (PRAVACHOL) 80 MG tablet Take 1 tablet (80 mg total) by mouth every evening.    predniSONE (DELTASONE) 20 MG tablet     sertraline (ZOLOFT) 50 MG tablet TAKE 1 TABLET(50 MG) BY MOUTH EVERY NIGHT (Patient taking differently: Take 50 mg by mouth every evening.)    VITAMIN B COMPLEX (B COMPLEX 1 ORAL) Take 1 tablet by mouth Daily.    guaiFENesin (MUCINEX) 600 mg 12 hr tablet Take 2 tablets (1,200 mg total) by mouth 2 (two) times daily.    multivitamin capsule Take 1 capsule by mouth once daily.    [DISCONTINUED]  amlodipine-olmesartan (ALICE) 5-20 mg per tablet TAKE 1 TABLET BY MOUTH DAILY (Patient taking differently: Take 1 tablet by mouth once daily.)     Family History       Family history is unknown by patient.          Tobacco Use    Smoking status: Current Every Day Smoker     Packs/day: 0.25     Years: 40.00     Pack years: 10.00     Types: Cigarettes    Smokeless tobacco: Never Used    Tobacco comment: patient states 3 cigs per day   Substance and Sexual Activity    Alcohol use: No     Alcohol/week: 1.7 standard drinks     Types: 2 Standard drinks or equivalent per week    Drug use: No    Sexual activity: Not Currently     Review of Systems   Constitutional:  Negative for activity change and appetite change.   HENT:  Negative for congestion and dental problem.    Eyes:  Negative for discharge and itching.   Respiratory:  Negative for shortness of breath.    Cardiovascular:  Negative for chest pain.   Gastrointestinal:  Negative for abdominal distention and abdominal pain.   Endocrine: Negative for cold intolerance.   Genitourinary:  Negative for difficulty urinating and dysuria.   Musculoskeletal:  Negative for arthralgias and back pain.   Skin:  Negative for color change.   Neurological:  Negative for dizziness and facial asymmetry.   Hematological:  Negative for adenopathy.   Psychiatric/Behavioral:  Negative for agitation and behavioral problems.  :  10 point system review pertinent positive negatives present on HPI  Objective:     Vital Signs (Most Recent):  Pulse: 94 (06/09/22 1137)  Resp: 18 (06/09/22 1137)  BP: 122/62 (06/09/22 0937)  SpO2: (!) 93 % (06/09/22 1137)   Vital Signs (24h Range):  Pulse:  [] 94  Resp:  [13-20] 18  SpO2:  [93 %-96 %] 93 %  BP: (122)/(62) 122/62     Weight: 77.6 kg (171 lb)  Body mass index is 26.78 kg/m².    Physical Exam  Vitals and nursing note reviewed.   Constitutional:       General: She is not in acute distress.  HENT:      Head: Normocephalic and atraumatic.      Nose:  Nose normal.      Mouth/Throat:      Mouth: Mucous membranes are moist.   Eyes:      Pupils: Pupils are equal, round, and reactive to light.   Cardiovascular:      Rate and Rhythm: Normal rate.   Pulmonary:      Effort: Pulmonary effort is normal.      Comments: Decrease air entry on right side.  Abdominal:      General: There is no distension.   Musculoskeletal:         General: Normal range of motion.      Cervical back: Normal range of motion.   Skin:     General: Skin is warm.   Neurological:      General: No focal deficit present.      Mental Status: She is alert and oriented to person, place, and time.   Psychiatric:         Behavior: Behavior normal.         CRANIAL NERVES     CN III, IV, VI   Pupils are equal, round, and reactive to light.     Significant Labs: All pertinent labs within the past 24 hours have been reviewed.  CBC:   Recent Labs   Lab 06/09/22  1040 06/10/22  0511 06/10/22  0945   WBC 14.09* 14.54*  --    HGB 13.4 12.2  --    HCT 42.3 38.5 38   * 425  --      CMP:   Recent Labs   Lab 06/09/22  1040 06/09/22  2125 06/10/22  0511   * 131* 132*   K 3.0* 3.5 3.3*   CL 90* 90* 91*   CO2 31* 32* 33*   * 144* 156*   BUN 26* 22 23   CREATININE 0.7 0.6 0.6   CALCIUM 12.6* 11.5* 11.8*   PROT 7.0  --   --    ALBUMIN 2.1*  --   --    BILITOT 0.9  --   --    ALKPHOS 125  --   --    AST 29  --   --    ALT 19  --   --    ANIONGAP 13 9 8   EGFRNONAA >60.0 >60.0 >60.0       Significant Imaging: I have reviewed all pertinent imaging results/findings within the past 24 hours.

## 2022-06-09 NOTE — ED TRIAGE NOTES
Admit per [facility] to ED5, 70yo female with c/o SOB, generalized weakness right sided abdominal pain for 2-3 days.  reported to EMS that she has not wanted to get off of sofa for the last 2 days.

## 2022-06-09 NOTE — CONSULTS
Nephrology Consult Note        Patient Name: Jia Vega  MRN: [id number]    Patient Class: IP- Inpatient   Admission Date: 6/9/2022  Length of Stay: 0 days  Date of Service: 6/9/2022    Attending Physician: Antonina Esposito MD  Primary Care Provider: Patrick Olson MD    Reason for Consult: hypercalcemia/hyponatremia/hypokalemia    SUBJECTIVE:     HPI: 71-year-old female who was admitted a few weeks ago with hypercalcemia with low PTH and what appeared to be a right lower lobe pneumonia. Treated as hypercalcemia of malignancy, received bisphosphonate.The patient was supposed to follow up after 2 weeks after her antibiotics with a new chest x-ray to see if infiltration cleared but she did not. She returns today complaining of more shortness of breath and now her CT clearly shows a very large mass with necrotic areas and adenopathy. She is again hypercalcemic. She received again therapy with bisphosphonate and IVF.    Past Medical History:   Diagnosis Date    Arthritis     Asthma     Full dentures     GERD (gastroesophageal reflux disease)     Hypertension     Seasonal allergies     Wears glasses      Past Surgical History:   Procedure Laterality Date    BACK SURGERY      cubital tunnel release  left    HAND SURGERY      right and left: ganglion cyst    HYSTERECTOMY  BSO    NECK SURGERY       Family History   Family history unknown: Yes     Social History     Tobacco Use    Smoking status: Current Every Day Smoker     Packs/day: 0.25     Years: 40.00     Pack years: 10.00     Types: Cigarettes    Smokeless tobacco: Never Used    Tobacco comment: patient states 3 cigs per day   Substance Use Topics    Alcohol use: No     Alcohol/week: 1.7 standard drinks     Types: 2 Standard drinks or equivalent per week    Drug use: No       Review of patient's allergies indicates:  No Known Allergies    Outpatient meds:  No current facility-administered medications on file prior to encounter.     Current  Outpatient Medications on File Prior to Encounter   Medication Sig Dispense Refill    albuterol (PROVENTIL/VENTOLIN HFA) 90 mcg/actuation inhaler INHALE 1 PUFF BY MOUTH EVERY 4 TO 6 HOURS AS NEEDED FOR WHEEZING OR SHORTNESS OF BREATH (Patient taking differently: Inhale 1 puff into the lungs every 4 to 6 hours as needed. INHALE 1 PUFF BY MOUTH EVERY 4 TO 6 HOURS AS NEEDED FOR WHEEZING OR SHORTNESS OF BREATH) 8.5 g 2    aspirin (ECOTRIN) 81 MG EC tablet Take 1 tablet (81 mg total) by mouth Daily. 100 tablet 4    methylPREDNISolone (MEDROL DOSEPACK) 4 mg tablet       omeprazole (PRILOSEC) 40 MG capsule Take 1 capsule (40 mg total) by mouth once daily. 30 capsule 5    oxybutynin (DITROPAN) 5 MG Tab TAKE 1 TABLET BY MOUTH TWICE DAILY( WATCH FOR DRY MOUTH AND CONSTIPATION) (Patient taking differently: Take 5 mg by mouth 2 (two) times daily. TAKE 1 TABLET BY MOUTH TWICE DAILY( WATCH FOR DRY MOUTH AND CONSTIPATION)) 60 tablet 5    pravastatin (PRAVACHOL) 80 MG tablet Take 1 tablet (80 mg total) by mouth every evening. 90 tablet 0    predniSONE (DELTASONE) 20 MG tablet       sertraline (ZOLOFT) 50 MG tablet TAKE 1 TABLET(50 MG) BY MOUTH EVERY NIGHT (Patient taking differently: Take 50 mg by mouth every evening.) 90 tablet 0    VITAMIN B COMPLEX (B COMPLEX 1 ORAL) Take 1 tablet by mouth Daily.      guaiFENesin (MUCINEX) 600 mg 12 hr tablet Take 2 tablets (1,200 mg total) by mouth 2 (two) times daily. 20 tablet 0    multivitamin capsule Take 1 capsule by mouth once daily.      [DISCONTINUED] amlodipine-olmesartan (ALICE) 5-20 mg per tablet TAKE 1 TABLET BY MOUTH DAILY (Patient taking differently: Take 1 tablet by mouth once daily.) 90 tablet 1       Scheduled meds:   albuterol-ipratropium  3 mL Nebulization Q6H WAKE    aspirin  81 mg Oral Daily    guaiFENesin  600 mg Oral BID    lorazepam  0.5 mg Intravenous Once    meropenem (MERREM) IVPB  1 g Intravenous Q8H    methylPREDNISolone sodium succinate injection   60 mg Intravenous Q8H    multivitamin  1 tablet Oral Daily    [START ON 6/10/2022] pantoprazole  40 mg Oral Daily    pravastatin  80 mg Oral QHS    sertraline  50 mg Oral QHS    [START ON 6/10/2022] vancomycin (VANCOCIN) IVPB  1,250 mg Intravenous Q12H    vancomycin (VANCOCIN) IVPB  1,000 mg Intravenous Once    Followed by    vancomycin (VANCOCIN) IVPB  500 mg Intravenous Once       Infusions:   sodium chloride 0.9%      lactated ringers 130 mL/hr at 06/09/22 1318       PRN meds:  sodium chloride 0.9%, Pharmacy to dose Vancomycin consult **AND** vancomycin - pharmacy to dose    Review of Systems:  Review of Systems   Constitutional: Positive for malaise/fatigue. Negative for chills, fever and weight loss.   HENT: Negative for hearing loss and nosebleeds.    Eyes: Negative for blurred vision, double vision and photophobia.   Respiratory: Positive for shortness of breath. Negative for cough and wheezing.    Cardiovascular: Negative for chest pain, palpitations and leg swelling.   Gastrointestinal: Positive for nausea. Negative for abdominal pain, constipation, diarrhea, heartburn and vomiting.   Genitourinary: Negative for dysuria, frequency and urgency.   Musculoskeletal: Negative for falls, joint pain and myalgias.   Skin: Negative for itching and rash.   Neurological: Positive for weakness. Negative for dizziness, speech change, focal weakness, loss of consciousness and headaches.   Endo/Heme/Allergies: Does not bruise/bleed easily.   Psychiatric/Behavioral: Negative for depression and substance abuse. The patient is not nervous/anxious.      OBJECTIVE:     Vital Signs and IO (Last 24H):  Pulse:  []   Resp:  [13-20]   BP: (110-129)/(55-77)   SpO2:  [88 %-96 %]   No intake/output data recorded.    Wt Readings from Last 5 Encounters:   06/09/22 77.6 kg (171 lb)   05/17/22 77.9 kg (171 lb 11.8 oz)   04/28/22 83.5 kg (184 lb)   05/25/21 93.9 kg (207 lb 0.2 oz)   05/07/21 93.9 kg (207 lb)     Physical  Exam:  Physical Exam  Constitutional:       Appearance: She is well-developed. She is not diaphoretic.   HENT:      Head: Normocephalic and atraumatic.   Eyes:      General: No scleral icterus.     Pupils: Pupils are equal, round, and reactive to light.   Cardiovascular:      Rate and Rhythm: Normal rate and regular rhythm.   Pulmonary:      Effort: Pulmonary effort is normal. No respiratory distress.      Breath sounds: No stridor.   Abdominal:      General: There is no distension.      Palpations: Abdomen is soft.   Musculoskeletal:         General: No deformity. Normal range of motion.      Cervical back: Neck supple.   Skin:     General: Skin is warm and dry.      Findings: No erythema or rash.   Neurological:      Mental Status: She is alert and oriented to person, place, and time.      Cranial Nerves: No cranial nerve deficit.   Psychiatric:         Behavior: Behavior normal.       Body mass index is 26.78 kg/m².    Laboratory:  Recent Labs   Lab 06/09/22  1040   *   K 3.0*   CL 90*   CO2 31*   BUN 26*   CREATININE 0.7   ESTGFRAFRICA >60.0   EGFRNONAA >60.0   *       Recent Labs   Lab 06/09/22  1040   CALCIUM 12.6*   ALBUMIN 2.1*   PHOS 2.6*   MG 2.0       Recent Labs   Lab 05/16/22  0421   PTH, Intact 4.7 L       Recent Labs   Lab 06/09/22  1040   WBC 14.09*   HGB 13.4   HCT 42.3   *   MCV 79*   MCHC 31.7*   MONO 6.1  0.9       Recent Labs   Lab 06/09/22  1040   BILITOT 0.9   PROT 7.0   ALBUMIN 2.1*   ALKPHOS 125   ALT 19   AST 29       Recent Labs   Lab 05/15/22  1816 06/09/22  0955   Color, UA Yellow Yellow   Appearance, UA Cloudy A Hazy A   pH, UA 6.0 6.0   Specific Gravity, UA 1.025 1.020   Protein, UA 2+ A 1+ A   Glucose, UA Negative Negative   Ketones, UA Negative 1+ A   Urobilinogen, UA 1.0 Negative   Bilirubin (UA) 1+ A Negative   Occult Blood UA 3+ A Negative   Nitrite, UA Negative Negative   RBC, UA 9 H 2   WBC, UA >100 H 2   Bacteria Occasional Rare   Hyaline Casts, UA 2370 A 8  A       Microbiology Results (last 7 days)     Procedure Component Value Units Date/Time    Blood culture #2 **CANNOT BE ORDERED STAT** [[id number]] Collected: 06/09/22 1320    Order Status: Sent Specimen: Blood from Peripheral, Forearm, Left Updated: 06/09/22 1349    Blood culture #1 **CANNOT BE ORDERED STAT** [[id number]] Collected: 06/09/22 1305    Order Status: Sent Specimen: Blood from Peripheral, Hand, Right Updated: 06/09/22 1319        ASSESSMENT/PLAN:     Hypercalcemia due to advanced lung malignancy  Hyponatremia due to SIADH?  Hypokalemia due to poor oral intake?  Anemia  No NSAIDs, ACEI/ARB, IV contrast or other nephrotoxins.  Keep MAP > 60, SBP > 100.  Dose meds for GFR < 30 ml/min.  Renal diet - low K, low phos.  Stop IVF, agree with steroids and abx.  Check urine lytes and osmolality.  Hgb and HCT are acceptable. Monitor.No GENE.    Thank you for allowing us to participate in the care of your patient!   We will follow the patient and provide recommendations as needed.    Patient care time was spent personally by me on the following activities:   · Obtaining a history.  · Examination of patient.  · Providing medical care at the patients bedside.  · Developing a treatment plan with patient or surrogate and bedside caregivers.  · Ordering and reviewing laboratory studies, radiographic studies, pulse oximetry.  · Ordering and performing treatments and interventions.  · Evaluation of patient's response to treatment.  · Discussions with consultants while on the unit and immediately available to the patient.  · Re-evaluation of the patient's condition.  · Documentation in the medical record.     Damian Don MD    [facility]  [address] [place] [address] [place] [address]    [phone] - tel  [phone] - fax    6/9/2022

## 2022-06-09 NOTE — ED PROVIDER NOTES
Encounter Date: 6/9/2022       History     Chief Complaint   Patient presents with    Shortness of Breath    Abdominal Pain     Right sided     This is a 71-year-old female who presents complaining of malaise, fatigue shortness of breath and generalized nonfocal weakness.  The patient was recently admitted to the hospital with a diagnosis of COPD.  She was discharged home on oxygen.  Since being home her  reports that she has been not compliant with taking medications or the recommended care.  He states that she has mainly been sleeping and has not been interested in trying to get better the patient reports she just has felt too fatigued to continue and resume her activities of daily living.  She reports getting very short of breath with any sort of exertion or activity.  She reports nonfocal generalized fatigue and malaise.  She reports coughing and shortness of breath.  She has had a decreased appetite with nausea but no vomiting.  She denies any headaches or visual changes.  She denies fever or chills.  She denies gastrointestinal bleeding.  She has intermittent crampy mild right lower abdominal pain that is not currently present.  She denies any other problems or complaints.        Review of patient's allergies indicates:  No Known Allergies  Past Medical History:   Diagnosis Date    Arthritis     Asthma     Full dentures     GERD (gastroesophageal reflux disease)     Hypertension     Seasonal allergies     Wears glasses      Past Surgical History:   Procedure Laterality Date    BACK SURGERY      cubital tunnel release  left    HAND SURGERY      right and left: ganglion cyst    HYSTERECTOMY  BSO    NECK SURGERY       Family History   Family history unknown: Yes     Social History     Tobacco Use    Smoking status: Current Every Day Smoker     Packs/day: 0.25     Years: 40.00     Pack years: 10.00     Types: Cigarettes    Smokeless tobacco: Never Used    Tobacco comment: patient states 3  cigs per day   Substance Use Topics    Alcohol use: No     Alcohol/week: 1.7 standard drinks     Types: 2 Standard drinks or equivalent per week    Drug use: No     Review of Systems   Constitutional: Positive for activity change, appetite change and fatigue. Negative for fever.   HENT: Negative.    Eyes: Negative.    Respiratory: Positive for cough and shortness of breath.    Cardiovascular: Negative.  Negative for chest pain and leg swelling.   Gastrointestinal: Positive for abdominal pain. Negative for abdominal distention, anal bleeding, blood in stool, diarrhea, nausea, rectal pain and vomiting.   Genitourinary: Negative.  Negative for difficulty urinating and flank pain.   Musculoskeletal: Positive for arthralgias and myalgias. Negative for back pain, neck pain and neck stiffness.   Skin: Negative.    Neurological: Positive for light-headedness. Negative for tremors, seizures, syncope, facial asymmetry, speech difficulty, numbness and headaches.   Psychiatric/Behavioral: Positive for decreased concentration and sleep disturbance (Has been sleeping much more than normal). Negative for dysphoric mood, hallucinations, self-injury and suicidal ideas. The patient is not hyperactive.    All other systems reviewed and are negative.      Physical Exam     Initial Vitals   BP Pulse Resp Temp SpO2   06/09/22 0937 06/09/22 0930 06/09/22 0937 06/09/22 2226 06/09/22 0930   122/62 110 13 98.3 °F (36.8 °C) 96 %      MAP       --                Physical Exam    Nursing note and vitals reviewed.  Constitutional: She is cooperative. She has a sickly appearance. She appears ill. No distress.   Appears chronically but not acutely ill   HENT:   Head: Normocephalic and atraumatic.   Right Ear: Tympanic membrane normal.   Left Ear: Tympanic membrane normal.   Nose: Nose normal.   Mouth/Throat: Uvula is midline, oropharynx is clear and moist and mucous membranes are normal. No oral lesions. No uvula swelling. No oropharyngeal  exudate, posterior oropharyngeal edema or posterior oropharyngeal erythema.   Eyes: Conjunctivae, EOM and lids are normal. Pupils are equal, round, and reactive to light. No scleral icterus.   Neck: Trachea normal and phonation normal. Neck supple. No thyroid mass and no thyromegaly present. No stridor present. No JVD present.   Normal range of motion.   Full passive range of motion without pain.     Cardiovascular: Normal rate, regular rhythm, normal heart sounds, intact distal pulses and normal pulses. Exam reveals no gallop and no friction rub.    No murmur heard.  Pulmonary/Chest: Effort normal. No accessory muscle usage or stridor. No tachypnea. No respiratory distress. She has decreased breath sounds. She has wheezes. She has no rhonchi. She has no rales.   Abdominal: Abdomen is soft. Bowel sounds are normal. She exhibits no distension, no pulsatile midline mass and no mass. There is abdominal tenderness in the suprapubic area. No hernia.   No right CVA tenderness.  No left CVA tenderness. There is no rigidity, no rebound and no guarding. negative psoas sign and negative Rovsing's sign  Musculoskeletal:         General: No tenderness or edema. Normal range of motion.      Right hand: Normal. Normal range of motion. Normal strength. Normal sensation. Normal capillary refill. Normal pulse.      Left hand: Normal. Normal range of motion. Normal strength. Normal sensation. Normal capillary refill. Normal pulse.      Cervical back: Normal, full passive range of motion without pain, normal range of motion and neck supple. No edema, erythema, rigidity or bony tenderness. No spinous process tenderness or muscular tenderness. Normal range of motion.      Thoracic back: Normal. No bony tenderness. Normal range of motion.      Lumbar back: Normal. No bony tenderness. Normal range of motion.      Right foot: Normal. Normal range of motion and normal capillary refill. No tenderness or bony tenderness. Normal pulse.       Left foot: Normal. Normal range of motion and normal capillary refill. No tenderness or bony tenderness. Normal pulse.      Comments: Pulses are 2+ throughout, cap refill is less than 2 sec throughout, extremities are nontender throughout with full range of motion. There is no spinal tenderness to palpation.     Neurological: She is alert and oriented to person, place, and time. She has normal strength. She displays normal reflexes. No cranial nerve deficit or sensory deficit. GCS score is 15. GCS eye subscore is 4. GCS verbal subscore is 5. GCS motor subscore is 6.   No focal deficits.   Skin: Skin is warm, dry and intact. Capillary refill takes less than 2 seconds. No ecchymosis, no petechiae and no rash noted. No erythema. No pallor.   Psychiatric: She has a normal mood and affect. Her speech is normal and behavior is normal. Judgment and thought content normal. Cognition and memory are normal.         ED Course   Procedures  Labs Reviewed   URINALYSIS, REFLEX TO URINE CULTURE - Abnormal; Notable for the following components:       Result Value    Appearance, UA Hazy (*)     Protein, UA 1+ (*)     Ketones, UA 1+ (*)     All other components within normal limits    Narrative:     Specimen Source->Urine   CBC W/ AUTO DIFFERENTIAL - Abnormal; Notable for the following components:    WBC 14.09 (*)     MCV 79 (*)     MCH 24.9 (*)     MCHC 31.7 (*)     RDW 18.1 (*)     Platelets 471 (*)     MPV 9.1 (*)     Immature Granulocytes 1.6 (*)     Gran # (ANC) 11.9 (*)     Immature Grans (Abs) 0.22 (*)     Gran % 84.5 (*)     Lymph % 7.5 (*)     All other components within normal limits   COMPREHENSIVE METABOLIC PANEL - Abnormal; Notable for the following components:    Sodium 134 (*)     Potassium 3.0 (*)     Chloride 90 (*)     CO2 31 (*)     Glucose 127 (*)     BUN 26 (*)     Calcium 12.6 (*)     Albumin 2.1 (*)     All other components within normal limits    Narrative:     LA critical result(s) repeated. Called and verbal  readback obtained   from Natalia Ovalle RN/ED by JB8 06/09/2022 11:49   PROTIME-INR - Abnormal; Notable for the following components:    PT 14.5 (*)     All other components within normal limits   CK - Abnormal; Notable for the following components:    CPK 14 (*)     All other components within normal limits   URINALYSIS MICROSCOPIC - Abnormal; Notable for the following components:    Hyaline Casts, UA 8 (*)     All other components within normal limits    Narrative:     Specimen Source->Urine   D DIMER, QUANTITATIVE - Abnormal; Notable for the following components:    D-Dimer 2.37 (*)     All other components within normal limits    Narrative:     ddim   critical result(s) called and verbal readback obtained from   aram montiel rn by CAF 06/09/2022 11:28   LACTIC ACID, PLASMA - Abnormal; Notable for the following components:    Lactate (Lactic Acid) 2.0 (*)     All other components within normal limits    Narrative:     Lactic Acid critical result(s) repeated. Called and verbal readback   obtained from Paul Layton RN ER.  by CW1 06/09/2022 14:13   PHOSPHORUS - Abnormal; Notable for the following components:    Phosphorus 2.6 (*)     All other components within normal limits   PTH, INTACT - Abnormal; Notable for the following components:    PTH, Intact 3.7 (*)     All other components within normal limits   APTT   B-TYPE NATRIURETIC PEPTIDE   LIPASE   MAGNESIUM   TROPONIN I   TSH   SARS-COV-2 RNA AMPLIFICATION, QUAL   DRUG SCREEN PANEL, URINE EMERGENCY   DRUG SCREEN PANEL, URINE EMERGENCY   INFLUENZA A AND B ANTIGEN    Narrative:     Specimen Source->Nasopharyngeal Swab   PHOSPHORUS   PTH, INTACT   TYPE & SCREEN   GROUP & RH        ECG Results          EKG 12-lead (In process)  Result time 06/09/22 10:44:52    In process by Interface, Lab In [facility] (06/09/22 10:44:52)                 Narrative:    Test Reason : R06.02,    Vent. Rate : 096 BPM     Atrial Rate : 096 BPM     P-R Int : 126 ms          QRS Dur : 086  ms      QT Int : 368 ms       P-R-T Axes : 080 045 029 degrees     QTc Int : 464 ms    Normal sinus rhythm  Normal ECG  When compared with ECG of 15-MAY-2022 17:42,  Fusion complexes are no longer Present    Referred By: [email or web address]   SELF           Confirmed By:                             Imaging Results          CT Abdomen Pelvis With Contrast (Final result)  Result time 06/09/22 13:02:26    Final result by Kevon Oden MD (06/09/22 13:02:26)                 Narrative:    CMS MANDATED QUALITY DATA - CT RADIATION  436    All CT scans at this facility utilize dose modulation, iterative reconstruction, and/or weight based dosing when appropriate to reduce radiation dose to as low as reasonably achievable.    CLINICAL HISTORY:  71 years (1950) Female Pulmonary embolism (PE) suspected, high prob    TECHNIQUE:  CT CHEST ANGIOGRAPHY WITH IV CONTRAST, CT ABDOMEN PELVIS WITH IV CONTRAST.  482+261 images obtained. Axial CT examination of the chest with attention to the pulmonary arteries was performed using contiguous axial images from the diaphragms to the lung apices following the intravenous administration of non-ionic contrast material.  Images were reviewed using lung, mediastinal, and bone windows. The study was performed with thin sections with subsequent 3-D reformations, mid and reconstructions at multiple planes with images were stored in the PACS system. Subsequently axial CT of the abdomen and pelvis was obtained.    CONTRAST:  IV contrast was administered uneventfully.    COMPARISON:  CT from May 16, 2022.    FINDINGS:  CTA PE evaluation: This is a moderate quality study for the evaluation of pulmonary embolism secondary to motion artifact. The pulmonary arteries are normal in appearance without pulmonary emboli noted up to the segmental level, noting the limitations of CT technique for identifying small or isolated subsegmental emboli.    CT Chest:  Visualized neck: Within normal limits.  Lungs:  Consolidative airspace opacity is seen in the high acute finding a majority of the right lower lobe. There is moderate, upper lobe, centrilobular predominant emphysematous lung disease. The left lung shows only minimal dependent atelectasis.  Airway: There is airway opacification in the right lower lobe suggesting a combination of mucous/secretions, debris and/or aspiration.  Pleura: There is no pleural effusion. There is no pneumothorax.  Cardiovascular: The heart is normal in size. There are small calcified plaques at the aortic arch.  Mediastinum: Lymphadenopathy is present with index nodes outlined below:  -15 x 13 mm pretracheal node (image 72)  -19 x 18 mm precarinal node (image 100)  -23 x 21 mm right hilar node (image 118)  -28 x 19 mm subcarinal node (image 121)  Soft tissues: The peripheral soft tissues appear normal.  Musculoskeletal: The visualized osseous structures appear normal.  There are no suspicious osseous lesions.  Esophagus: The esophagus appears grossly normal.    CT Abdomen:  Liver: The liver is normal in size. There is a 7 mm rounded low-attenuation structure in the hepatic dome possibly representing a small cyst (image 33) partially obscured by motion artifact. There is relative diffuse low-attenuation of the liver suggesting steatosis.  Gallbladder: The gallbladder is within normal limits.  Biliary Tree: No intra or extrahepatic ductal dilation.  Spleen: Within normal limits.  Pancreas: There is a 9 x 6 mm enhancing nodule along the anterior body of the spleen (image 73). No pancreatic ductal dilation is seen. There is mild diffuse fatty infiltration of the pancreas.  Adrenal Glands: Nonspecific bilateral adrenal thickening is seen, similar to the previous exam.  Kidneys: No hydronephrosis, hydroureter or evidence of obstructive uropathy. There is a 7 mm simple cyst in the upper pole the right kidney (image 62). There is a 1.9 cm indeterminate rounded isoattenuating structure in the  inferior pole the left kidney (image 107), this measure simple fluid.  Vasculature: Scattered calcified plaques throughout the distal abdominal aorta and iliacs with no aneurysm.  Lymph nodes: No abdominal lymphadenopathy is seen.  Intraperitoneal structures: There is no ascites.  Bowel: Moderate volume of stool and gas in the distal colon with a nonobstructive bowel gas pattern. Consider correlation for constipation.  Abdominal wall: Small fat-containing left inguinal hernia.  Musculoskeletal: There is degenerative disc disease and facet arthropathy in the lumbar spine with no aggressive appearing lytic or blastic lesions. There appears to been a laminectomy at L2-L3, with grade 1 anterolisthesis of L2 on L3. There is a partially sacralized L5 vertebral body which is sacralized of the transverse process on the left.    CT Pelvis:  Bladder: The urinary bladder is within normal limits.  Reproductive Organs: The uterus is not visualized/surgically absent.  Pelvic Lymph nodes: No pelvic lymphadenopathy or pelvic mass is identified.    IMPRESSION:  1. No evidence of pulmonary embolism to the segmental arterial level. If there is continued clinical concern, consider further evaluation with lower extremity doppler.    2. Interval worsening consolidative airspace opacity in the right lower lobe suggestive of pneumonia. Consider radiographic follow-up in 6-8 weeks after treatment to document resolution (as radiographic findings may persist beyond clinical symptoms and underlying malignancy is not excluded).    3. Mediastinal lymphadenopathy with index nodes outlined above similar to the previous exam.    4. Moderate nonspecific bilateral adrenal thickening.    5. Indeterminate enhancing nodule along the anterior margin of the pancreatic body, and indeterminate rounded isoattenuating structure along the inferior pole of the left kidney, possibly representing complex/complicated cysts, lymph nodes or neoplasm, consider  nonemergent follow-up contrast enhanced MRI.    6. Moderate volume of stool and gas in the colon with a nonobstructive bowel gas pattern, consider correlation for constipation.    7. Numerous additional, and incidental findings as above.                    .    Electronically signed by:  Kevon Oden MD  6/9/2022 1:02 PM CDT Workstation: [id number]                             CTA Chest Non-Coronary (PE Study) (Final result)  Result time 06/09/22 13:02:26    Final result by Kevon Oden MD (06/09/22 13:02:26)                 Narrative:    CMS MANDATED QUALITY DATA - CT RADIATION  436    All CT scans at this facility utilize dose modulation, iterative reconstruction, and/or weight based dosing when appropriate to reduce radiation dose to as low as reasonably achievable.    CLINICAL HISTORY:  71 years (1950) Female Pulmonary embolism (PE) suspected, high prob    TECHNIQUE:  CT CHEST ANGIOGRAPHY WITH IV CONTRAST, CT ABDOMEN PELVIS WITH IV CONTRAST.  482+261 images obtained. Axial CT examination of the chest with attention to the pulmonary arteries was performed using contiguous axial images from the diaphragms to the lung apices following the intravenous administration of non-ionic contrast material.  Images were reviewed using lung, mediastinal, and bone windows. The study was performed with thin sections with subsequent 3-D reformations, mid and reconstructions at multiple planes with images were stored in the PACS system. Subsequently axial CT of the abdomen and pelvis was obtained.    CONTRAST:  IV contrast was administered uneventfully.    COMPARISON:  CT from May 16, 2022.    FINDINGS:  CTA PE evaluation: This is a moderate quality study for the evaluation of pulmonary embolism secondary to motion artifact. The pulmonary arteries are normal in appearance without pulmonary emboli noted up to the segmental level, noting the limitations of CT technique for identifying small or isolated subsegmental  emboli.    CT Chest:  Visualized neck: Within normal limits.  Lungs: Consolidative airspace opacity is seen in the high acute finding a majority of the right lower lobe. There is moderate, upper lobe, centrilobular predominant emphysematous lung disease. The left lung shows only minimal dependent atelectasis.  Airway: There is airway opacification in the right lower lobe suggesting a combination of mucous/secretions, debris and/or aspiration.  Pleura: There is no pleural effusion. There is no pneumothorax.  Cardiovascular: The heart is normal in size. There are small calcified plaques at the aortic arch.  Mediastinum: Lymphadenopathy is present with index nodes outlined below:  -15 x 13 mm pretracheal node (image 72)  -19 x 18 mm precarinal node (image 100)  -23 x 21 mm right hilar node (image 118)  -28 x 19 mm subcarinal node (image 121)  Soft tissues: The peripheral soft tissues appear normal.  Musculoskeletal: The visualized osseous structures appear normal.  There are no suspicious osseous lesions.  Esophagus: The esophagus appears grossly normal.    CT Abdomen:  Liver: The liver is normal in size. There is a 7 mm rounded low-attenuation structure in the hepatic dome possibly representing a small cyst (image 33) partially obscured by motion artifact. There is relative diffuse low-attenuation of the liver suggesting steatosis.  Gallbladder: The gallbladder is within normal limits.  Biliary Tree: No intra or extrahepatic ductal dilation.  Spleen: Within normal limits.  Pancreas: There is a 9 x 6 mm enhancing nodule along the anterior body of the spleen (image 73). No pancreatic ductal dilation is seen. There is mild diffuse fatty infiltration of the pancreas.  Adrenal Glands: Nonspecific bilateral adrenal thickening is seen, similar to the previous exam.  Kidneys: No hydronephrosis, hydroureter or evidence of obstructive uropathy. There is a 7 mm simple cyst in the upper pole the right kidney (image 62). There is  a 1.9 cm indeterminate rounded isoattenuating structure in the inferior pole the left kidney (image 107), this measure simple fluid.  Vasculature: Scattered calcified plaques throughout the distal abdominal aorta and iliacs with no aneurysm.  Lymph nodes: No abdominal lymphadenopathy is seen.  Intraperitoneal structures: There is no ascites.  Bowel: Moderate volume of stool and gas in the distal colon with a nonobstructive bowel gas pattern. Consider correlation for constipation.  Abdominal wall: Small fat-containing left inguinal hernia.  Musculoskeletal: There is degenerative disc disease and facet arthropathy in the lumbar spine with no aggressive appearing lytic or blastic lesions. There appears to been a laminectomy at L2-L3, with grade 1 anterolisthesis of L2 on L3. There is a partially sacralized L5 vertebral body which is sacralized of the transverse process on the left.    CT Pelvis:  Bladder: The urinary bladder is within normal limits.  Reproductive Organs: The uterus is not visualized/surgically absent.  Pelvic Lymph nodes: No pelvic lymphadenopathy or pelvic mass is identified.    IMPRESSION:  1. No evidence of pulmonary embolism to the segmental arterial level. If there is continued clinical concern, consider further evaluation with lower extremity doppler.    2. Interval worsening consolidative airspace opacity in the right lower lobe suggestive of pneumonia. Consider radiographic follow-up in 6-8 weeks after treatment to document resolution (as radiographic findings may persist beyond clinical symptoms and underlying malignancy is not excluded).    3. Mediastinal lymphadenopathy with index nodes outlined above similar to the previous exam.    4. Moderate nonspecific bilateral adrenal thickening.    5. Indeterminate enhancing nodule along the anterior margin of the pancreatic body, and indeterminate rounded isoattenuating structure along the inferior pole of the left kidney, possibly representing  complex/complicated cysts, lymph nodes or neoplasm, consider nonemergent follow-up contrast enhanced MRI.    6. Moderate volume of stool and gas in the colon with a nonobstructive bowel gas pattern, consider correlation for constipation.    7. Numerous additional, and incidental findings as above.                    .    Electronically signed by:  Kevon Oden MD  6/9/2022 1:02 PM CDT Workstation: [id number]                             X-Ray Chest 1 View (Final result)  Result time 06/09/22 11:24:00    Final result by Pauline Rodriguez MD (06/09/22 11:24:00)                 Narrative:    Portable chest x-ray at 10:51 AM is compared to prior study of 5/18/2010    Clinical history shortness of breath    Cardiomediastinal silhouette is normal in size.    There is stable alveolar consolidation in the right mid and lower lung compatible with pneumonia. There are faint reticular nodular opacities in the left lung base. The upper lobes are clear. There is been prior cervical fusion.    IMPRESSION: Stable alveolar consolidation in the right mid and lower lung with increasing reticulonodular opacities in left lung base suggestive of pneumonia.    Electronically signed by:  Pauline Rodriguez MD  6/9/2022 11:24 AM CDT Workstation: [id number]                               Medications   sodium chloride 0.9% flush 2 mL (has no administration in time range)   vancomycin - pharmacy to dose (has no administration in time range)   meropenem-0.9% sodium chloride 1 g/50 mL IVPB (0 g Intravenous Stopped 6/10/22 1736)   multivitamin tablet (1 tablet Oral Given 6/10/22 1443)   aspirin chewable tablet 81 mg (81 mg Oral Given 6/10/22 1443)   guaiFENesin 12 hr tablet 600 mg (600 mg Oral Given 6/10/22 2005)   pantoprazole EC tablet 40 mg (40 mg Oral Given 6/10/22 1443)   pravastatin tablet 80 mg (80 mg Oral Given 6/10/22 2006)   vancomycin 1.25 g in dextrose 5% 250 mL IVPB (ready to mix) (1,250 mg Intravenous Trough Due As Scheduled  Before Dose 6/11/22 0300)   methylPREDNISolone sodium succinate injection 40 mg (40 mg Intravenous Given 6/10/22 1315)   melatonin tablet 6 mg (6 mg Oral Given 6/10/22 2006)   sodium chloride 0.9% flush 10 mL (has no administration in time range)   albuterol nebulizer solution (2.5 mg Nebulization Given 6/10/22 0854)   LIDOcaine HCL 4% external solution (5 mLs Tracheal Tube Given 6/10/22 0855)   potassium chloride SA CR tablet 20 mEq (20 mEq Oral Given 6/10/22 2006)   albuterol-ipratropium 2.5 mg-0.5 mg/3 mL nebulizer solution 3 mL (has no administration in time range)   levalbuterol nebulizer solution 0.63 mg (0.63 mg Nebulization Given 6/9/22 1137)   ipratropium 0.02 % nebulizer solution 0.5 mg (0.5 mg Nebulization Given 6/9/22 1137)   budesonide nebulizer solution 0.5 mg (0.5 mg Nebulization Given 6/9/22 1137)   meropenem-0.9% sodium chloride 1 g/50 mL IVPB (0 g Intravenous Stopped 6/9/22 1419)   iohexoL (OMNIPAQUE 350) injection 100 mL (100 mLs Intravenous Given 6/9/22 1234)   potassium bicarbonate disintegrating tablet 50 mEq (50 mEq Oral Given 6/9/22 1450)   vancomycin in dextrose 5 % 1 gram/250 mL IVPB 1,000 mg (0 mg Intravenous Stopped 6/9/22 1629)     Followed by   vancomycin 500 mg in dextrose 5 % 100 mL IVPB (ready to mix system) (0 mg Intravenous Stopped 6/9/22 1630)   methylPREDNISolone sodium succinate injection 125 mg (125 mg Intravenous Given 6/9/22 1450)   pamidronate (AREDIA) 90 mg in sodium chloride 0.9% 250 mL infusion (0 mg Intravenous Stopped 6/9/22 2308)     Medical Decision Making:   Clinical Tests:   Lab Tests: Reviewed  Radiological Study: Reviewed  Medical Tests: Reviewed  ED Management:  Pneumonia noted on CT scan.  CT scan of the abdomen pelvis reviewed.  Blood cultures obtained.  IV antibiotics given.  Breathing treatments have been given.  She is not in any acute distress and is not currently exhibiting evidence of shock or hypoperfusion.  Calcium noted to be elevated--malignancy and  differential diagnosis.  Patient also with diffuse nonfocal weakness after prolonged hospitalization with likely deconditioning as another partial etiology to her symptoms.  I have discussed the case with the hospitalist provider who has assumed care will admit.                      Clinical Impression:   Final diagnoses:  [R06.02] SOB (shortness of breath)  [R06.02] Shortness of breath  [J18.9] Pneumonia due to infectious organism, unspecified laterality, unspecified part of lung (Primary)          ED Disposition Condition    Admit               Antonina Esposito MD  06/10/22 3222

## 2022-06-09 NOTE — CONSULTS
Pulmonary/Critical Care Consult      PATIENT NAME: Jia Vega  MRN: [id number]  TODAY'S DATE: 2022  4:32 PM  ADMIT DATE: 2022  AGE: 71 y.o. : 1950    CONSULT REQUESTED BY: Antonina Esposito MD    REASON FOR CONSULT:   Bronchogenic carcinoma    HPI:  Patient is a 71-year-old female who was admitted a few weeks ago with hypercalcemia what appeared to be a right lower lobe pneumonia.  The patient was supposed to follow up after 2 weeks after her antibiotics with a new chest x-ray to see if cm infiltrating cleared but did not.  She returns today complaining of more shortness of breath and now her CT clearly shows a very large mass with necrotic areas and adenopathy.  She is again hypercalcemic.  When asked why the patient in follow-up in the office, she states she for got.  She states she is not resume smoking.  She just does not have a taste for it any longer.    REVIEW OF SYSTEMS  GENERAL: Feeling weak.  EYES: Vision is good.  ENT: No sinusitis or pharyngitis.   HEART: No chest pain or palpitations.  LUNGS:  She is coughing continuously.  GI: No Nausea, vomiting, constipation, diarrhea, or reflux.  : No dysuria, hesitancy, or nocturia.  SKIN: No lesions or rashes.  MUSCULOSKELETAL: No joint pain or myalgias.  NEURO: No headaches or neuropathy.  LYMPH: No edema or adenopathy.  PSYCH: No anxiety or depression.  ENDO: No weight change.    ALLERGIES  Review of patient's allergies indicates:  No Known Allergies    INPATIENT SCHEDULED MEDICATIONS   albuterol-ipratropium  3 mL Nebulization Q6H WAKE    aspirin  81 mg Oral Daily    guaiFENesin  600 mg Oral BID    lorazepam  0.5 mg Intravenous Once    meropenem (MERREM) IVPB  1 g Intravenous Q8H    methylPREDNISolone sodium succinate injection  60 mg Intravenous Q8H    multivitamin  1 tablet Oral Daily    [START ON 6/10/2022] pantoprazole  40 mg Oral Daily    pravastatin  80 mg Oral QHS    sertraline  50 mg Oral QHS    [START ON 6/10/2022]  vancomycin (VANCOCIN) IVPB  1,250 mg Intravenous Q12H      sodium chloride 0.9%      lactated ringers 130 mL/hr at 06/09/22 1318       MEDICAL AND SURGICAL HISTORY  Past Medical History:   Diagnosis Date    Arthritis     Asthma     Full dentures     GERD (gastroesophageal reflux disease)     Hypertension     Seasonal allergies     Wears glasses      Past Surgical History:   Procedure Laterality Date    BACK SURGERY      cubital tunnel release  left    HAND SURGERY      right and left: ganglion cyst    HYSTERECTOMY  BSO    NECK SURGERY         ALCOHOL, TOBACCO AND DRUG USE  Social History     Tobacco Use   Smoking Status Current Every Day Smoker    Packs/day: 0.25    Years: 40.00    Pack years: 10.00    Types: Cigarettes   Smokeless Tobacco Never Used   Tobacco Comment    patient states 3 cigs per day     Social History     Substance and Sexual Activity   Alcohol Use No    Alcohol/week: 1.7 standard drinks    Types: 2 Standard drinks or equivalent per week     Social History     Substance and Sexual Activity   Drug Use No       FAMILY HISTORY  Family History   Family history unknown: Yes       VITAL SIGNS (MOST RECENT)  Pulse: 89 (06/09/22 1530)  Resp: 18 (06/09/22 1137)  BP: 135/75 (06/09/22 1530)  SpO2: (!) 91 % (06/09/22 1530)    INTAKE AND OUTPUT (LAST 24 HOURS):    Intake/Output Summary (Last 24 hours) at 6/9/2022 1632  Last data filed at 6/9/2022 1630  Gross per 24 hour   Intake 400 ml   Output --   Net 400 ml       WEIGHT  Wt Readings from Last 1 Encounters:   06/09/22 77.6 kg (171 lb)       PHYSICAL EXAM  GENERAL: Older patient in no distress.  HEENT: Pupils equal and reactive. Extraocular movements intact. Nose intact. Pharynx moist.  NECK: Supple.   HEART: Regular rate and rhythm. No murmur or gallop auscultated.  LUNGS:  There are no breath sounds in the right base or the right lower anterior thorax either.. Lung excursion symmetrical. No change in fremitus.   ABDOMEN: Bowel sounds  present. Non-tender, no masses palpated.  : Normal anatomy.  EXTREMITIES: Normal muscle tone and joint movement, no cyanosis or clubbing.   LYMPHATICS: No adenopathy palpated, no edema.  SKIN: Dry, intact, no lesions.   NEURO: Cranial nerves II-XII intact. Motor strength 5/5 bilaterally, upper and lower extremities.  PSYCH: Appropriate affect    ACUTE PHASE REACTANT (LAST 24 HOURS)  Recent Labs   Lab 06/09/22  1040   DDIMER 2.37*       CBC LAST (LAST 24 HOURS)  Recent Labs   Lab 06/09/22  1040   WBC 14.09*   RBC 5.38   HGB 13.4   HCT 42.3   MCV 79*   MCH 24.9*   MCHC 31.7*   RDW 18.1*   *   MPV 9.1*   GRAN 84.5*  11.9*   LYMPH 7.5*  1.1   MONO 6.1  0.9   BASO 0.03   NRBC 0       CHEMISTRY LAST (LAST 24 HOURS)  Recent Labs   Lab 06/09/22  1040   *   K 3.0*   CL 90*   CO2 31*   ANIONGAP 13   BUN 26*   CREATININE 0.7   *   CALCIUM 12.6*   MG 2.0   ALBUMIN 2.1*   PROT 7.0   ALKPHOS 125   ALT 19   AST 29   BILITOT 0.9    Corrected calcium 14.32    COAGULATION LAST (LAST 24 HOURS)  Recent Labs   Lab 06/09/22  1040   LABPT 14.5*   INR 1.2   APTT 26.9       CARDIAC PROFILE (LAST 24 HOURS)  Recent Labs   Lab 06/09/22  1040   BNP 94   CPK 14*   TROPONINI 0.037       LAST 7 DAYS MICROBIOLOGY   Microbiology Results (last 7 days)     Procedure Component Value Units Date/Time    Blood culture #2 **CANNOT BE ORDERED STAT** [[id number]] Collected: 06/09/22 1320    Order Status: Sent Specimen: Blood from Peripheral, Forearm, Left Updated: 06/09/22 1349    Blood culture #1 **CANNOT BE ORDERED STAT** [[id number]] Collected: 06/09/22 1305    Order Status: Sent Specimen: Blood from Peripheral, Hand, Right Updated: 06/09/22 1319          MOST RECENT IMAGING  CT Abdomen Pelvis With Contrast  CMS MANDATED QUALITY DATA - CT RADIATION  436    All CT scans at this facility utilize dose modulation, iterative reconstruction, and/or weight based dosing when appropriate to reduce radiation dose to as low as reasonably  achievable.    CLINICAL HISTORY:  71 years (1950) Female Pulmonary embolism (PE) suspected, high prob    TECHNIQUE:  CT CHEST ANGIOGRAPHY WITH IV CONTRAST, CT ABDOMEN PELVIS WITH IV CONTRAST.  482+261 images obtained. Axial CT examination of the chest with attention to the pulmonary arteries was performed using contiguous axial images from the diaphragms to the lung apices following the intravenous administration of non-ionic contrast material.  Images were reviewed using lung, mediastinal, and bone windows. The study was performed with thin sections with subsequent 3-D reformations, mid and reconstructions at multiple planes with images were stored in the PACS system. Subsequently axial CT of the abdomen and pelvis was obtained.    CONTRAST:  IV contrast was administered uneventfully.    COMPARISON:  CT from May 16, 2022.    FINDINGS:  CTA PE evaluation: This is a moderate quality study for the evaluation of pulmonary embolism secondary to motion artifact. The pulmonary arteries are normal in appearance without pulmonary emboli noted up to the segmental level, noting the limitations of CT technique for identifying small or isolated subsegmental emboli.    CT Chest:  Visualized neck: Within normal limits.  Lungs: Consolidative airspace opacity is seen in the high acute finding a majority of the right lower lobe. There is moderate, upper lobe, centrilobular predominant emphysematous lung disease. The left lung shows only minimal dependent atelectasis.  Airway: There is airway opacification in the right lower lobe suggesting a combination of mucous/secretions, debris and/or aspiration.  Pleura: There is no pleural effusion. There is no pneumothorax.  Cardiovascular: The heart is normal in size. There are small calcified plaques at the aortic arch.  Mediastinum: Lymphadenopathy is present with index nodes outlined below:  -15 x 13 mm pretracheal node (image 72)  -19 x 18 mm precarinal node (image 100)  -23 x 21 mm  right hilar node (image 118)  -28 x 19 mm subcarinal node (image 121)  Soft tissues: The peripheral soft tissues appear normal.  Musculoskeletal: The visualized osseous structures appear normal.  There are no suspicious osseous lesions.  Esophagus: The esophagus appears grossly normal.    CT Abdomen:  Liver: The liver is normal in size. There is a 7 mm rounded low-attenuation structure in the hepatic dome possibly representing a small cyst (image 33) partially obscured by motion artifact. There is relative diffuse low-attenuation of the liver suggesting steatosis.  Gallbladder: The gallbladder is within normal limits.  Biliary Tree: No intra or extrahepatic ductal dilation.  Spleen: Within normal limits.  Pancreas: There is a 9 x 6 mm enhancing nodule along the anterior body of the spleen (image 73). No pancreatic ductal dilation is seen. There is mild diffuse fatty infiltration of the pancreas.  Adrenal Glands: Nonspecific bilateral adrenal thickening is seen, similar to the previous exam.  Kidneys: No hydronephrosis, hydroureter or evidence of obstructive uropathy. There is a 7 mm simple cyst in the upper pole the right kidney (image 62). There is a 1.9 cm indeterminate rounded isoattenuating structure in the inferior pole the left kidney (image 107), this measure simple fluid.  Vasculature: Scattered calcified plaques throughout the distal abdominal aorta and iliacs with no aneurysm.  Lymph nodes: No abdominal lymphadenopathy is seen.  Intraperitoneal structures: There is no ascites.  Bowel: Moderate volume of stool and gas in the distal colon with a nonobstructive bowel gas pattern. Consider correlation for constipation.  Abdominal wall: Small fat-containing left inguinal hernia.  Musculoskeletal: There is degenerative disc disease and facet arthropathy in the lumbar spine with no aggressive appearing lytic or blastic lesions. There appears to been a laminectomy at L2-L3, with grade 1 anterolisthesis of L2 on  L3. There is a partially sacralized L5 vertebral body which is sacralized of the transverse process on the left.    CT Pelvis:  Bladder: The urinary bladder is within normal limits.  Reproductive Organs: The uterus is not visualized/surgically absent.  Pelvic Lymph nodes: No pelvic lymphadenopathy or pelvic mass is identified.    IMPRESSION:  1. No evidence of pulmonary embolism to the segmental arterial level. If there is continued clinical concern, consider further evaluation with lower extremity doppler.    2. Interval worsening consolidative airspace opacity in the right lower lobe suggestive of pneumonia. Consider radiographic follow-up in 6-8 weeks after treatment to document resolution (as radiographic findings may persist beyond clinical symptoms and underlying malignancy is not excluded).    3. Mediastinal lymphadenopathy with index nodes outlined above similar to the previous exam.    4. Moderate nonspecific bilateral adrenal thickening.    5. Indeterminate enhancing nodule along the anterior margin of the pancreatic body, and indeterminate rounded isoattenuating structure along the inferior pole of the left kidney, possibly representing complex/complicated cysts, lymph nodes or neoplasm, consider nonemergent follow-up contrast enhanced MRI.    6. Moderate volume of stool and gas in the colon with a nonobstructive bowel gas pattern, consider correlation for constipation.    7. Numerous additional, and incidental findings as above.    .    Electronically signed by:  Kevon Oden MD  6/9/2022 1:02 PM CDT Workstation: [id number]  CTA Chest Non-Coronary (PE Study)  CMS MANDATED QUALITY DATA - CT RADIATION  436    All CT scans at this facility utilize dose modulation, iterative reconstruction, and/or weight based dosing when appropriate to reduce radiation dose to as low as reasonably achievable.    CLINICAL HISTORY:  71 years (1950) Female Pulmonary embolism (PE) suspected, high prob    TECHNIQUE:  CT  CHEST ANGIOGRAPHY WITH IV CONTRAST, CT ABDOMEN PELVIS WITH IV CONTRAST.  482+261 images obtained. Axial CT examination of the chest with attention to the pulmonary arteries was performed using contiguous axial images from the diaphragms to the lung apices following the intravenous administration of non-ionic contrast material.  Images were reviewed using lung, mediastinal, and bone windows. The study was performed with thin sections with subsequent 3-D reformations, mid and reconstructions at multiple planes with images were stored in the PACS system. Subsequently axial CT of the abdomen and pelvis was obtained.    CONTRAST:  IV contrast was administered uneventfully.    COMPARISON:  CT from May 16, 2022.    FINDINGS:  CTA PE evaluation: This is a moderate quality study for the evaluation of pulmonary embolism secondary to motion artifact. The pulmonary arteries are normal in appearance without pulmonary emboli noted up to the segmental level, noting the limitations of CT technique for identifying small or isolated subsegmental emboli.    CT Chest:  Visualized neck: Within normal limits.  Lungs: Consolidative airspace opacity is seen in the high acute finding a majority of the right lower lobe. There is moderate, upper lobe, centrilobular predominant emphysematous lung disease. The left lung shows only minimal dependent atelectasis.  Airway: There is airway opacification in the right lower lobe suggesting a combination of mucous/secretions, debris and/or aspiration.  Pleura: There is no pleural effusion. There is no pneumothorax.  Cardiovascular: The heart is normal in size. There are small calcified plaques at the aortic arch.  Mediastinum: Lymphadenopathy is present with index nodes outlined below:  -15 x 13 mm pretracheal node (image 72)  -19 x 18 mm precarinal node (image 100)  -23 x 21 mm right hilar node (image 118)  -28 x 19 mm subcarinal node (image 121)  Soft tissues: The peripheral soft tissues appear  normal.  Musculoskeletal: The visualized osseous structures appear normal.  There are no suspicious osseous lesions.  Esophagus: The esophagus appears grossly normal.    CT Abdomen:  Liver: The liver is normal in size. There is a 7 mm rounded low-attenuation structure in the hepatic dome possibly representing a small cyst (image 33) partially obscured by motion artifact. There is relative diffuse low-attenuation of the liver suggesting steatosis.  Gallbladder: The gallbladder is within normal limits.  Biliary Tree: No intra or extrahepatic ductal dilation.  Spleen: Within normal limits.  Pancreas: There is a 9 x 6 mm enhancing nodule along the anterior body of the spleen (image 73). No pancreatic ductal dilation is seen. There is mild diffuse fatty infiltration of the pancreas.  Adrenal Glands: Nonspecific bilateral adrenal thickening is seen, similar to the previous exam.  Kidneys: No hydronephrosis, hydroureter or evidence of obstructive uropathy. There is a 7 mm simple cyst in the upper pole the right kidney (image 62). There is a 1.9 cm indeterminate rounded isoattenuating structure in the inferior pole the left kidney (image 107), this measure simple fluid.  Vasculature: Scattered calcified plaques throughout the distal abdominal aorta and iliacs with no aneurysm.  Lymph nodes: No abdominal lymphadenopathy is seen.  Intraperitoneal structures: There is no ascites.  Bowel: Moderate volume of stool and gas in the distal colon with a nonobstructive bowel gas pattern. Consider correlation for constipation.  Abdominal wall: Small fat-containing left inguinal hernia.  Musculoskeletal: There is degenerative disc disease and facet arthropathy in the lumbar spine with no aggressive appearing lytic or blastic lesions. There appears to been a laminectomy at L2-L3, with grade 1 anterolisthesis of L2 on L3. There is a partially sacralized L5 vertebral body which is sacralized of the transverse process on the left.    CT  Pelvis:  Bladder: The urinary bladder is within normal limits.  Reproductive Organs: The uterus is not visualized/surgically absent.  Pelvic Lymph nodes: No pelvic lymphadenopathy or pelvic mass is identified.    IMPRESSION:  1. No evidence of pulmonary embolism to the segmental arterial level. If there is continued clinical concern, consider further evaluation with lower extremity doppler.    2. Interval worsening consolidative airspace opacity in the right lower lobe suggestive of pneumonia. Consider radiographic follow-up in 6-8 weeks after treatment to document resolution (as radiographic findings may persist beyond clinical symptoms and underlying malignancy is not excluded).    3. Mediastinal lymphadenopathy with index nodes outlined above similar to the previous exam.    4. Moderate nonspecific bilateral adrenal thickening.    5. Indeterminate enhancing nodule along the anterior margin of the pancreatic body, and indeterminate rounded isoattenuating structure along the inferior pole of the left kidney, possibly representing complex/complicated cysts, lymph nodes or neoplasm, consider nonemergent follow-up contrast enhanced MRI.    6. Moderate volume of stool and gas in the colon with a nonobstructive bowel gas pattern, consider correlation for constipation.    7. Numerous additional, and incidental findings as above.    .    Electronically signed by:  Kevon Oden MD  6/9/2022 1:02 PM CDT Workstation: [id number]  X-Ray Chest 1 View  Portable chest x-ray at 10:51 AM is compared to prior study of 5/18/2010    Clinical history shortness of breath    Cardiomediastinal silhouette is normal in size.    There is stable alveolar consolidation in the right mid and lower lung compatible with pneumonia. There are faint reticular nodular opacities in the left lung base. The upper lobes are clear. There is been prior cervical fusion.    IMPRESSION: Stable alveolar consolidation in the right mid and lower lung with  increasing reticulonodular opacities in left lung base suggestive of pneumonia.    Electronically signed by:  Pauline Rodriguez MD  6/9/2022 11:24 AM CDT Workstation: [id number]      CURRENT VISIT EKG  Results for orders placed or performed during the hospital encounter of 06/09/22   EKG 12-lead    Narrative    Test Reason : R06.02,    Vent. Rate : 096 BPM     Atrial Rate : 096 BPM     P-R Int : 126 ms          QRS Dur : 086 ms      QT Int : 368 ms       P-R-T Axes : 080 045 029 degrees     QTc Int : 464 ms    Normal sinus rhythm  Normal ECG  When compared with ECG of 15-MAY-2022 17:42,  Fusion complexes are no longer Present    Referred By: [email or web address]   SELF           Confirmed By:        ECHOCARDIOGRAM RESULTS  No results found for this or any previous visit.              LAST ARTERIAL BLOOD GAS  ABG  No results for input(s): PH, PO2, PCO2, HCO3, BE in the last 168 hours.    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  Hypercalcemia  COPD  Some component of postobstructive pneumonia?    Bronchoscopy in a.m. if the patient is willing to be treated for cancer.  She will think about this overnight and let me know.  Pamidronate 90mg x 1  Bronchodilators  Oxygen to keep sats 90% or better  Narrow antibiotics soon    Kimberly Tellez MD  [facility]  Department of Pulmonology  Date of Service: 06/09/2022  4:32 PM

## 2022-06-09 NOTE — HPI
71-year-old female with past medical history of GERD asthma COPD chronic active smoker hypertension she was discharged last month due to hypoxia shortness for breath hypercalcemia she was treated with IV fluids pulmonary the rate consult on per records was very difficult to treat her in the hospital due to noncompliance refusing treatment she received antibiotics for possible pneumonia on the right lower lobe of lung due to presence of infiltrate and she was sent home with antibiotics as well it is unclear if patient has been compliant with medications she was also scheduled to have follow-up with PCP and pulmonology as outpatient for workup for possible malignancy due to presence of significant infiltrate in the right lung with hypercalcemia at the time of previous hospitalization she was also seen by Nephrology she comes to the ER today because she states that ever since past discharge she still has weakness his Itz is able to get out of the bed and walk low p.o. intake shortness of breath beyond the back bilaterally family states that she has not always been compliant with oxygen home that was arranged at discharge last time when she arrived to the ER it was noticed that she was hypoxic and required 4 L as cannula to have saturations at 90% whole cause was slightly elevated she received LR meropenem vancomycin would did reside DuoNeb she is going to be admitted to medical floor with telemetry.

## 2022-06-10 PROBLEM — E87.1 HYPONATREMIA: Status: ACTIVE | Noted: 2022-01-01

## 2022-06-10 PROBLEM — C34.90 LUNG CANCER: Status: ACTIVE | Noted: 2022-01-01

## 2022-06-10 PROBLEM — E83.52 HYPERCALCEMIA: Status: ACTIVE | Noted: 2022-01-01

## 2022-06-10 PROBLEM — Z91.199 NONCOMPLIANCE: Status: ACTIVE | Noted: 2022-01-01

## 2022-06-10 NOTE — CARE UPDATE
06/10/22 0806   Patient Assessment/Suction   Level of Consciousness (AVPU) alert   Respiratory Effort Normal;Unlabored   All Lung Fields Breath Sounds diminished   PRE-TX-O2   O2 Device (Oxygen Therapy) nasal cannula   $ Is the patient on Low Flow Oxygen? Yes   Flow (L/min) 7   SpO2 (!) 92 %   Pulse Oximetry Type Intermittent   $ Pulse Oximetry - Multiple Charge Pulse Oximetry - Multiple   Pulse 82   Resp 20   Aerosol Therapy   $ Aerosol Therapy Charges Aerosol Treatment   Daily Review of Necessity (SVN) completed   Respiratory Treatment Status (SVN) given   Treatment Route (SVN) mask;oxygen   Patient Position (SVN) HOB elevated   Post Treatment Assessment (SVN) breath sounds unchanged   Signs of Intolerance (SVN) none   Education   $ Education Bronchodilator;15 min   Respiratory Evaluation   $ Care Plan Tech Time 15 min     Called to bedside for low spo2 pt requiring increase in oxygen to maintain spo2. Aerosol tx. Given. Pt resting comfortably. No distress noted. Pt has dry nonproductive cough.

## 2022-06-10 NOTE — NURSING
Patient came from ED on strecher had a wet dirty diaper pulled patient over ED did not give medicine that patient needed got patient cleaned up and situated  went home patient had no more requests other than to have melatonin

## 2022-06-10 NOTE — PROGRESS NOTES
[facility] Medicine  Progress Note    Patient Name: Jia Vega  MRN: [id number]  Patient Class: IP- Inpatient   Admission Date: 6/9/2022  Length of Stay: 1 days  Attending Physician: Norm Leon MD  Primary Care Provider: Patrick Olson MD        Subjective:     Principal Problem:Acute hypoxemic respiratory failure        HPI:  71-year-old female with past medical history of GERD asthma COPD chronic active smoker hypertension she was discharged last month due to hypoxia shortness for breath hypercalcemia she was treated with IV fluids pulmonary the rate consult on per records was very difficult to treat her in the hospital due to noncompliance refusing treatment she received antibiotics for possible pneumonia on the right lower lobe of lung due to presence of infiltrate and she was sent home with antibiotics as well it is unclear if patient has been compliant with medications she was also scheduled to have follow-up with PCP and pulmonology as outpatient for workup for possible malignancy due to presence of significant infiltrate in the right lung with hypercalcemia at the time of previous hospitalization she was also seen by Nephrology she comes to the ER today because she states that ever since past discharge she still has weakness his Itz is able to get out of the bed and walk low p.o. intake shortness of breath beyond the back bilaterally family states that she has not always been compliant with oxygen home that was arranged at discharge last time when she arrived to the ER it was noticed that she was hypoxic and required 4 L as cannula to have saturations at 90% whole cause was slightly elevated she received LR meropenem vancomycin would did reside DuoNeb she is going to be admitted to medical floor with telemetry.      Overview/Hospital Course:  06/10  Had Bronchoscopy today  Overall poor idea about her health condition and whats going on       Past Medical History:   Diagnosis  Date    Arthritis     Asthma     Full dentures     GERD (gastroesophageal reflux disease)     Hypertension     Seasonal allergies     Wears glasses        Past Surgical History:   Procedure Laterality Date    BACK SURGERY      cubital tunnel release  left    HAND SURGERY      right and left: ganglion cyst    HYSTERECTOMY  BSO    NECK SURGERY         Review of patient's allergies indicates:  No Known Allergies    No current facility-administered medications on file prior to encounter.     Current Outpatient Medications on File Prior to Encounter   Medication Sig    albuterol (PROVENTIL/VENTOLIN HFA) 90 mcg/actuation inhaler INHALE 1 PUFF BY MOUTH EVERY 4 TO 6 HOURS AS NEEDED FOR WHEEZING OR SHORTNESS OF BREATH (Patient taking differently: Inhale 1 puff into the lungs every 4 to 6 hours as needed. INHALE 1 PUFF BY MOUTH EVERY 4 TO 6 HOURS AS NEEDED FOR WHEEZING OR SHORTNESS OF BREATH)    aspirin (ECOTRIN) 81 MG EC tablet Take 1 tablet (81 mg total) by mouth Daily.    methylPREDNISolone (MEDROL DOSEPACK) 4 mg tablet     omeprazole (PRILOSEC) 40 MG capsule Take 1 capsule (40 mg total) by mouth once daily.    oxybutynin (DITROPAN) 5 MG Tab TAKE 1 TABLET BY MOUTH TWICE DAILY( WATCH FOR DRY MOUTH AND CONSTIPATION) (Patient taking differently: Take 5 mg by mouth 2 (two) times daily. TAKE 1 TABLET BY MOUTH TWICE DAILY( WATCH FOR DRY MOUTH AND CONSTIPATION))    pravastatin (PRAVACHOL) 80 MG tablet Take 1 tablet (80 mg total) by mouth every evening.    predniSONE (DELTASONE) 20 MG tablet     sertraline (ZOLOFT) 50 MG tablet TAKE 1 TABLET(50 MG) BY MOUTH EVERY NIGHT (Patient taking differently: Take 50 mg by mouth every evening.)    VITAMIN B COMPLEX (B COMPLEX 1 ORAL) Take 1 tablet by mouth Daily.    guaiFENesin (MUCINEX) 600 mg 12 hr tablet Take 2 tablets (1,200 mg total) by mouth 2 (two) times daily.    multivitamin capsule Take 1 capsule by mouth once daily.    [DISCONTINUED] amlodipine-olmesartan  (ALICE) 5-20 mg per tablet TAKE 1 TABLET BY MOUTH DAILY (Patient taking differently: Take 1 tablet by mouth once daily.)     Family History       Family history is unknown by patient.          Tobacco Use    Smoking status: Current Every Day Smoker     Packs/day: 0.25     Years: 40.00     Pack years: 10.00     Types: Cigarettes    Smokeless tobacco: Never Used    Tobacco comment: patient states 3 cigs per day   Substance and Sexual Activity    Alcohol use: No     Alcohol/week: 1.7 standard drinks     Types: 2 Standard drinks or equivalent per week    Drug use: No    Sexual activity: Not Currently     Review of Systems   Constitutional:  Negative for activity change and appetite change.   HENT:  Negative for congestion and dental problem.    Eyes:  Negative for discharge and itching.   Respiratory:  Negative for shortness of breath.    Cardiovascular:  Negative for chest pain.   Gastrointestinal:  Negative for abdominal distention and abdominal pain.   Endocrine: Negative for cold intolerance.   Genitourinary:  Negative for difficulty urinating and dysuria.   Musculoskeletal:  Negative for arthralgias and back pain.   Skin:  Negative for color change.   Neurological:  Negative for dizziness and facial asymmetry.   Hematological:  Negative for adenopathy.   Psychiatric/Behavioral:  Negative for agitation and behavioral problems.  :  10 point system review pertinent positive negatives present on HPI  Objective:     Vital Signs (Most Recent):  Pulse: 94 (06/09/22 1137)  Resp: 18 (06/09/22 1137)  BP: 122/62 (06/09/22 0937)  SpO2: (!) 93 % (06/09/22 1137)   Vital Signs (24h Range):  Pulse:  [] 94  Resp:  [13-20] 18  SpO2:  [93 %-96 %] 93 %  BP: (122)/(62) 122/62     Weight: 77.6 kg (171 lb)  Body mass index is 26.78 kg/m².    Physical Exam  Vitals and nursing note reviewed.   Constitutional:       General: She is not in acute distress.  HENT:      Head: Normocephalic and atraumatic.      Nose: Nose normal.       Mouth/Throat:      Mouth: Mucous membranes are moist.   Eyes:      Pupils: Pupils are equal, round, and reactive to light.   Cardiovascular:      Rate and Rhythm: Normal rate.   Pulmonary:      Effort: Pulmonary effort is normal.      Comments: Decrease air entry on right side.  Abdominal:      General: There is no distension.   Musculoskeletal:         General: Normal range of motion.      Cervical back: Normal range of motion.   Skin:     General: Skin is warm.   Neurological:      General: No focal deficit present.      Mental Status: She is alert and oriented to person, place, and time.   Psychiatric:         Behavior: Behavior normal.         CRANIAL NERVES     CN III, IV, VI   Pupils are equal, round, and reactive to light.     Significant Labs: All pertinent labs within the past 24 hours have been reviewed.  CBC:   Recent Labs   Lab 06/09/22  1040 06/10/22  0511 06/10/22  0945   WBC 14.09* 14.54*  --    HGB 13.4 12.2  --    HCT 42.3 38.5 38   * 425  --      CMP:   Recent Labs   Lab 06/09/22  1040 06/09/22  2125 06/10/22  0511   * 131* 132*   K 3.0* 3.5 3.3*   CL 90* 90* 91*   CO2 31* 32* 33*   * 144* 156*   BUN 26* 22 23   CREATININE 0.7 0.6 0.6   CALCIUM 12.6* 11.5* 11.8*   PROT 7.0  --   --    ALBUMIN 2.1*  --   --    BILITOT 0.9  --   --    ALKPHOS 125  --   --    AST 29  --   --    ALT 19  --   --    ANIONGAP 13 9 8   EGFRNONAA >60.0 >60.0 >60.0       Significant Imaging: I have reviewed all pertinent imaging results/findings within the past 24 hours.      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      Hypercalcemia  Hypercalcemia of malignancy      Noncompliance  Ongoing  issue      Chronic obstructive pulmonary disease  Stable         VTE Risk Mitigation (From admission, onward)         Ordered     IP VTE HIGH RISK PATIENT  Once         06/09/22 1429     Place sequential compression device  Until discontinued         06/09/22 1429                Discharge Planning   NIRMAL: 6/12/2022     Code Status: Full Code   Is the patient medically ready for discharge?:     Reason for patient still in hospital (select all that apply): Treatment  Discharge Plan A: Home with family                  Norm Leon MD  Department of Hospital Medicine   [facility]

## 2022-06-10 NOTE — HOSPITAL COURSE
Patient got admitted with Acute hypoxemic respiratory failure In the background of Lung cancer/COPD  Had Bronchoscopy on 06/10 and Patient's pathology report was positive for a non small cell bronchogenic carcinoma  Pt was evaluated by Pulmonology/Radiation Oncology/Oncology Mds  Pt and Family opted for hospice care and was discharged back to home

## 2022-06-10 NOTE — PLAN OF CARE
met with patient at bedside to explain IMM. Patient verbalized her understanding of the IMM and signed. IMM scanned into media.       06/10/22 0911   Medicare Message   Important Message from Medicare regarding Discharge Appeal Rights Signed/date by patient/caregiver;Explained to patient/caregiver   Date IMM was signed 06/10/22   Time IMM was signed 0911

## 2022-06-10 NOTE — NURSING
Pt o2 sats holding in low 80s on 3L NC , Pt increased to 7L o2 holding at 90% . Respiratory walking into pts room .

## 2022-06-10 NOTE — PROCEDURES
Bronchoscopy    Indication:  Probable squamous cell carcinoma metastatic in multiple locations  Medications:  Per anesthesia  Specimens:  Bronchial lavage, transbronchial brushes, transbronchial lung tissue biopsies  Complications:  None    The bronchoscope was introduced through the endotracheal tube.  The trachea was midline. The stephen was sharp. The right upper lobe, right middle lobe, and right lower lobe subdivided into the usual subsegments.  The spur between the right upper lobe in the bronchus intermedius was very widened.  The orifice to the right middle lobe was slit-like and could not be entered.  The right lower lobe surprisingly once the scope was pushed through the initial obstruction in the bronchus intermedius was open.  The superior subsegment was nearly completely occluded with submucosal tumor.  There was submucosal tumor present throughout the airways of the right lower lobe and the right middle lobe orifice.  The left upper lobe and left lower lobe subdivided into the usual subsegments. There were no endobronchial or submucosal abnormalities noted.  The scope was positioned into the right lower lobe and a lavage was taken.  75 cc were instilled and 20 were returned.  This was followed by 1 transbronchial brush and 10 transbronchial and bronchial lung tissue biopsies.  The specimens were , cytology,and histopathology. A post procedure x-ray has been ordered. The patient tolerated the procedure well.  The patient did require Vapotherm following the procedure.  She was on 10 L of oxygen prior to the procedure.

## 2022-06-10 NOTE — ASSESSMENT & PLAN NOTE
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

## 2022-06-10 NOTE — CARE UPDATE
06/10/22 1241   Patient Assessment/Suction   Level of Consciousness (AVPU) alert   Respiratory Effort Normal;Unlabored   All Lung Fields Breath Sounds diminished   PRE-TX-O2   O2 Device (Oxygen Therapy) Vapotherm   $ Vapotherm Daily Charge Vapotherm Daily   $ Vapotherm Equipment Vapotherm Circuit;Nasal Cannula   Humidification temp set 35   Humidification temp actual 35   Flow (L/min) 40   Oxygen Concentration (%) 85   SpO2 95 %   Pulse Oximetry Type Continuous   Pulse 105   Resp (!) 22   Ready to Wean/Extubation Screen   FIO2<=50 (chart decimal) (!) 0.85   Education   $ Education Ventilator Oxygen;15 min   Respiratory Evaluation   $ Care Plan Tech Time 15 min   $ Eval/Re-eval Charges Re-evaluation   Evaluation For New Orders     Pt transferred from PACU on NRBM. Tolerated well. Placed back on vapotherm, 40lpm 85% upon arrival to room. will wean as tolerated. Adjusted nebulizers per Protocol to Q6. Prn tx available for distress/wheezing. Will continue to monitor pt.

## 2022-06-10 NOTE — ANESTHESIA PROCEDURE NOTES
Intubation    Date/Time: 6/10/2022 9:17 AM  Performed by: Delfino Beltran CRNA  Authorized by: Delfino Beltran CRNA     Intubation:     Induction:  Intravenous    Intubated:  Postinduction    Mask Ventilation:  Easy mask    Attempts:  1    Attempted By:  CRNA    Method of Intubation:  Video laryngoscopy    Blade:  Zamora 3    Laryngeal View Grade: Grade I - full view of cords      Difficult Airway Encountered?: No      Complications:  None    Airway Device:  Oral endotracheal tube    Airway Device Size:  8.5    Style/Cuff Inflation:  Cuffed (inflated to minimal occlusive pressure)    Tube secured:  21    Secured at:  The lips    Placement Verified By:  Capnometry    Complicating Factors:  None    Findings Post-Intubation:  BS equal bilateral and atraumatic/condition of teeth unchanged

## 2022-06-10 NOTE — PROGRESS NOTES
Nephrology Consult Note        Patient Name: Jia Vega  MRN: [id number]    Patient Class: IP- Inpatient   Admission Date: 6/9/2022  Length of Stay: 1 days  Date of Service: 6/10/2022    Attending Physician: Norm Leon MD  Primary Care Provider: Patrick Olson MD    Reason for Consult: hypercalcemia/hyponatremia/hypokalemia    SUBJECTIVE:     HPI: 71-year-old female who was admitted a few weeks ago with hypercalcemia with low PTH and what appeared to be a right lower lobe pneumonia. Treated as hypercalcemia of malignancy, received bisphosphonate.The patient was supposed to follow up after 2 weeks after her antibiotics with a new chest x-ray to see if infiltration cleared but she did not. She returns today complaining of more shortness of breath and now her CT clearly shows a very large mass with necrotic areas and adenopathy. She is again hypercalcemic. She received again therapy with bisphosphonate and IVF.    6/10 VSS, Bronchoscopy today. Add oral KCL.    Past Medical History:   Diagnosis Date    Arthritis     Asthma     Full dentures     GERD (gastroesophageal reflux disease)     Hypertension     Seasonal allergies     Wears glasses      Past Surgical History:   Procedure Laterality Date    BACK SURGERY      cubital tunnel release  left    HAND SURGERY      right and left: ganglion cyst    HYSTERECTOMY  BSO    NECK SURGERY       Family History   Family history unknown: Yes     Social History     Tobacco Use    Smoking status: Current Every Day Smoker     Packs/day: 0.25     Years: 40.00     Pack years: 10.00     Types: Cigarettes    Smokeless tobacco: Never Used    Tobacco comment: patient states 3 cigs per day   Substance Use Topics    Alcohol use: No     Alcohol/week: 1.7 standard drinks     Types: 2 Standard drinks or equivalent per week    Drug use: No       Review of patient's allergies indicates:  No Known Allergies    Outpatient meds:  No current facility-administered medications on  file prior to encounter.     Current Outpatient Medications on File Prior to Encounter   Medication Sig Dispense Refill    albuterol (PROVENTIL/VENTOLIN HFA) 90 mcg/actuation inhaler INHALE 1 PUFF BY MOUTH EVERY 4 TO 6 HOURS AS NEEDED FOR WHEEZING OR SHORTNESS OF BREATH (Patient taking differently: Inhale 1 puff into the lungs every 4 to 6 hours as needed. INHALE 1 PUFF BY MOUTH EVERY 4 TO 6 HOURS AS NEEDED FOR WHEEZING OR SHORTNESS OF BREATH) 8.5 g 2    aspirin (ECOTRIN) 81 MG EC tablet Take 1 tablet (81 mg total) by mouth Daily. 100 tablet 4    methylPREDNISolone (MEDROL DOSEPACK) 4 mg tablet       omeprazole (PRILOSEC) 40 MG capsule Take 1 capsule (40 mg total) by mouth once daily. 30 capsule 5    oxybutynin (DITROPAN) 5 MG Tab TAKE 1 TABLET BY MOUTH TWICE DAILY( WATCH FOR DRY MOUTH AND CONSTIPATION) (Patient taking differently: Take 5 mg by mouth 2 (two) times daily. TAKE 1 TABLET BY MOUTH TWICE DAILY( WATCH FOR DRY MOUTH AND CONSTIPATION)) 60 tablet 5    pravastatin (PRAVACHOL) 80 MG tablet Take 1 tablet (80 mg total) by mouth every evening. 90 tablet 0    predniSONE (DELTASONE) 20 MG tablet       sertraline (ZOLOFT) 50 MG tablet TAKE 1 TABLET(50 MG) BY MOUTH EVERY NIGHT (Patient taking differently: Take 50 mg by mouth every evening.) 90 tablet 0    VITAMIN B COMPLEX (B COMPLEX 1 ORAL) Take 1 tablet by mouth Daily.      guaiFENesin (MUCINEX) 600 mg 12 hr tablet Take 2 tablets (1,200 mg total) by mouth 2 (two) times daily. 20 tablet 0    multivitamin capsule Take 1 capsule by mouth once daily.      [DISCONTINUED] amlodipine-olmesartan (ALICE) 5-20 mg per tablet TAKE 1 TABLET BY MOUTH DAILY (Patient taking differently: Take 1 tablet by mouth once daily.) 90 tablet 1       Scheduled meds:   albuterol-ipratropium  3 mL Nebulization Q6H    aspirin  81 mg Oral Daily    guaiFENesin  600 mg Oral BID    meropenem (MERREM) IVPB  1 g Intravenous Q8H    methylPREDNISolone sodium succinate injection  40 mg  Intravenous Daily    multivitamin  1 tablet Oral Daily    pantoprazole  40 mg Oral Daily    pravastatin  80 mg Oral QHS    sertraline  50 mg Oral QHS    vancomycin (VANCOCIN) IVPB  1,250 mg Intravenous Q12H       Infusions:      PRN meds:  albuterol, LIDOcaine HCL 4%, melatonin, sodium chloride 0.9%, sodium chloride 0.9%, Pharmacy to dose Vancomycin consult **AND** vancomycin - pharmacy to dose    Review of Systems:    OBJECTIVE:     Vital Signs and IO (Last 24H):  Temp:  [97.4 °F (36.3 °C)-98.3 °F (36.8 °C)]   Pulse:  [8-122]   Resp:  [17-35]   BP: ()/()   SpO2:  [88 %-95 %]   I/O last 3 completed shifts:  In: 400 [IV Piggyback:400]  Out: -     Wt Readings from Last 5 Encounters:   06/09/22 72.4 kg (159 lb 9.8 oz)   05/17/22 77.9 kg (171 lb 11.8 oz)   04/28/22 83.5 kg (184 lb)   05/25/21 93.9 kg (207 lb 0.2 oz)   05/07/21 93.9 kg (207 lb)     Physical Exam:  Physical Exam  Constitutional:       Appearance: She is well-developed. She is not diaphoretic.   HENT:      Head: Normocephalic and atraumatic.   Eyes:      General: No scleral icterus.     Pupils: Pupils are equal, round, and reactive to light.   Cardiovascular:      Rate and Rhythm: Normal rate and regular rhythm.   Pulmonary:      Effort: Pulmonary effort is normal. No respiratory distress.      Breath sounds: No stridor.   Abdominal:      General: There is no distension.      Palpations: Abdomen is soft.   Musculoskeletal:         General: No deformity. Normal range of motion.      Cervical back: Neck supple.   Skin:     General: Skin is warm and dry.      Findings: No erythema or rash.   Neurological:      Mental Status: She is alert and oriented to person, place, and time.      Cranial Nerves: No cranial nerve deficit.   Psychiatric:         Behavior: Behavior normal.          Body mass index is 25 kg/m².    Laboratory:  Recent Labs   Lab 06/09/22  1040 06/09/22  2125 06/10/22  0511   * 131* 132*   K 3.0* 3.5 3.3*   CL 90* 90* 91*    CO2 31* 32* 33*   BUN 26* 22 23   CREATININE 0.7 0.6 0.6   ESTGFRAFRICA >60.0 >60.0 >60.0   EGFRNONAA >60.0 >60.0 >60.0   * 144* 156*       Recent Labs   Lab 06/09/22  1040 06/09/22  2125 06/10/22  0511   CALCIUM 12.6* 11.5* 11.8*   ALBUMIN 2.1*  --   --    PHOS 2.6*  --   --    MG 2.0  --   --        Recent Labs   Lab 05/16/22  0421 06/09/22  1040   PTH, Intact 4.7 L 3.7 L       Recent Labs   Lab 06/09/22  1040 06/10/22  0511 06/10/22  0945   WBC 14.09* 14.54*  --    HGB 13.4 12.2  --    HCT 42.3 38.5 38   * 425  --    MCV 79* 78*  --    MCHC 31.7* 31.7*  --    MONO 6.1  0.9 4.5  0.7  --        Recent Labs   Lab 06/09/22  1040   BILITOT 0.9   PROT 7.0   ALBUMIN 2.1*   ALKPHOS 125   ALT 19   AST 29       Recent Labs   Lab 05/15/22  1816 06/09/22  0955   Color, UA Yellow Yellow   Appearance, UA Cloudy A Hazy A   pH, UA 6.0 6.0   Specific Gravity, UA 1.025 1.020   Protein, UA 2+ A 1+ A   Glucose, UA Negative Negative   Ketones, UA Negative 1+ A   Urobilinogen, UA 1.0 Negative   Bilirubin (UA) 1+ A Negative   Occult Blood UA 3+ A Negative   Nitrite, UA Negative Negative   RBC, UA 9 H 2   WBC, UA >100 H 2   Bacteria Occasional Rare   Hyaline Casts, UA 2370 A 8 A       Microbiology Results (last 7 days)     Procedure Component Value Units Date/Time    GIANLUCA prep [[id number]] Collected: 06/10/22 1036    Order Status: Completed Specimen: Respiratory from Bronchial Wash, RLL Updated: 06/10/22 1317     KOH Prep No yeast or fungal elements seen    Narrative:      Micro wash    AFB Culture & Smear [[id number]] Collected: 06/10/22 1036    Order Status: Sent Specimen: Respiratory from Bronchial Wash, RLL Updated: 06/10/22 1056    Fungus culture [[id number]] Collected: 06/10/22 1036    Order Status: Sent Specimen: Respiratory from Bronchial Wash, RLL Updated: 06/10/22 1056    Culture, Respiratory with Gram Stain [[id number]] Collected: 06/10/22 1036    Order Status: Sent Specimen: Respiratory from Bronchial Wash, RLL  Updated: 06/10/22 1056    Blood culture #2 **CANNOT BE ORDERED STAT** [[id number]] Collected: 06/09/22 1320    Order Status: Completed Specimen: Blood from Peripheral, Forearm, Left Updated: 06/09/22 1958     Blood Culture, Routine No Growth to date    Blood culture #1 **CANNOT BE ORDERED STAT** [[id number]] Collected: 06/09/22 1305    Order Status: Completed Specimen: Blood from Peripheral, Hand, Right Updated: 06/09/22 1958     Blood Culture, Routine No Growth to date        ASSESSMENT/PLAN:     Hypercalcemia due to advanced lung malignancy  Hyponatremia due to SIADH?  Hypokalemia due to poor oral intake?  Anemia  Keep MAP > 60, SBP > 100. Hold IVF for now.  Agree with bronchoscopy to clarify prognosis and plan.  Regular diet as tolerated. Limit oral water to 1.5L/day.  Check urine lytes and osmolality, add TSH.  Hgb and HCT are acceptable. Monitor. No GENE.    Thank you for allowing us to participate in the care of your patient!   We will follow the patient and provide recommendations as needed.    Patient care time was spent personally by me on the following activities:   · Obtaining a history.  · Examination of patient.  · Providing medical care at the patients bedside.  · Developing a treatment plan with patient or surrogate and bedside caregivers.  · Ordering and reviewing laboratory studies, radiographic studies, pulse oximetry.  · Ordering and performing treatments and interventions.  · Evaluation of patient's response to treatment.  · Discussions with consultants while on the unit and immediately available to the patient.  · Re-evaluation of the patient's condition.  · Documentation in the medical record.     Damian Don MD    [facility]  [address] [place] [address]  [place] [address]    [phone] - tel  [phone] - fax    6/10/2022

## 2022-06-10 NOTE — RESPIRATORY THERAPY
06/09/22 2006   Patient Assessment/Suction   Level of Consciousness (AVPU) alert   Respiratory Effort Normal;Unlabored   Expansion/Accessory Muscles/Retractions no use of accessory muscles   All Lung Fields Breath Sounds diminished   Rhythm/Pattern, Respiratory unlabored;pattern regular;depth regular   PRE-TX-O2   O2 Device (Oxygen Therapy) nasal cannula   $ Is the patient on Low Flow Oxygen? Yes   Flow (L/min) 3  (home 02)   SpO2 95 %   Pulse Oximetry Type Intermittent   $ Pulse Oximetry - Multiple Charge Pulse Oximetry - Multiple   Pulse 80   Resp 18   Aerosol Therapy   $ Aerosol Therapy Charges Aerosol Treatment   Daily Review of Necessity (SVN) completed   Respiratory Treatment Status (SVN) given   Treatment Route (SVN) oxygen;mask   Patient Position (SVN) HOB elevated   Post Treatment Assessment (SVN) breath sounds unchanged   Signs of Intolerance (SVN) none   Breath Sounds Post-Respiratory Treatment   Throughout All Fields Post-Treatment All Fields   Throughout All Fields Post-Treatment no change   Post-treatment Heart Rate (beats/min) 87   Post-treatment Resp Rate (breaths/min) 18   Equipment Change   $ RT Equipment Treatment nebuilzer;Aerosol treatment mask   Education   $ Education Bronchodilator;15 min   Respiratory Evaluation   $ Care Plan Tech Time 15 min

## 2022-06-10 NOTE — PROGRESS NOTES
Pulmonary/Critical Care progress note      PATIENT NAME: Jia Vega  MRN: [id number]  TODAY'S DATE: 06/10/2022  4:32 PM  ADMIT DATE: 2022  AGE: 71 y.o. : 1950      HPI:  Patient is a 71-year-old female who was admitted a few weeks ago with hypercalcemia what appeared to be a right lower lobe pneumonia.  The patient was supposed to follow up after 2 weeks after her antibiotics with a new chest x-ray to see if cm infiltrating cleared but did not.  She returns today complaining of more shortness of breath and now her CT clearly shows a very large mass with necrotic areas and adenopathy.  She is again hypercalcemic.  When asked why the patient in follow-up in the office, she states she for got.  She states she is not resume smoking.  She just does not have a taste for it any longer.    6/10 the patient consented to undergo bronchoscopy.  She is still not sure if she would undergo chemotherapy if her or offered to her.  She is now on Vapotherm following her procedure.    REVIEW OF SYSTEMS  GENERAL: Feeling weak.  EYES: Vision is good.  ENT: No sinusitis or pharyngitis.   HEART: No chest pain or palpitations.  LUNGS:  She is coughing and now having some streaky hemoptysis.  GI: No Nausea, vomiting, constipation, diarrhea, or reflux.  : No dysuria, hesitancy, or nocturia.  SKIN: No lesions or rashes.  MUSCULOSKELETAL: No joint pain or myalgias.  NEURO: No headaches or neuropathy.  LYMPH: No edema or adenopathy.  PSYCH: No anxiety or depression.  ENDO: No weight change.    No change in the patient's Past Medical History, Past Surgical History, Social History or Family History since admission.        VITAL SIGNS (MOST RECENT)  Temp: 98 °F (36.7 °C) (06/10/22 1215)  Pulse: 93 (06/10/22 1355)  Resp: 20 (06/10/22 1355)  BP: 96/65 (06/10/22 1316)  SpO2: (!) 92 % (06/10/22 1355)    INTAKE AND OUTPUT (LAST 24 HOURS):    Intake/Output Summary (Last 24 hours) at 6/10/2022 4288  Last data filed at 6/10/2022  1300  Gross per 24 hour   Intake 350 ml   Output --   Net 350 ml       WEIGHT  Wt Readings from Last 1 Encounters:   06/09/22 72.4 kg (159 lb 9.8 oz)       PHYSICAL EXAM  GENERAL: Older patient in no distress.  HEENT: Pupils equal and reactive. Extraocular movements intact. Nose intact. Pharynx moist.  NECK: Supple.   HEART: Regular rate and rhythm. No murmur or gallop auscultated.  LUNGS:  There are no breath sounds in the right base or the right lower anterior thorax either.. Lung excursion symmetrical. No change in fremitus.   ABDOMEN: Bowel sounds present. Non-tender, no masses palpated.  : Normal anatomy.  EXTREMITIES: Normal muscle tone and joint movement, no cyanosis or clubbing.   LYMPHATICS: No adenopathy palpated, no edema.  SKIN: Dry, intact, no lesions.   NEURO: Cranial nerves II-XII intact. Motor strength 5/5 bilaterally, upper and lower extremities.  PSYCH: Appropriate affect      CBC LAST (LAST 24 HOURS)  Recent Labs   Lab 06/10/22  0511 06/10/22  0945   WBC 14.54*  --    RBC 4.93  --    HGB 12.2  --    HCT 38.5 38   MCV 78*  --    MCH 24.7*  --    MCHC 31.7*  --    RDW 17.2*  --      --    MPV 9.3  --    GRAN 87.0*  12.7*  --    LYMPH 6.4*  0.9*  --    MONO 4.5  0.7  --    BASO 0.03  --    NRBC 0  --        CHEMISTRY LAST (LAST 24 HOURS)  Recent Labs   Lab 06/10/22  0511 06/10/22  0945   *  --    K 3.3*  --    CL 91*  --    CO2 33*  --    ANIONGAP 8  --    BUN 23  --    CREATININE 0.6  --    *  --    CALCIUM 11.8*  --    PH  --  7.412    Corrected calcium 13.3      CARDIAC PROFILE (LAST 24 HOURS)  Recent Labs   Lab 06/09/22  1040   BNP 94   CPK 14*   TROPONINI 0.037       LAST 7 DAYS MICROBIOLOGY   Microbiology Results (last 7 days)     Procedure Component Value Units Date/Time    Blood culture #2 **CANNOT BE ORDERED STAT** [[id number]] Collected: 06/09/22 1320    Order Status: Completed Specimen: Blood from Peripheral, Forearm, Left Updated: 06/10/22 0633     Blood Culture,  Routine No Growth to date      No Growth to date    Blood culture #1 **CANNOT BE ORDERED STAT** [[id number]] Collected: 06/09/22 1305    Order Status: Completed Specimen: Blood from Peripheral, Hand, Right Updated: 06/10/22 1432     Blood Culture, Routine No Growth to date      No Growth to date    Culture, Respiratory with Gram Stain [[id number]] Collected: 06/10/22 1036    Order Status: Completed Specimen: Respiratory from Bronchial Wash, RLL Updated: 06/10/22 1401     Gram Stain (Respiratory) <10 epithelial cells per low power field.     Gram Stain (Respiratory) Moderate WBC's     Gram Stain (Respiratory) No organisms seen    Narrative:      Micro wash    GIANLUCA prep [[id number]] Collected: 06/10/22 1036    Order Status: Completed Specimen: Respiratory from Bronchial Wash, RLL Updated: 06/10/22 1317     KOH Prep No yeast or fungal elements seen    Narrative:      Micro wash    AFB Culture & Smear [[id number]] Collected: 06/10/22 1036    Order Status: Sent Specimen: Respiratory from Bronchial Wash, RLL Updated: 06/10/22 1056    Fungus culture [[id number]] Collected: 06/10/22 1036    Order Status: Sent Specimen: Respiratory from Bronchial Wash, RLL Updated: 06/10/22 1056          MOST RECENT IMAGING  X-Ray Chest AP Portable  HISTORY: post bronchoscopy with biopsy    FINDINGS: Portable chest radiograph at 1007 hours compared to 06/09/2022 shows the cardiomediastinal silhouette and pulmonary vasculature are stable, with aortic vascular calcifications.    There are right mid and lower lung airspace opacities, not significantly changed, with scattered reticulonodular densities in both lungs. There is no pneumothorax.    IMPRESSION: Negative for pneumothorax post bronchoscopy and biopsy.    Electronically signed by:  Carlos Earl MD  6/10/2022 10:44 AM CDT Workstation: [id number]  FL Flouro Usage  See Notes    This procedure was auto-finalized.      CURRENT VISIT EKG  Results for orders placed or performed during the hospital  encounter of 06/09/22   EKG 12-lead    Narrative    Test Reason : R06.02,    Vent. Rate : 096 BPM     Atrial Rate : 096 BPM     P-R Int : 126 ms          QRS Dur : 086 ms      QT Int : 368 ms       P-R-T Axes : 080 045 029 degrees     QTc Int : 464 ms    Normal sinus rhythm  Normal ECG  When compared with ECG of 15-MAY-2022 17:42,  Fusion complexes are no longer Present    Referred By: [email or web address]   SELF           Confirmed By:        ECHOCARDIOGRAM RESULTS  No results found for this or any previous visit.      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  Hypercalcemia  COPD  Some component of postobstructive pneumonia?    Await bronchoscopy results  Track calcium  Bronchodilators  Oxygen to keep sats 90% or better, wean Vapotherm as tolerated  Narrow antibiotics soon    Kimberly Tellez MD  [facility]  Department of Pulmonology  Date of Service: 06/10/2022  4:32 PM

## 2022-06-10 NOTE — PLAN OF CARE
Formerly [facility], later [facility]  Initial Discharge Assessment       Primary Care Provider: Patrick Olson MD    Admission Diagnosis: Pneumonia due to infectious organism, unspecified laterality, unspecified part of lung [J18.9]    Admission Date: 6/9/2022  Expected Discharge Date:       met with patient at bedside to complete assessment. Demographics, PCP and insurance verified. Patient oriented xs 4. Patient has Standard walker and 02 at home. Patient lives with spouse. No current HH. No dialysis. Patient reports some weakness throughout the day with ambulation. Patient will be transported home by family. No further needs to discuss at this time. Case management to assist with any additional needs at discharge.     Discharge Barriers Identified: None    Payor: MEDICARE / Plan: MEDICARE PART A & B / Product Type: Government /     Extended Emergency Contact Information  Primary Emergency Contact: Dami Vega  Address: [address] [place] [address] [facility]  Mobile Phone: [phone]  Relation: Spouse  Secondary Emergency Contact: GaryRich  Mobile Phone: [phone]  Relation: Son    Discharge Plan A: Home with family  Discharge Plan B: Home      [facility] [id number] - [place], [place] - [address] [facility] [address] AT [facility] & [address] [address]  Phone: [phone] Fax: [phone]      Initial Assessment (most recent)     Adult Discharge Assessment - 06/10/22 1715        Discharge Assessment    Assessment Type Discharge Planning Assessment     Confirmed/corrected address, phone number and insurance Yes     Confirmed Demographics Correct on Facesheet     Source of Information patient;family     Lives With spouse     Do you expect to return to your current living situation? Yes     Do you have help at home or someone to help you manage your care at home? Yes     Who are your caregiver(s) and their phone number(s)? Dami Vega (Spouse)    [phone] (Mobile)     Prior to hospitilization cognitive status: Alert/Oriented     Current cognitive status: Alert/Oriented     Walking or Climbing Stairs Difficulty none   Patient and spouse report some weakness throughout the day with ambulation.    Dressing/Bathing Difficulty bathing difficulty, assistance 1 person     Home Accessibility wheelchair accessible     Equipment Currently Used at Home oxygen;walker, standard     Patient currently being followed by outpatient case management? No     Do you currently have service(s) that help you manage your care at home? No     Do you take prescription medications? Yes     Do you have prescription coverage? Yes     Do you have any problems affording any of your prescribed medications? No     Is the patient taking medications as prescribed? yes     Who is going to help you get home at discharge? Dami Vega (Spouse)   [phone] (Mobile)     How do you get to doctors appointments? car, drives self     Are you on dialysis? No     Do you take coumadin? No     Discharge Plan A Home with family     Discharge Plan B Home     DME Needed Upon Discharge  none     Discharge Plan discussed with: Patient;Spouse/sig other     Name(s) and Number(s) Dami Vega (Spouse)   [phone] (Mobile)     Discharge Barriers Identified None        Relationship/Environment    Name(s) of Who Lives With Patient Dami Vega (Spouse)   [phone] (Mobile)

## 2022-06-10 NOTE — ANESTHESIA PREPROCEDURE EVALUATION
06/10/2022  Jia Vega is a 71 y.o., female.      Patient Active Problem List   Diagnosis    Cubital tunnel syndrome on left    Bilateral carpal tunnel syndrome    Degenerative arthritis of wrist    Anxiety    Benign essential hypertension    Chronic obstructive pulmonary disease    Hypercholesterolemia    Current smoker    Asymptomatic menopause    Nocturnal enuresis    Hay fever    Osteopenia    Other specified disorders of cartilage, other site    Panlobular emphysema    Leukocytosis    Severe sepsis    Pneumonia of right lower lobe due to infectious organism    Acute hypoxemic respiratory failure    UTI (urinary tract infection)    Noncompliance       Past Surgical History:   Procedure Laterality Date    BACK SURGERY      cubital tunnel release  left    HAND SURGERY      right and left: ganglion cyst    HYSTERECTOMY  BSO    NECK SURGERY          Tobacco Use:  The patient  reports that she has been smoking cigarettes. She has a 10.00 pack-year smoking history. She has never used smokeless tobacco.     Results for orders placed or performed during the hospital encounter of 06/09/22   EKG 12-lead    Collection Time: 06/09/22 10:42 AM    Narrative    Test Reason : R06.02,    Vent. Rate : 096 BPM     Atrial Rate : 096 BPM     P-R Int : 126 ms          QRS Dur : 086 ms      QT Int : 368 ms       P-R-T Axes : 080 045 029 degrees     QTc Int : 464 ms    Normal sinus rhythm  Normal ECG  When compared with ECG of 15-MAY-2022 17:42,  Fusion complexes are no longer Present    Referred By: [email or web address]   SELF           Confirmed By:         Imaging Results          CT Abdomen Pelvis With Contrast (Final result)  Result time 06/09/22 13:02:26    Final result by Kevon Oden MD (06/09/22 13:02:26)                 Narrative:    CMS MANDATED QUALITY DATA - CT RADIATION  436    All CT  scans at this facility utilize dose modulation, iterative reconstruction, and/or weight based dosing when appropriate to reduce radiation dose to as low as reasonably achievable.    CLINICAL HISTORY:  71 years (1950) Female Pulmonary embolism (PE) suspected, high prob    TECHNIQUE:  CT CHEST ANGIOGRAPHY WITH IV CONTRAST, CT ABDOMEN PELVIS WITH IV CONTRAST.  482+261 images obtained. Axial CT examination of the chest with attention to the pulmonary arteries was performed using contiguous axial images from the diaphragms to the lung apices following the intravenous administration of non-ionic contrast material.  Images were reviewed using lung, mediastinal, and bone windows. The study was performed with thin sections with subsequent 3-D reformations, mid and reconstructions at multiple planes with images were stored in the PACS system. Subsequently axial CT of the abdomen and pelvis was obtained.    CONTRAST:  IV contrast was administered uneventfully.    COMPARISON:  CT from May 16, 2022.    FINDINGS:  CTA PE evaluation: This is a moderate quality study for the evaluation of pulmonary embolism secondary to motion artifact. The pulmonary arteries are normal in appearance without pulmonary emboli noted up to the segmental level, noting the limitations of CT technique for identifying small or isolated subsegmental emboli.    CT Chest:  Visualized neck: Within normal limits.  Lungs: Consolidative airspace opacity is seen in the high acute finding a majority of the right lower lobe. There is moderate, upper lobe, centrilobular predominant emphysematous lung disease. The left lung shows only minimal dependent atelectasis.  Airway: There is airway opacification in the right lower lobe suggesting a combination of mucous/secretions, debris and/or aspiration.  Pleura: There is no pleural effusion. There is no pneumothorax.  Cardiovascular: The heart is normal in size. There are small calcified plaques at the aortic  arch.  Mediastinum: Lymphadenopathy is present with index nodes outlined below:  -15 x 13 mm pretracheal node (image 72)  -19 x 18 mm precarinal node (image 100)  -23 x 21 mm right hilar node (image 118)  -28 x 19 mm subcarinal node (image 121)  Soft tissues: The peripheral soft tissues appear normal.  Musculoskeletal: The visualized osseous structures appear normal.  There are no suspicious osseous lesions.  Esophagus: The esophagus appears grossly normal.    CT Abdomen:  Liver: The liver is normal in size. There is a 7 mm rounded low-attenuation structure in the hepatic dome possibly representing a small cyst (image 33) partially obscured by motion artifact. There is relative diffuse low-attenuation of the liver suggesting steatosis.  Gallbladder: The gallbladder is within normal limits.  Biliary Tree: No intra or extrahepatic ductal dilation.  Spleen: Within normal limits.  Pancreas: There is a 9 x 6 mm enhancing nodule along the anterior body of the spleen (image 73). No pancreatic ductal dilation is seen. There is mild diffuse fatty infiltration of the pancreas.  Adrenal Glands: Nonspecific bilateral adrenal thickening is seen, similar to the previous exam.  Kidneys: No hydronephrosis, hydroureter or evidence of obstructive uropathy. There is a 7 mm simple cyst in the upper pole the right kidney (image 62). There is a 1.9 cm indeterminate rounded isoattenuating structure in the inferior pole the left kidney (image 107), this measure simple fluid.  Vasculature: Scattered calcified plaques throughout the distal abdominal aorta and iliacs with no aneurysm.  Lymph nodes: No abdominal lymphadenopathy is seen.  Intraperitoneal structures: There is no ascites.  Bowel: Moderate volume of stool and gas in the distal colon with a nonobstructive bowel gas pattern. Consider correlation for constipation.  Abdominal wall: Small fat-containing left inguinal hernia.  Musculoskeletal: There is degenerative disc disease and  facet arthropathy in the lumbar spine with no aggressive appearing lytic or blastic lesions. There appears to been a laminectomy at L2-L3, with grade 1 anterolisthesis of L2 on L3. There is a partially sacralized L5 vertebral body which is sacralized of the transverse process on the left.    CT Pelvis:  Bladder: The urinary bladder is within normal limits.  Reproductive Organs: The uterus is not visualized/surgically absent.  Pelvic Lymph nodes: No pelvic lymphadenopathy or pelvic mass is identified.    IMPRESSION:  1. No evidence of pulmonary embolism to the segmental arterial level. If there is continued clinical concern, consider further evaluation with lower extremity doppler.    2. Interval worsening consolidative airspace opacity in the right lower lobe suggestive of pneumonia. Consider radiographic follow-up in 6-8 weeks after treatment to document resolution (as radiographic findings may persist beyond clinical symptoms and underlying malignancy is not excluded).    3. Mediastinal lymphadenopathy with index nodes outlined above similar to the previous exam.    4. Moderate nonspecific bilateral adrenal thickening.    5. Indeterminate enhancing nodule along the anterior margin of the pancreatic body, and indeterminate rounded isoattenuating structure along the inferior pole of the left kidney, possibly representing complex/complicated cysts, lymph nodes or neoplasm, consider nonemergent follow-up contrast enhanced MRI.    6. Moderate volume of stool and gas in the colon with a nonobstructive bowel gas pattern, consider correlation for constipation.    7. Numerous additional, and incidental findings as above.                    .    Electronically signed by:  Kevon Oden MD  6/9/2022 1:02 PM CDT Workstation: [id number]                             CTA Chest Non-Coronary (PE Study) (Final result)  Result time 06/09/22 13:02:26    Final result by Kevon Oden MD (06/09/22 13:02:26)                  Narrative:    CMS MANDATED QUALITY DATA - CT RADIATION  436    All CT scans at this facility utilize dose modulation, iterative reconstruction, and/or weight based dosing when appropriate to reduce radiation dose to as low as reasonably achievable.    CLINICAL HISTORY:  71 years (1950) Female Pulmonary embolism (PE) suspected, high prob    TECHNIQUE:  CT CHEST ANGIOGRAPHY WITH IV CONTRAST, CT ABDOMEN PELVIS WITH IV CONTRAST.  482+261 images obtained. Axial CT examination of the chest with attention to the pulmonary arteries was performed using contiguous axial images from the diaphragms to the lung apices following the intravenous administration of non-ionic contrast material.  Images were reviewed using lung, mediastinal, and bone windows. The study was performed with thin sections with subsequent 3-D reformations, mid and reconstructions at multiple planes with images were stored in the PACS system. Subsequently axial CT of the abdomen and pelvis was obtained.    CONTRAST:  IV contrast was administered uneventfully.    COMPARISON:  CT from May 16, 2022.    FINDINGS:  CTA PE evaluation: This is a moderate quality study for the evaluation of pulmonary embolism secondary to motion artifact. The pulmonary arteries are normal in appearance without pulmonary emboli noted up to the segmental level, noting the limitations of CT technique for identifying small or isolated subsegmental emboli.    CT Chest:  Visualized neck: Within normal limits.  Lungs: Consolidative airspace opacity is seen in the high acute finding a majority of the right lower lobe. There is moderate, upper lobe, centrilobular predominant emphysematous lung disease. The left lung shows only minimal dependent atelectasis.  Airway: There is airway opacification in the right lower lobe suggesting a combination of mucous/secretions, debris and/or aspiration.  Pleura: There is no pleural effusion. There is no pneumothorax.  Cardiovascular: The heart is  normal in size. There are small calcified plaques at the aortic arch.  Mediastinum: Lymphadenopathy is present with index nodes outlined below:  -15 x 13 mm pretracheal node (image 72)  -19 x 18 mm precarinal node (image 100)  -23 x 21 mm right hilar node (image 118)  -28 x 19 mm subcarinal node (image 121)  Soft tissues: The peripheral soft tissues appear normal.  Musculoskeletal: The visualized osseous structures appear normal.  There are no suspicious osseous lesions.  Esophagus: The esophagus appears grossly normal.    CT Abdomen:  Liver: The liver is normal in size. There is a 7 mm rounded low-attenuation structure in the hepatic dome possibly representing a small cyst (image 33) partially obscured by motion artifact. There is relative diffuse low-attenuation of the liver suggesting steatosis.  Gallbladder: The gallbladder is within normal limits.  Biliary Tree: No intra or extrahepatic ductal dilation.  Spleen: Within normal limits.  Pancreas: There is a 9 x 6 mm enhancing nodule along the anterior body of the spleen (image 73). No pancreatic ductal dilation is seen. There is mild diffuse fatty infiltration of the pancreas.  Adrenal Glands: Nonspecific bilateral adrenal thickening is seen, similar to the previous exam.  Kidneys: No hydronephrosis, hydroureter or evidence of obstructive uropathy. There is a 7 mm simple cyst in the upper pole the right kidney (image 62). There is a 1.9 cm indeterminate rounded isoattenuating structure in the inferior pole the left kidney (image 107), this measure simple fluid.  Vasculature: Scattered calcified plaques throughout the distal abdominal aorta and iliacs with no aneurysm.  Lymph nodes: No abdominal lymphadenopathy is seen.  Intraperitoneal structures: There is no ascites.  Bowel: Moderate volume of stool and gas in the distal colon with a nonobstructive bowel gas pattern. Consider correlation for constipation.  Abdominal wall: Small fat-containing left inguinal  hernia.  Musculoskeletal: There is degenerative disc disease and facet arthropathy in the lumbar spine with no aggressive appearing lytic or blastic lesions. There appears to been a laminectomy at L2-L3, with grade 1 anterolisthesis of L2 on L3. There is a partially sacralized L5 vertebral body which is sacralized of the transverse process on the left.    CT Pelvis:  Bladder: The urinary bladder is within normal limits.  Reproductive Organs: The uterus is not visualized/surgically absent.  Pelvic Lymph nodes: No pelvic lymphadenopathy or pelvic mass is identified.    IMPRESSION:  1. No evidence of pulmonary embolism to the segmental arterial level. If there is continued clinical concern, consider further evaluation with lower extremity doppler.    2. Interval worsening consolidative airspace opacity in the right lower lobe suggestive of pneumonia. Consider radiographic follow-up in 6-8 weeks after treatment to document resolution (as radiographic findings may persist beyond clinical symptoms and underlying malignancy is not excluded).    3. Mediastinal lymphadenopathy with index nodes outlined above similar to the previous exam.    4. Moderate nonspecific bilateral adrenal thickening.    5. Indeterminate enhancing nodule along the anterior margin of the pancreatic body, and indeterminate rounded isoattenuating structure along the inferior pole of the left kidney, possibly representing complex/complicated cysts, lymph nodes or neoplasm, consider nonemergent follow-up contrast enhanced MRI.    6. Moderate volume of stool and gas in the colon with a nonobstructive bowel gas pattern, consider correlation for constipation.    7. Numerous additional, and incidental findings as above.                    .    Electronically signed by:  Kevon Oden MD  6/9/2022 1:02 PM CDT Workstation: [id number]                             X-Ray Chest 1 View (Final result)  Result time 06/09/22 11:24:00    Final result by Pauline ANDINO  MD Jennifer (06/09/22 11:24:00)                 Narrative:    Portable chest x-ray at 10:51 AM is compared to prior study of 5/18/2010    Clinical history shortness of breath    Cardiomediastinal silhouette is normal in size.    There is stable alveolar consolidation in the right mid and lower lung compatible with pneumonia. There are faint reticular nodular opacities in the left lung base. The upper lobes are clear. There is been prior cervical fusion.    IMPRESSION: Stable alveolar consolidation in the right mid and lower lung with increasing reticulonodular opacities in left lung base suggestive of pneumonia.    Electronically signed by:  Pauline Rodriguez MD  6/9/2022 11:24 AM CDT Workstation: [id number]                               Lab Results   Component Value Date    WBC 14.54 (H) 06/10/2022    HGB 12.2 06/10/2022    HCT 38.5 06/10/2022    MCV 78 (L) 06/10/2022     06/10/2022     BMP  Lab Results   Component Value Date     (L) 06/10/2022    K 3.3 (L) 06/10/2022    CL 91 (L) 06/10/2022    CO2 33 (H) 06/10/2022    BUN 23 06/10/2022    CREATININE 0.6 06/10/2022    CALCIUM 11.8 (H) 06/10/2022    ANIONGAP 8 06/10/2022     (H) 06/10/2022     (H) 06/09/2022     (H) 06/09/2022       No results found for this or any previous visit.              Pre-op Assessment    I have reviewed the Patient Summary Reports.     I have reviewed the Nursing Notes. I have reviewed the NPO Status.   I have reviewed the Medications.     Review of Systems  Anesthesia Hx:  No problems with previous Anesthesia  Denies Family Hx of Anesthesia complications.   Denies Personal Hx of Anesthesia complications.   Social:  Former Smoker    Hematology/Oncology:  Hematology Normal      Current/Recent Cancer. (suspected lung cancer)   Cardiovascular:   Hypertension, well controlled    Pulmonary:   Pneumonia COPD, moderate Asthma moderate Hx of asthma/emphysema/COPD.  Hypoxemia  Suspect lung cancer with post  obstructive pneumonia   Hepatic/GI:   GERD, well controlled    Musculoskeletal:   Arthritis (hands and wrists)     Neurological:   Neuromuscular Disease, (hx carpal/cubital tunnel symptoms)    Endocrine:  Endocrine Normal        Physical Exam  General: Well nourished, Cooperative, Alert and Oriented    Airway:  Mallampati: III / II  Mouth Opening: Normal  TM Distance: Normal  Tongue: Normal  Neck ROM: Normal ROM    Dental:  Dentures    Chest/Lungs:  Clear to auscultation    Heart:  Rate: Normal  Rhythm: Regular Rhythm  Sounds: Normal    Abdomen:  Normal, Soft, Nontender        Anesthesia Plan  Type of Anesthesia, risks & benefits discussed:    Anesthesia Type: Gen ETT  Intra-op Monitoring Plan: Standard ASA Monitors  Post Op Pain Control Plan: IV/PO Opioids PRN  (medical reason for not using multimodal pain management)  Induction:  IV  Airway Plan: Video, Post-Induction  Informed Consent: Informed consent signed with the Patient and all parties understand the risks and agree with anesthesia plan.  All questions answered.   ASA Score: 3 Emergent  Anesthesia Plan Notes: GETA  Propofol   NO VOLATILE ANESTHETICS  Zofran    Ready For Surgery From Anesthesia Perspective.     .

## 2022-06-10 NOTE — ANESTHESIA POSTPROCEDURE EVALUATION
Anesthesia Post Evaluation    Patient: Jia Vega    Procedure(s) Performed: Procedure(s) (LRB):  BRONCHOSCOPY, WITH FLUOROSCOPY (N/A)    Final Anesthesia Type: general      Patient location during evaluation: PACU  Patient participation: Yes- Able to Participate  Level of consciousness: awake and alert  Post-procedure vital signs: reviewed and stable  Pain management: adequate  Airway patency: patent    PONV status at discharge: No PONV  Anesthetic complications: no      Cardiovascular status: blood pressure returned to baseline and stable  Respiratory status: unassisted (Patient on Vapotherm)  Hydration status: euvolemic  Follow-up not needed.  Comments: Patient placed on Vapotherm per Dr. Tellez.  Oxygenation sats are maintaining higher than 92%.  Patient will be going back to her room.          Vitals Value Taken Time   /71 06/10/22 1200   Temp 36.3 °C (97.4 °F) 06/10/22 0955   Pulse 99 06/10/22 1207   Resp 24 06/10/22 1207   SpO2 93 % 06/10/22 1207   Vitals shown include unvalidated device data.      No case tracking events are documented in the log.      Pain/Susan Score: Susan Score: 9 (6/10/2022 12:00 PM)

## 2022-06-11 NOTE — SUBJECTIVE & OBJECTIVE
Interval History:     Review of Systems   Constitutional:  Positive for fatigue. Negative for activity change and appetite change.   HENT:  Negative for congestion and dental problem.    Eyes:  Negative for discharge and itching.   Respiratory:  Positive for shortness of breath.    Cardiovascular:  Negative for chest pain.   Gastrointestinal:  Negative for abdominal distention and abdominal pain.   Endocrine: Negative for cold intolerance.   Genitourinary:  Negative for difficulty urinating and dysuria.   Musculoskeletal:  Negative for arthralgias and back pain.   Skin:  Negative for color change.   Neurological:  Negative for dizziness and facial asymmetry.   Hematological:  Negative for adenopathy.   Psychiatric/Behavioral:  Negative for agitation and behavioral problems.    Objective:     Vital Signs (Most Recent):  Temp: 97.6 °F (36.4 °C) (06/11/22 1718)  Pulse: 80 (06/11/22 1718)  Resp: 18 (06/11/22 1718)  BP: 125/73 (06/11/22 1718)  SpO2: (!) 90 % (06/11/22 1718)   Vital Signs (24h Range):  Temp:  [97.4 °F (36.3 °C)-98.6 °F (37 °C)] 97.6 °F (36.4 °C)  Pulse:  [75-86] 80  Resp:  [16-19] 18  SpO2:  [88 %-95 %] 90 %  BP: ()/(51-82) 125/73     Weight: 72.4 kg (159 lb 9.8 oz)  Body mass index is 25 kg/m².    Intake/Output Summary (Last 24 hours) at 6/11/2022 1815  Last data filed at 6/11/2022 1619  Gross per 24 hour   Intake 50 ml   Output 1250 ml   Net -1200 ml      Physical Exam  Vitals and nursing note reviewed.   Constitutional:       General: She is not in acute distress.  HENT:      Head: Atraumatic.      Right Ear: External ear normal.      Left Ear: External ear normal.      Nose: Nose normal.      Mouth/Throat:      Mouth: Mucous membranes are moist.   Cardiovascular:      Rate and Rhythm: Normal rate.   Pulmonary:      Effort: Pulmonary effort is normal.      Breath sounds: Rales present.   Abdominal:      General: There is no distension.   Musculoskeletal:         General: Normal range of motion.       Cervical back: Normal range of motion.   Skin:     General: Skin is warm.   Neurological:      Mental Status: She is alert and oriented to person, place, and time.   Psychiatric:         Behavior: Behavior normal.       Significant Labs: All pertinent labs within the past 24 hours have been reviewed.  CBC:   Recent Labs   Lab 06/10/22  0511 06/10/22  0945 06/11/22  0301   WBC 14.54*  --  14.60*   HGB 12.2  --  11.9*   HCT 38.5 38 37.9     --  421     CMP:   Recent Labs   Lab 06/09/22 2125 06/10/22  0511 06/11/22  0301   * 132* 134*   K 3.5 3.3* 3.9   CL 90* 91* 93*   CO2 32* 33* 34*   * 156* 105   BUN 22 23 19   CREATININE 0.6 0.6 0.6   CALCIUM 11.5* 11.8* 11.0*   ANIONGAP 9 8 7*   EGFRNONAA >60.0 >60.0 >60.0       Significant Imaging: I have reviewed all pertinent imaging results/findings within the past 24 hours.

## 2022-06-11 NOTE — PROGRESS NOTES
Pulmonary/Critical Care progress note      PATIENT NAME: Jia Vega  MRN: [id number]  TODAY'S DATE: 2022  4:32 PM  ADMIT DATE: 2022  AGE: 71 y.o. : 1950      HPI:  Patient is a 71-year-old female who was admitted a few weeks ago with hypercalcemia what appeared to be a right lower lobe pneumonia.  The patient was supposed to follow up after 2 weeks after her antibiotics with a new chest x-ray to see if cm infiltrating cleared but did not.  She returns today complaining of more shortness of breath and now her CT clearly shows a very large mass with necrotic areas and adenopathy.  She is again hypercalcemic.  When asked why the patient in follow-up in the office, she states she for got.  She states she is not resume smoking.  She just does not have a taste for it any longer.    6/10 the patient consented to undergo bronchoscopy.  She is still not sure if she would undergo chemotherapy if her or offered to her.  She is now on Vapotherm following her procedure.    \  Above from Dr Tellez and below Dr Marshall:   no new c/o  Violent cough    No change in the patient's Past Medical History, Past Surgical History, Social History or Family History since admission.        VITAL SIGNS (MOST RECENT)  Temp: 97.5 °F (36.4 °C) (22 0440)  Pulse: 78 (22 0440)  Resp: 19 (22 0440)  BP: 122/75 (22 0440)  SpO2: (!) 92 % (22 0440)    INTAKE AND OUTPUT (LAST 24 HOURS):    Intake/Output Summary (Last 24 hours) at 2022 0642  Last data filed at 2022 0609  Gross per 24 hour   Intake 200 ml   Output 300 ml   Net -100 ml       WEIGHT  Wt Readings from Last 1 Encounters:   22 72.4 kg (159 lb 9.8 oz)      Exam included Vitals as listed, and patient's appearance and affect and alertness and mood, oral exam for yeast and hygiene and pharynx lesions and Mallapatti (M) score, neck with inspection for jvd and masses and thyroid abnormalities and lymph nodes (supraclavicular and  infraclavicular nodes and axillary also examined and noted if abn), chest exam included symmetry and effort and fremitus and percussion and auscultation, cardiac exam included rhythm and gallops and murmur and rubs and jvd and edema, abdominal exam for mass and hepatosplenomegaly and tenderness and hernias and bowel sounds, Musculoskeletal exam with muscle tone and posture and mobility/gait and  strength, and skin for rashes and cyanosis and pallor and turgor, extremity for clubbing.  Findings were normal except for pertinent findings listed below:  Alert, min rhonchi, rest good.        CBC LAST (LAST 24 HOURS)  Recent Labs   Lab 06/11/22  0301   WBC 14.60*   RBC 4.74   HGB 11.9*   HCT 37.9   MCV 80*   MCH 25.1*   MCHC 31.4*   RDW 17.2*      MPV 9.3       CHEMISTRY LAST (LAST 24 HOURS)  Recent Labs   Lab 06/10/22  0945 06/11/22  0301   NA  --  134*   K  --  3.9   CL  --  93*   CO2  --  34*   ANIONGAP  --  7*   BUN  --  19   CREATININE  --  0.6   GLU  --  105   CALCIUM  --  11.0*   PH 7.412  --     Corrected calcium 13.3      CARDIAC PROFILE (LAST 24 HOURS)  Recent Labs   Lab 06/09/22  1040   BNP 94   CPK 14*   TROPONINI 0.037       LAST 7 DAYS MICROBIOLOGY   Microbiology Results (last 7 days)     Procedure Component Value Units Date/Time    Blood culture #2 **CANNOT BE ORDERED STAT** [[id number]] Collected: 06/09/22 1320    Order Status: Completed Specimen: Blood from Peripheral, Forearm, Left Updated: 06/10/22 1432     Blood Culture, Routine No Growth to date      No Growth to date    Blood culture #1 **CANNOT BE ORDERED STAT** [[id number]] Collected: 06/09/22 1305    Order Status: Completed Specimen: Blood from Peripheral, Hand, Right Updated: 06/10/22 1432     Blood Culture, Routine No Growth to date      No Growth to date    Culture, Respiratory with Gram Stain [[id number]] Collected: 06/10/22 1036    Order Status: Completed Specimen: Respiratory from Bronchial Wash, RLL Updated: 06/10/22 1401     Gram  Stain (Respiratory) <10 epithelial cells per low power field.     Gram Stain (Respiratory) Moderate WBC's     Gram Stain (Respiratory) No organisms seen    Narrative:      Micro wash    GIANLUCA prep [[id number]] Collected: 06/10/22 1036    Order Status: Completed Specimen: Respiratory from Bronchial Wash, RLL Updated: 06/10/22 1317     KOH Prep No yeast or fungal elements seen    Narrative:      Micro wash    AFB Culture & Smear [[id number]] Collected: 06/10/22 1036    Order Status: Sent Specimen: Respiratory from Bronchial Wash, RLL Updated: 06/10/22 1056    Fungus culture [[id number]] Collected: 06/10/22 1036    Order Status: Sent Specimen: Respiratory from Bronchial Wash, RLL Updated: 06/10/22 1056          MOST RECENT IMAGING  X-Ray Chest AP Portable  HISTORY: post bronchoscopy with biopsy    FINDINGS: Portable chest radiograph at 1007 hours compared to 06/09/2022 shows the cardiomediastinal silhouette and pulmonary vasculature are stable, with aortic vascular calcifications.    There are right mid and lower lung airspace opacities, not significantly changed, with scattered reticulonodular densities in both lungs. There is no pneumothorax.    IMPRESSION: Negative for pneumothorax post bronchoscopy and biopsy.    Electronically signed by:  Carlos Earl MD  6/10/2022 10:44 AM CDT Workstation: [id number]  [place] Flouro Usage  See Notes    This procedure was auto-finalized.      CURRENT VISIT EKG  Results for orders placed or performed during the hospital encounter of 06/09/22   EKG 12-lead    Narrative    Test Reason : R06.02,    Vent. Rate : 096 BPM     Atrial Rate : 096 BPM     P-R Int : 126 ms          QRS Dur : 086 ms      QT Int : 368 ms       P-R-T Axes : 080 045 029 degrees     QTc Int : 464 ms    Normal sinus rhythm  Normal ECG  When compared with ECG of 15-MAY-2022 17:42,  Fusion complexes are no longer Present    Referred By: [email or web address]   SELF           Confirmed By:        ECHOCARDIOGRAM RESULTS  No results found  for this or any previous visit.      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  Hypercalcemia  COPD  Some component of postobstructive pneumonia?    Await bronchoscopy results  Track calcium  Bronchodilators  Oxygen to keep sats 90% or better, wean Vapotherm as tolerated  Narrow antibiotics soon  Above from Dr Tellez and below Dr Marshall:  6/11 no fever, vss, 50% ox, merrem/vanc, bronch culture 6/10 neg so far, ca 11,   Ct chest 6/9 with drowned right lower lobe,  Will order cough rx

## 2022-06-11 NOTE — RESPIRATORY THERAPY
06/10/22 2041   Patient Assessment/Suction   Level of Consciousness (AVPU) alert   Respiratory Effort Unlabored;Normal   Expansion/Accessory Muscles/Retractions no use of accessory muscles   All Lung Fields Breath Sounds diminished   Rhythm/Pattern, Respiratory unlabored;pattern regular;depth regular   PRE-TX-O2   O2 Device (Oxygen Therapy) Vapotherm   $ Is the patient on High Flow Oxygen? Yes   Humidification temp set 35   Humidification temp actual 35   Flow (L/min) 30   Oxygen Concentration (%) 50   SpO2 (!) 93 %   Pulse Oximetry Type Continuous   $ Pulse Oximetry - Multiple Charge Pulse Oximetry - Multiple   Pulse 80   Resp 18   Aerosol Therapy   $ Aerosol Therapy Charges Aerosol Treatment   Daily Review of Necessity (SVN) completed   Respiratory Treatment Status (SVN) given   Treatment Route (SVN) mask;oxygen   Patient Position (SVN) semi-Foster's   Post Treatment Assessment (SVN) breath sounds unchanged   Signs of Intolerance (SVN) none   Breath Sounds Post-Respiratory Treatment   Throughout All Fields Post-Treatment All Fields   Throughout All Fields Post-Treatment no change   Post-treatment Heart Rate (beats/min) 79   Post-treatment Resp Rate (breaths/min) 18   Ready to Wean/Extubation Screen   FIO2<=50 (chart decimal) 0.5   Education   $ Education DME Oxygen;Bronchodilator;15 min   Respiratory Evaluation   $ Care Plan Tech Time 15 min

## 2022-06-11 NOTE — CARE UPDATE
06/11/22 0712   Patient Assessment/Suction   Level of Consciousness (AVPU) alert   Respiratory Effort Unlabored   Expansion/Accessory Muscles/Retractions expansion symmetric   All Lung Fields Breath Sounds diminished   Rhythm/Pattern, Respiratory unlabored   Cough Frequency infrequent   Cough Type dry   PRE-TX-O2   O2 Device (Oxygen Therapy) Vapotherm   $ Is the patient on High Flow Oxygen? Yes   Humidification temp set 35   Humidification temp actual 35   Flow (L/min) 30   Oxygen Concentration (%) 50   SpO2 (!) 90 %   Pulse Oximetry Type Continuous   $ Pulse Oximetry - Multiple Charge Pulse Oximetry - Multiple   Pulse 85   Resp 16   Aerosol Therapy   $ Aerosol Therapy Charges Aerosol Treatment   Daily Review of Necessity (SVN) completed   Respiratory Treatment Status (SVN) given   Treatment Route (SVN) mask   Patient Position (SVN) semi-Foster's   Post Treatment Assessment (SVN) breath sounds unchanged   Signs of Intolerance (SVN) none   Breath Sounds Post-Respiratory Treatment   Throughout All Fields Post-Treatment All Fields   Throughout All Fields Post-Treatment no change   Post-treatment Heart Rate (beats/min) 79   Post-treatment Resp Rate (breaths/min) 16   Education   $ Education DME Oxygen;15 min   Respiratory Evaluation   $ Care Plan Tech Time 15 min

## 2022-06-11 NOTE — PROGRESS NOTES
Pharmacokinetic Assessment Follow Up: IV Vancomycin    Vancomycin serum concentration assessment(s):    The trough level was drawn correctly and can be used to guide therapy at this time. The measurement is above the desired definitive target range of 10 to 15 mcg/mL.    Vancomycin Regimen Plan:    Re-dose when the random level is less than 15 mcg/mL, next level to be drawn at 0430 on 06/12    Drug levels (last 3 results):  Recent Labs   Lab Result Units 06/11/22  0301   Vancomycin-Trough ug/mL 35.1*       Pharmacy will continue to follow and monitor vancomycin.    Please contact pharmacy at extension [phone] for questions regarding this assessment.    Thank you for the consult,   Nathaniel Young       Patient brief summary:  Jia Vega is a 71 y.o. female initiated on antimicrobial therapy with IV Vancomycin for treatment of lower respiratory infection    Drug Allergies:   Review of patient's allergies indicates:  No Known Allergies    Actual Body Weight:   72.4 kg    Renal Function:   Estimated Creatinine Clearance: 84.8 mL/min (based on SCr of 0.6 mg/dL).,     CBC (last 72 hours):  Recent Labs   Lab Result Units 06/09/22  1040 06/10/22  0511 06/11/22  0301   WBC K/uL 14.09* 14.54* 14.60*   Hemoglobin g/dL 13.4 12.2 11.9*   Hematocrit % 42.3 38.5 37.9   Platelets K/uL 471* 425 421   Gran % % 84.5* 87.0*  --    Lymph % % 7.5* 6.4*  --    Mono % % 6.1 4.5  --    Eosinophil % % 0.1 0.1  --    Basophil % % 0.2 0.2  --    Differential Method  Automated Automated  --        Metabolic Panel (last 72 hours):  Recent Labs   Lab Result Units 06/09/22  0955 06/09/22  1040 06/09/22 2125 06/10/22  0511 06/11/22  0301   Sodium mmol/L  --  134* 131* 132* 134*   Potassium mmol/L  --  3.0* 3.5 3.3* 3.9   Chloride mmol/L  --  90* 90* 91* 93*   CO2 mmol/L  --  31* 32* 33* 34*   Glucose mg/dL  --  127* 144* 156* 105   Glucose, UA  Negative  --   --   --   --    BUN mg/dL  --  26* 22 23 19   Creatinine mg/dL  --  0.7 0.6 0.6 0.6    Creatinine, Urine mg/dL 90.0  --   --   --   --    Albumin g/dL  --  2.1*  --   --   --    Total Bilirubin mg/dL  --  0.9  --   --   --    Alkaline Phosphatase U/L  --  125  --   --   --    AST U/L  --  29  --   --   --    ALT U/L  --  19  --   --   --    Magnesium mg/dL  --  2.0  --   --   --    Phosphorus mg/dL  --  2.6*  --   --   --        Vancomycin Administrations:  vancomycin given in the last 96 hours                     vancomycin 1.25 g in dextrose 5% 250 mL IVPB (ready to mix) (mg) 1,250 mg New Bag 06/10/22 1810     1,250 mg New Bag  0400    vancomycin 500 mg in dextrose 5 % 100 mL IVPB (ready to mix system) (mg) 500 mg New Bag 06/09/22 1451    vancomycin in dextrose 5 % 1 gram/250 mL IVPB 1,000 mg (mg) 1,000 mg New Bag 06/09/22 1451                    Microbiologic Results:  Microbiology Results (last 7 days)       Procedure Component Value Units Date/Time    Blood culture #2 **CANNOT BE ORDERED STAT** [[id number]] Collected: 06/09/22 1320    Order Status: Completed Specimen: Blood from Peripheral, Forearm, Left Updated: 06/10/22 1432     Blood Culture, Routine No Growth to date      No Growth to date    Blood culture #1 **CANNOT BE ORDERED STAT** [[id number]] Collected: 06/09/22 1305    Order Status: Completed Specimen: Blood from Peripheral, Hand, Right Updated: 06/10/22 1432     Blood Culture, Routine No Growth to date      No Growth to date    Culture, Respiratory with Gram Stain [[id number]] Collected: 06/10/22 1036    Order Status: Completed Specimen: Respiratory from Bronchial Wash, RLL Updated: 06/10/22 1401     Gram Stain (Respiratory) <10 epithelial cells per low power field.     Gram Stain (Respiratory) Moderate WBC's     Gram Stain (Respiratory) No organisms seen    Narrative:      Micro wash    GIANLUCA prep [[id number]] Collected: 06/10/22 1036    Order Status: Completed Specimen: Respiratory from Bronchial Wash, RLL Updated: 06/10/22 1317     KOH Prep No yeast or fungal elements seen     Narrative:      Micro wash    AFB Culture & Smear [[id number]] Collected: 06/10/22 1036    Order Status: Sent Specimen: Respiratory from Bronchial Wash, RLL Updated: 06/10/22 1056    Fungus culture [[id number]] Collected: 06/10/22 1036    Order Status: Sent Specimen: Respiratory from Bronchial Wash, RLL Updated: 06/10/22 1056

## 2022-06-11 NOTE — PROGRESS NOTES
Nephrology Consult Note        Patient Name: Jia Vega  MRN: [id number]    Patient Class: IP- Inpatient   Admission Date: 6/9/2022  Length of Stay: 2 days  Date of Service: 6/11/2022    Attending Physician: Norm Leon MD  Primary Care Provider: Patrick Olson MD    Reason for Consult: hypercalcemia/hyponatremia/hypokalemia    SUBJECTIVE:     HPI: 71-year-old female who was admitted a few weeks ago with hypercalcemia with low PTH and what appeared to be a right lower lobe pneumonia. Treated as hypercalcemia of malignancy, received bisphosphonate.The patient was supposed to follow up after 2 weeks after her antibiotics with a new chest x-ray to see if infiltration cleared but she did not. She returns today complaining of more shortness of breath and now her CT clearly shows a very large mass with necrotic areas and adenopathy. She is again hypercalcemic. She received again therapy with bisphosphonate and IVF.    6/10 VSS, Bronchoscopy today. Add oral KCL.  6/11 VSS, Ca remains elevated. Patient has very little insight into her illness and prognosis.    Past Medical History:   Diagnosis Date    Arthritis     Asthma     Full dentures     GERD (gastroesophageal reflux disease)     Hypertension     Seasonal allergies     Wears glasses      Past Surgical History:   Procedure Laterality Date    BACK SURGERY      cubital tunnel release  left    HAND SURGERY      right and left: ganglion cyst    HYSTERECTOMY  BSO    NECK SURGERY       Family History   Family history unknown: Yes     Social History     Tobacco Use    Smoking status: Current Every Day Smoker     Packs/day: 0.25     Years: 40.00     Pack years: 10.00     Types: Cigarettes    Smokeless tobacco: Never Used    Tobacco comment: patient states 3 cigs per day   Substance Use Topics    Alcohol use: No     Alcohol/week: 1.7 standard drinks     Types: 2 Standard drinks or equivalent per week    Drug use: No       Review of patient's allergies  indicates:  No Known Allergies    Outpatient meds:  No current facility-administered medications on file prior to encounter.     Current Outpatient Medications on File Prior to Encounter   Medication Sig Dispense Refill    albuterol (PROVENTIL/VENTOLIN HFA) 90 mcg/actuation inhaler INHALE 1 PUFF BY MOUTH EVERY 4 TO 6 HOURS AS NEEDED FOR WHEEZING OR SHORTNESS OF BREATH (Patient taking differently: Inhale 1 puff into the lungs every 4 to 6 hours as needed. INHALE 1 PUFF BY MOUTH EVERY 4 TO 6 HOURS AS NEEDED FOR WHEEZING OR SHORTNESS OF BREATH) 8.5 g 2    aspirin (ECOTRIN) 81 MG EC tablet Take 1 tablet (81 mg total) by mouth Daily. 100 tablet 4    methylPREDNISolone (MEDROL DOSEPACK) 4 mg tablet       omeprazole (PRILOSEC) 40 MG capsule Take 1 capsule (40 mg total) by mouth once daily. 30 capsule 5    oxybutynin (DITROPAN) 5 MG Tab TAKE 1 TABLET BY MOUTH TWICE DAILY( WATCH FOR DRY MOUTH AND CONSTIPATION) (Patient taking differently: Take 5 mg by mouth 2 (two) times daily. TAKE 1 TABLET BY MOUTH TWICE DAILY( WATCH FOR DRY MOUTH AND CONSTIPATION)) 60 tablet 5    pravastatin (PRAVACHOL) 80 MG tablet Take 1 tablet (80 mg total) by mouth every evening. 90 tablet 0    predniSONE (DELTASONE) 20 MG tablet       sertraline (ZOLOFT) 50 MG tablet TAKE 1 TABLET(50 MG) BY MOUTH EVERY NIGHT (Patient taking differently: Take 50 mg by mouth every evening.) 90 tablet 0    VITAMIN B COMPLEX (B COMPLEX 1 ORAL) Take 1 tablet by mouth Daily.      guaiFENesin (MUCINEX) 600 mg 12 hr tablet Take 2 tablets (1,200 mg total) by mouth 2 (two) times daily. 20 tablet 0    multivitamin capsule Take 1 capsule by mouth once daily.      [DISCONTINUED] amlodipine-olmesartan (ALICE) 5-20 mg per tablet TAKE 1 TABLET BY MOUTH DAILY (Patient taking differently: Take 1 tablet by mouth once daily.) 90 tablet 1       Scheduled meds:   albuterol-ipratropium  3 mL Nebulization Q6H WAKE    aspirin  81 mg Oral Daily    guaiFENesin  600 mg Oral BID     meropenem (MERREM) IVPB  1 g Intravenous Q8H    methylPREDNISolone sodium succinate injection  40 mg Intravenous Daily    multivitamin  1 tablet Oral Daily    pantoprazole  40 mg Oral Daily    potassium chloride  20 mEq Oral BID    pravastatin  80 mg Oral QHS       Infusions:      PRN meds:  albuterol, LIDOcaine HCL 4%, melatonin, sodium chloride 0.9%, sodium chloride 0.9%, Pharmacy to dose Vancomycin consult **AND** vancomycin - pharmacy to dose    Review of Systems:    OBJECTIVE:     Vital Signs and IO (Last 24H):  Temp:  [97.5 °F (36.4 °C)-98.6 °F (37 °C)]   Pulse:  []   Resp:  [16-35]   BP: ()/()   SpO2:  [88 %-95 %]   I/O last 3 completed shifts:  In: 200 [P.O.:150; IV Piggyback:50]  Out: 300 [Urine:300]    Wt Readings from Last 5 Encounters:   06/09/22 72.4 kg (159 lb 9.8 oz)   05/17/22 77.9 kg (171 lb 11.8 oz)   04/28/22 83.5 kg (184 lb)   05/25/21 93.9 kg (207 lb 0.2 oz)   05/07/21 93.9 kg (207 lb)     Physical Exam:  Physical Exam  Constitutional:       General: She is not in acute distress.     Appearance: She is well-developed. She is ill-appearing. She is not diaphoretic.   HENT:      Head: Normocephalic and atraumatic.   Eyes:      General: No scleral icterus.     Pupils: Pupils are equal, round, and reactive to light.   Cardiovascular:      Rate and Rhythm: Normal rate and regular rhythm.   Pulmonary:      Effort: Pulmonary effort is normal. No respiratory distress.      Breath sounds: No stridor.   Abdominal:      General: There is no distension.      Palpations: Abdomen is soft.   Musculoskeletal:         General: No deformity. Normal range of motion.      Cervical back: Neck supple.   Skin:     General: Skin is warm and dry.      Findings: No erythema or rash.   Neurological:      General: No focal deficit present.      Mental Status: She is alert.      Cranial Nerves: No cranial nerve deficit.   Psychiatric:         Behavior: Behavior normal.          Body mass index is  25 kg/m².    Laboratory:  Recent Labs   Lab 06/09/22  2125 06/10/22  0511 06/11/22  0301   * 132* 134*   K 3.5 3.3* 3.9   CL 90* 91* 93*   CO2 32* 33* 34*   BUN 22 23 19   CREATININE 0.6 0.6 0.6   ESTGFRAFRICA >60.0 >60.0 >60.0   EGFRNONAA >60.0 >60.0 >60.0   * 156* 105       Recent Labs   Lab 06/09/22  1040 06/09/22  2125 06/10/22  0511 06/11/22  0301   CALCIUM 12.6* 11.5* 11.8* 11.0*   ALBUMIN 2.1*  --   --   --    PHOS 2.6*  --   --   --    MG 2.0  --   --   --        Recent Labs   Lab 05/16/22  0421 06/09/22  1040   PTH, Intact 4.7 L 3.7 L       Recent Labs   Lab 06/09/22  1040 06/10/22  0511 06/10/22  0945 06/11/22  0301   WBC 14.09* 14.54*  --  14.60*   HGB 13.4 12.2  --  11.9*   HCT 42.3 38.5 38 37.9   * 425  --  421   MCV 79* 78*  --  80*   MCHC 31.7* 31.7*  --  31.4*   MONO 6.1  0.9 4.5  0.7  --   --        Recent Labs   Lab 06/09/22  1040   BILITOT 0.9   PROT 7.0   ALBUMIN 2.1*   ALKPHOS 125   ALT 19   AST 29       Recent Labs   Lab 05/15/22  1816 06/09/22  0955   Color, UA Yellow Yellow   Appearance, UA Cloudy A Hazy A   pH, UA 6.0 6.0   Specific Gravity, UA 1.025 1.020   Protein, UA 2+ A 1+ A   Glucose, UA Negative Negative   Ketones, UA Negative 1+ A   Urobilinogen, UA 1.0 Negative   Bilirubin (UA) 1+ A Negative   Occult Blood UA 3+ A Negative   Nitrite, UA Negative Negative   RBC, UA 9 H 2   WBC, UA >100 H 2   Bacteria Occasional Rare   Hyaline Casts, UA 2370 A 8 A       Microbiology Results (last 7 days)     Procedure Component Value Units Date/Time    Blood culture #2 **CANNOT BE ORDERED STAT** [[id number]] Collected: 06/09/22 1320    Order Status: Completed Specimen: Blood from Peripheral, Forearm, Left Updated: 06/10/22 1432     Blood Culture, Routine No Growth to date      No Growth to date    Blood culture #1 **CANNOT BE ORDERED STAT** [[id number]] Collected: 06/09/22 1305    Order Status: Completed Specimen: Blood from Peripheral, Hand, Right Updated: 06/10/22 1432     Blood  Culture, Routine No Growth to date      No Growth to date    Culture, Respiratory with Gram Stain [[id number]] Collected: 06/10/22 1036    Order Status: Completed Specimen: Respiratory from Bronchial Wash, RLL Updated: 06/10/22 1401     Gram Stain (Respiratory) <10 epithelial cells per low power field.     Gram Stain (Respiratory) Moderate WBC's     Gram Stain (Respiratory) No organisms seen    Narrative:      Micro wash    GIANLUCA prep [[id number]] Collected: 06/10/22 1036    Order Status: Completed Specimen: Respiratory from Bronchial Wash, RLL Updated: 06/10/22 1317     KOH Prep No yeast or fungal elements seen    Narrative:      Micro wash    AFB Culture & Smear [[id number]] Collected: 06/10/22 1036    Order Status: Sent Specimen: Respiratory from Bronchial Wash, RLL Updated: 06/10/22 1056    Fungus culture [[id number]] Collected: 06/10/22 1036    Order Status: Sent Specimen: Respiratory from Bronchial Wash, RLL Updated: 06/10/22 1056        ASSESSMENT/PLAN:     Hypercalcemia due to advanced lung malignancy  Hyponatremia due to SIADH?  Hypokalemia due to poor oral intake?  Anemia  Keep MAP > 60, SBP > 100. Hold IVF for now.  Regular diet as tolerated. Limit oral water to 1.5L/day.  Check urine lytes and osmolality, add TSH.  Hgb and HCT are acceptable. Monitor. No GENE.    Thank you for allowing us to participate in the care of your patient!   We will follow the patient and provide recommendations as needed.    Patient care time was spent personally by me on the following activities:   · Obtaining a history.  · Examination of patient.  · Providing medical care at the patients bedside.  · Developing a treatment plan with patient or surrogate and bedside caregivers.  · Ordering and reviewing laboratory studies, radiographic studies, pulse oximetry.  · Ordering and performing treatments and interventions.  · Evaluation of patient's response to treatment.  · Discussions with consultants while on the unit and immediately  available to the patient.  · Re-evaluation of the patient's condition.  · Documentation in the medical record.     Damian Don MD    [facility]  [address] [place] [id number]    [phone] - tel  [phone] - fax    6/11/2022

## 2022-06-11 NOTE — PROGRESS NOTES
[facility] Medicine  Progress Note    Patient Name: Jia Vega  MRN: [id number]  Patient Class: IP- Inpatient   Admission Date: 6/9/2022  Length of Stay: 2 days  Attending Physician: Norm Leon MD  Primary Care Provider: Patrick Olson MD        Subjective:     Principal Problem:Acute hypoxemic respiratory failure        HPI:  71-year-old female with past medical history of GERD asthma COPD chronic active smoker hypertension she was discharged last month due to hypoxia shortness for breath hypercalcemia she was treated with IV fluids pulmonary the rate consult on per records was very difficult to treat her in the hospital due to noncompliance refusing treatment she received antibiotics for possible pneumonia on the right lower lobe of lung due to presence of infiltrate and she was sent home with antibiotics as well it is unclear if patient has been compliant with medications she was also scheduled to have follow-up with PCP and pulmonology as outpatient for workup for possible malignancy due to presence of significant infiltrate in the right lung with hypercalcemia at the time of previous hospitalization she was also seen by Nephrology she comes to the ER today because she states that ever since past discharge she still has weakness his Itz is able to get out of the bed and walk low p.o. intake shortness of breath beyond the back bilaterally family states that she has not always been compliant with oxygen home that was arranged at discharge last time when she arrived to the ER it was noticed that she was hypoxic and required 4 L as cannula to have saturations at 90% whole cause was slightly elevated she received LR meropenem vancomycin would did reside DuoNeb she is going to be admitted to medical floor with telemetry.      Overview/Hospital Course:  06/10  Had Bronchoscopy today  Overall poor idea about her health condition and whats going on     06/11  Remains on Vapotherm  BP  levels low       Interval History:     Review of Systems   Constitutional:  Positive for fatigue. Negative for activity change and appetite change.   HENT:  Negative for congestion and dental problem.    Eyes:  Negative for discharge and itching.   Respiratory:  Positive for shortness of breath.    Cardiovascular:  Negative for chest pain.   Gastrointestinal:  Negative for abdominal distention and abdominal pain.   Endocrine: Negative for cold intolerance.   Genitourinary:  Negative for difficulty urinating and dysuria.   Musculoskeletal:  Negative for arthralgias and back pain.   Skin:  Negative for color change.   Neurological:  Negative for dizziness and facial asymmetry.   Hematological:  Negative for adenopathy.   Psychiatric/Behavioral:  Negative for agitation and behavioral problems.    Objective:     Vital Signs (Most Recent):  Temp: 97.6 °F (36.4 °C) (06/11/22 1718)  Pulse: 80 (06/11/22 1718)  Resp: 18 (06/11/22 1718)  BP: 125/73 (06/11/22 1718)  SpO2: (!) 90 % (06/11/22 1718)   Vital Signs (24h Range):  Temp:  [97.4 °F (36.3 °C)-98.6 °F (37 °C)] 97.6 °F (36.4 °C)  Pulse:  [75-86] 80  Resp:  [16-19] 18  SpO2:  [88 %-95 %] 90 %  BP: ()/(51-82) 125/73     Weight: 72.4 kg (159 lb 9.8 oz)  Body mass index is 25 kg/m².    Intake/Output Summary (Last 24 hours) at 6/11/2022 1815  Last data filed at 6/11/2022 1619  Gross per 24 hour   Intake 50 ml   Output 1250 ml   Net -1200 ml      Physical Exam  Vitals and nursing note reviewed.   Constitutional:       General: She is not in acute distress.  HENT:      Head: Atraumatic.      Right Ear: External ear normal.      Left Ear: External ear normal.      Nose: Nose normal.      Mouth/Throat:      Mouth: Mucous membranes are moist.   Cardiovascular:      Rate and Rhythm: Normal rate.   Pulmonary:      Effort: Pulmonary effort is normal.      Breath sounds: Rales present.   Abdominal:      General: There is no distension.   Musculoskeletal:         General: Normal  range of motion.      Cervical back: Normal range of motion.   Skin:     General: Skin is warm.   Neurological:      Mental Status: She is alert and oriented to person, place, and time.   Psychiatric:         Behavior: Behavior normal.       Significant Labs: All pertinent labs within the past 24 hours have been reviewed.  CBC:   Recent Labs   Lab 06/10/22  0511 06/10/22  0945 06/11/22  0301   WBC 14.54*  --  14.60*   HGB 12.2  --  11.9*   HCT 38.5 38 37.9     --  421     CMP:   Recent Labs   Lab 06/09/22 2125 06/10/22  0511 06/11/22  0301   * 132* 134*   K 3.5 3.3* 3.9   CL 90* 91* 93*   CO2 32* 33* 34*   * 156* 105   BUN 22 23 19   CREATININE 0.6 0.6 0.6   CALCIUM 11.5* 11.8* 11.0*   ANIONGAP 9 8 7*   EGFRNONAA >60.0 >60.0 >60.0       Significant Imaging: I have reviewed all pertinent imaging results/findings within the past 24 hours.      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      Hypercalcemia  Hypercalcemia of malignancy      Noncompliance  Ongoing issue      Chronic obstructive pulmonary disease  Maintain Present Rx        VTE Risk Mitigation (From admission, onward)         Ordered     IP VTE HIGH RISK PATIENT  Once         06/09/22 1429     Place sequential compression device  Until discontinued         06/09/22 1429                Discharge Planning   NIRMAL: 6/12/2022     Code Status: Full Code   Is the patient medically ready for discharge?:     Reason for patient still in hospital (select all that apply): Treatment  Discharge Plan A: Home  with family                  Norm Leon MD  Department of Hospital Medicine   [facility]

## 2022-06-11 NOTE — PLAN OF CARE
Pt compliant with POC. NO signs of distress noted.   Weakness . May need PT       Problem: Adult Inpatient Plan of Care  Goal: Plan of Care Review  Outcome: Ongoing, Progressing  Goal: Patient-Specific Goal (Individualized)  Outcome: Ongoing, Progressing  Goal: Absence of Hospital-Acquired Illness or Injury  Outcome: Ongoing, Progressing  Goal: Optimal Comfort and Wellbeing  Outcome: Ongoing, Progressing  Goal: Readiness for Transition of Care  Outcome: Ongoing, Progressing     Problem: Adjustment to Illness (Sepsis/Septic Shock)  Goal: Optimal Coping  Outcome: Ongoing, Progressing     Problem: Bleeding (Sepsis/Septic Shock)  Goal: Absence of Bleeding  Outcome: Ongoing, Progressing

## 2022-06-11 NOTE — ASSESSMENT & PLAN NOTE
In the background of Lung cancer/COPD  Had Bronchoscopy on 06/10  Maintain Vapotherm  On Home oxygen   De Escalate/Discontinue abx depending upon final BAL results  Ideally should be under hospice care asap

## 2022-06-12 NOTE — SUBJECTIVE & OBJECTIVE
Interval History:     Review of Systems   Constitutional:  Negative for activity change and appetite change.   HENT:  Negative for congestion and dental problem.    Eyes:  Negative for discharge and itching.   Respiratory:  Positive for shortness of breath.    Cardiovascular:  Negative for chest pain.   Gastrointestinal:  Negative for abdominal distention and abdominal pain.   Endocrine: Negative for cold intolerance.   Genitourinary:  Negative for difficulty urinating and dysuria.   Musculoskeletal:  Negative for arthralgias and back pain.   Skin:  Negative for color change.   Neurological:  Negative for dizziness and facial asymmetry.   Hematological:  Negative for adenopathy.   Psychiatric/Behavioral:  Negative for agitation and behavioral problems.    Objective:     Vital Signs (Most Recent):  Temp: 97.9 °F (36.6 °C) (06/12/22 1152)  Pulse: 90 (06/12/22 1152)  Resp: 18 (06/12/22 1152)  BP: 113/64 (06/12/22 1152)  SpO2: (!) 94 % (06/12/22 1152)   Vital Signs (24h Range):  Temp:  [97.3 °F (36.3 °C)-98.1 °F (36.7 °C)] 97.9 °F (36.6 °C)  Pulse:  [75-94] 90  Resp:  [16-19] 18  SpO2:  [90 %-95 %] 94 %  BP: ()/(60-73) 113/64     Weight: 72.4 kg (159 lb 9.8 oz)  Body mass index is 25 kg/m².    Intake/Output Summary (Last 24 hours) at 6/12/2022 1415  Last data filed at 6/12/2022 0600  Gross per 24 hour   Intake 100 ml   Output 250 ml   Net -150 ml      Physical Exam  Vitals and nursing note reviewed.   Constitutional:       General: She is not in acute distress.  HENT:      Head: Atraumatic.      Right Ear: External ear normal.      Left Ear: External ear normal.      Nose: Nose normal.      Mouth/Throat:      Mouth: Mucous membranes are moist.   Eyes:      General: No scleral icterus.  Cardiovascular:      Rate and Rhythm: Normal rate.   Pulmonary:      Effort: Pulmonary effort is normal.   Abdominal:      General: There is no distension.   Musculoskeletal:         General: Normal range of motion.      Cervical  back: Normal range of motion.   Skin:     General: Skin is warm.   Neurological:      Mental Status: She is alert and oriented to person, place, and time.   Psychiatric:         Behavior: Behavior normal.       Significant Labs: All pertinent labs within the past 24 hours have been reviewed.  CBC:   Recent Labs   Lab 06/11/22 0301 06/12/22 0514   WBC 14.60* 14.87*   HGB 11.9* 12.0   HCT 37.9 38.6    424     CMP:   Recent Labs   Lab 06/11/22 0301 06/12/22 0514   * 135*   K 3.9 3.5   CL 93* 96   CO2 34* 30*    93   BUN 19 17   CREATININE 0.6 0.5   CALCIUM 11.0* 10.2   ANIONGAP 7* 9   EGFRNONAA >60.0 >60.0       Significant Imaging: I have reviewed all pertinent imaging results/findings within the past 24 hours.

## 2022-06-12 NOTE — PROGRESS NOTES
[facility] Medicine  Progress Note    Patient Name: Jia Vega  MRN: [id number]  Patient Class: IP- Inpatient   Admission Date: 6/9/2022  Length of Stay: 3 days  Attending Physician: Norm Leon MD  Primary Care Provider: Patrick Olson MD        Subjective:     Principal Problem:Acute hypoxemic respiratory failure        HPI:  71-year-old female with past medical history of GERD asthma COPD chronic active smoker hypertension she was discharged last month due to hypoxia shortness for breath hypercalcemia she was treated with IV fluids pulmonary the rate consult on per records was very difficult to treat her in the hospital due to noncompliance refusing treatment she received antibiotics for possible pneumonia on the right lower lobe of lung due to presence of infiltrate and she was sent home with antibiotics as well it is unclear if patient has been compliant with medications she was also scheduled to have follow-up with PCP and pulmonology as outpatient for workup for possible malignancy due to presence of significant infiltrate in the right lung with hypercalcemia at the time of previous hospitalization she was also seen by Nephrology she comes to the ER today because she states that ever since past discharge she still has weakness his Itz is able to get out of the bed and walk low p.o. intake shortness of breath beyond the back bilaterally family states that she has not always been compliant with oxygen home that was arranged at discharge last time when she arrived to the ER it was noticed that she was hypoxic and required 4 L as cannula to have saturations at 90% whole cause was slightly elevated she received LR meropenem vancomycin would did reside DuoNeb she is going to be admitted to medical floor with telemetry.      Overview/Hospital Course:  06/10  Had Bronchoscopy today  Overall poor idea about her health condition and whats going on     06/11  Remains on Vapotherm  BP  levels low     06/12  Awaiting Pathology reports  Vitals stable  On bed all the time  Sats better       Interval History:     Review of Systems   Constitutional:  Negative for activity change and appetite change.   HENT:  Negative for congestion and dental problem.    Eyes:  Negative for discharge and itching.   Respiratory:  Positive for shortness of breath.    Cardiovascular:  Negative for chest pain.   Gastrointestinal:  Negative for abdominal distention and abdominal pain.   Endocrine: Negative for cold intolerance.   Genitourinary:  Negative for difficulty urinating and dysuria.   Musculoskeletal:  Negative for arthralgias and back pain.   Skin:  Negative for color change.   Neurological:  Negative for dizziness and facial asymmetry.   Hematological:  Negative for adenopathy.   Psychiatric/Behavioral:  Negative for agitation and behavioral problems.    Objective:     Vital Signs (Most Recent):  Temp: 97.9 °F (36.6 °C) (06/12/22 1152)  Pulse: 90 (06/12/22 1152)  Resp: 18 (06/12/22 1152)  BP: 113/64 (06/12/22 1152)  SpO2: (!) 94 % (06/12/22 1152)   Vital Signs (24h Range):  Temp:  [97.3 °F (36.3 °C)-98.1 °F (36.7 °C)] 97.9 °F (36.6 °C)  Pulse:  [75-94] 90  Resp:  [16-19] 18  SpO2:  [90 %-95 %] 94 %  BP: ()/(60-73) 113/64     Weight: 72.4 kg (159 lb 9.8 oz)  Body mass index is 25 kg/m².    Intake/Output Summary (Last 24 hours) at 6/12/2022 1415  Last data filed at 6/12/2022 0600  Gross per 24 hour   Intake 100 ml   Output 250 ml   Net -150 ml      Physical Exam  Vitals and nursing note reviewed.   Constitutional:       General: She is not in acute distress.  HENT:      Head: Atraumatic.      Right Ear: External ear normal.      Left Ear: External ear normal.      Nose: Nose normal.      Mouth/Throat:      Mouth: Mucous membranes are moist.   Eyes:      General: No scleral icterus.  Cardiovascular:      Rate and Rhythm: Normal rate.   Pulmonary:      Effort: Pulmonary effort is normal.   Abdominal:       General: There is no distension.   Musculoskeletal:         General: Normal range of motion.      Cervical back: Normal range of motion.   Skin:     General: Skin is warm.   Neurological:      Mental Status: She is alert and oriented to person, place, and time.   Psychiatric:         Behavior: Behavior normal.       Significant Labs: All pertinent labs within the past 24 hours have been reviewed.  CBC:   Recent Labs   Lab 06/11/22  0301 06/12/22  0514   WBC 14.60* 14.87*   HGB 11.9* 12.0   HCT 37.9 38.6    424     CMP:   Recent Labs   Lab 06/11/22  0301 06/12/22  0514   * 135*   K 3.9 3.5   CL 93* 96   CO2 34* 30*    93   BUN 19 17   CREATININE 0.6 0.5   CALCIUM 11.0* 10.2   ANIONGAP 7* 9   EGFRNONAA >60.0 >60.0       Significant Imaging: I have reviewed all pertinent imaging results/findings within the past 24 hours.      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      Hypercalcemia  Hypercalcemia of malignancy      Noncompliance  Ongoing issue  Also insight is very poor     Chronic obstructive pulmonary disease  Maintain Present Rx        VTE Risk Mitigation (From admission, onward)         Ordered     IP VTE HIGH RISK PATIENT  Once         06/09/22 1429     Place sequential compression device  Until discontinued         06/09/22 1429                Discharge Planning   NIRMAL: 6/14/2022     Code Status: Full Code   Is the patient medically ready for discharge?: No    Reason for patient still in hospital (select all that apply): Treatment  Discharge Plan A: Home with family                   Norm Leon MD  Department of Hospital Medicine   [facility]

## 2022-06-12 NOTE — PROGRESS NOTES
Pharmacokinetic Assessment Follow Up: IV Vancomycin    Vancomycin serum concentration assessment(s):    The random level was drawn correctly and can be used to guide therapy at this time. The measurement is within the desired definitive target range of 10 to 15 mcg/mL.    Vancomycin Regimen Plan:    Give Vancomycin 1250 mg once and Re-dose when the random level is less than 15 mcg/mL, next level to be drawn at 0700 on 06/13    Drug levels (last 3 results):  Recent Labs   Lab Result Units 06/11/22  0301 06/12/22  0514   Vancomycin, Random ug/mL  --  10.1   Vancomycin-Trough ug/mL 35.1*  --        Pharmacy will continue to follow and monitor vancomycin.    Please contact pharmacy at extension [phone] for questions regarding this assessment.    Thank you for the consult,   Nathaniel Young       Patient brief summary:  Jia Vega is a 71 y.o. female initiated on antimicrobial therapy with IV Vancomycin for treatment of lower respiratory infection    Drug Allergies:   Review of patient's allergies indicates:  No Known Allergies    Actual Body Weight:   72.4 kg    Renal Function:   Estimated Creatinine Clearance: 101.8 mL/min (based on SCr of 0.5 mg/dL).,     CBC (last 72 hours):  Recent Labs   Lab Result Units 06/09/22  1040 06/10/22  0511 06/11/22  0301 06/12/22  0514   WBC K/uL 14.09* 14.54* 14.60* 14.87*   Hemoglobin g/dL 13.4 12.2 11.9* 12.0   Hematocrit % 42.3 38.5 37.9 38.6   Platelets K/uL 471* 425 421 424   Gran % % 84.5* 87.0*  --   --    Lymph % % 7.5* 6.4*  --   --    Mono % % 6.1 4.5  --   --    Eosinophil % % 0.1 0.1  --   --    Basophil % % 0.2 0.2  --   --    Differential Method  Automated Automated  --   --        Metabolic Panel (last 72 hours):  Recent Labs   Lab Result Units 06/09/22  0955 06/09/22  1040 06/09/22  2125 06/10/22  0511 06/11/22  0301 06/11/22  1630 06/12/22  0514   Sodium mmol/L  --  134* 131* 132* 134*  --  135*   Sodium, Urine mmol/L  --   --   --   --   --  60  --    Potassium mmol/L   --  3.0* 3.5 3.3* 3.9  --  3.5   Chloride mmol/L  --  90* 90* 91* 93*  --  96   CO2 mmol/L  --  31* 32* 33* 34*  --  30*   Glucose mg/dL  --  127* 144* 156* 105  --  93   Glucose, UA  Negative  --   --   --   --   --   --    BUN mg/dL  --  26* 22 23 19  --  17   Creatinine mg/dL  --  0.7 0.6 0.6 0.6  --  0.5   Creatinine, Urine mg/dL 90.0  --   --   --   --   --   --    Albumin g/dL  --  2.1*  --   --   --   --   --    Total Bilirubin mg/dL  --  0.9  --   --   --   --   --    Alkaline Phosphatase U/L  --  125  --   --   --   --   --    AST U/L  --  29  --   --   --   --   --    ALT U/L  --  19  --   --   --   --   --    Magnesium mg/dL  --  2.0  --   --   --   --   --    Phosphorus mg/dL  --  2.6*  --   --   --   --   --        Vancomycin Administrations:  vancomycin given in the last 96 hours                     vancomycin 1.25 g in dextrose 5% 250 mL IVPB (ready to mix) (mg) 1,250 mg New Bag 06/10/22 1810     1,250 mg New Bag  0400    vancomycin 500 mg in dextrose 5 % 100 mL IVPB (ready to mix system) (mg) 500 mg New Bag 06/09/22 1451    vancomycin in dextrose 5 % 1 gram/250 mL IVPB 1,000 mg (mg) 1,000 mg New Bag 06/09/22 1451                    Microbiologic Results:  Microbiology Results (last 7 days)       Procedure Component Value Units Date/Time    Blood culture #1 **CANNOT BE ORDERED STAT** [[id number]] Collected: 06/09/22 1305    Order Status: Completed Specimen: Blood from Peripheral, Hand, Right Updated: 06/11/22 1432     Blood Culture, Routine No Growth to date      No Growth to date      No Growth to date    Blood culture #2 **CANNOT BE ORDERED STAT** [[id number]] Collected: 06/09/22 1320    Order Status: Completed Specimen: Blood from Peripheral, Forearm, Left Updated: 06/11/22 1432     Blood Culture, Routine No Growth to date      No Growth to date      No Growth to date    Culture, Respiratory with Gram Stain [[id number]] Collected: 06/10/22 1036    Order Status: Completed Specimen: Respiratory from  Bronchial Wash, RLL Updated: 06/11/22 1357     Respiratory Culture No Growth     Gram Stain (Respiratory) <10 epithelial cells per low power field.     Gram Stain (Respiratory) Moderate WBC's     Gram Stain (Respiratory) No organisms seen    Narrative:      Micro wash    GIANLUCA prep [[id number]] Collected: 06/10/22 1036    Order Status: Completed Specimen: Respiratory from Bronchial Wash, RLL Updated: 06/10/22 1317     KOH Prep No yeast or fungal elements seen    Narrative:      Micro wash    AFB Culture & Smear [[id number]] Collected: 06/10/22 1036    Order Status: Sent Specimen: Respiratory from Bronchial Wash, RLL Updated: 06/10/22 1056    Fungus culture [[id number]] Collected: 06/10/22 1036    Order Status: Sent Specimen: Respiratory from Bronchial Wash, RLL Updated: 06/10/22 1056

## 2022-06-12 NOTE — PROGRESS NOTES
Nephrology Consult Note        Patient Name: Jia Vega  MRN: [id number]    Patient Class: IP- Inpatient   Admission Date: 6/9/2022  Length of Stay: 3 days  Date of Service: 6/12/2022    Attending Physician: Norm Leon MD  Primary Care Provider: Patrick Olson MD    Reason for Consult: hypercalcemia/hyponatremia/hypokalemia    SUBJECTIVE:     HPI: 71-year-old female who was admitted a few weeks ago with hypercalcemia with low PTH and what appeared to be a right lower lobe pneumonia. Treated as hypercalcemia of malignancy, received bisphosphonate.The patient was supposed to follow up after 2 weeks after her antibiotics with a new chest x-ray to see if infiltration cleared but she did not. She returns today complaining of more shortness of breath and now her CT clearly shows a very large mass with necrotic areas and adenopathy. She is again hypercalcemic. She received again therapy with bisphosphonate and IVF.    6/10 VSS, Bronchoscopy today. Add oral KCL.  6/11 VSS, Ca remains elevated. Patient has very little insight into her illness and prognosis.  6/12 VSS, no new complains, looks more coherent and awake.    Past Medical History:   Diagnosis Date    Arthritis     Asthma     Full dentures     GERD (gastroesophageal reflux disease)     Hypertension     Seasonal allergies     Wears glasses      Past Surgical History:   Procedure Laterality Date    BACK SURGERY      cubital tunnel release  left    HAND SURGERY      right and left: ganglion cyst    HYSTERECTOMY  BSO    NECK SURGERY       Family History   Family history unknown: Yes     Social History     Tobacco Use    Smoking status: Current Every Day Smoker     Packs/day: 0.25     Years: 40.00     Pack years: 10.00     Types: Cigarettes    Smokeless tobacco: Never Used    Tobacco comment: patient states 3 cigs per day   Substance Use Topics    Alcohol use: No     Alcohol/week: 1.7 standard drinks     Types: 2 Standard drinks or equivalent per  week    Drug use: No       Review of patient's allergies indicates:  No Known Allergies    Outpatient meds:  No current facility-administered medications on file prior to encounter.     Current Outpatient Medications on File Prior to Encounter   Medication Sig Dispense Refill    albuterol (PROVENTIL/VENTOLIN HFA) 90 mcg/actuation inhaler INHALE 1 PUFF BY MOUTH EVERY 4 TO 6 HOURS AS NEEDED FOR WHEEZING OR SHORTNESS OF BREATH (Patient taking differently: Inhale 1 puff into the lungs every 4 to 6 hours as needed. INHALE 1 PUFF BY MOUTH EVERY 4 TO 6 HOURS AS NEEDED FOR WHEEZING OR SHORTNESS OF BREATH) 8.5 g 2    aspirin (ECOTRIN) 81 MG EC tablet Take 1 tablet (81 mg total) by mouth Daily. 100 tablet 4    methylPREDNISolone (MEDROL DOSEPACK) 4 mg tablet       omeprazole (PRILOSEC) 40 MG capsule Take 1 capsule (40 mg total) by mouth once daily. 30 capsule 5    oxybutynin (DITROPAN) 5 MG Tab TAKE 1 TABLET BY MOUTH TWICE DAILY( WATCH FOR DRY MOUTH AND CONSTIPATION) (Patient taking differently: Take 5 mg by mouth 2 (two) times daily. TAKE 1 TABLET BY MOUTH TWICE DAILY( WATCH FOR DRY MOUTH AND CONSTIPATION)) 60 tablet 5    pravastatin (PRAVACHOL) 80 MG tablet Take 1 tablet (80 mg total) by mouth every evening. 90 tablet 0    predniSONE (DELTASONE) 20 MG tablet       sertraline (ZOLOFT) 50 MG tablet TAKE 1 TABLET(50 MG) BY MOUTH EVERY NIGHT (Patient taking differently: Take 50 mg by mouth every evening.) 90 tablet 0    VITAMIN B COMPLEX (B COMPLEX 1 ORAL) Take 1 tablet by mouth Daily.      guaiFENesin (MUCINEX) 600 mg 12 hr tablet Take 2 tablets (1,200 mg total) by mouth 2 (two) times daily. 20 tablet 0    multivitamin capsule Take 1 capsule by mouth once daily.      [DISCONTINUED] amlodipine-olmesartan (ALICE) 5-20 mg per tablet TAKE 1 TABLET BY MOUTH DAILY (Patient taking differently: Take 1 tablet by mouth once daily.) 90 tablet 1       Scheduled meds:   albuterol-ipratropium  3 mL Nebulization Q6H WAKE     aspirin  81 mg Oral Daily    dextromethorphan-guaiFENesin  mg  2 tablet Oral BID    meropenem (MERREM) IVPB  1 g Intravenous Q8H    methylPREDNISolone sodium succinate injection  40 mg Intravenous Daily    multivitamin  1 tablet Oral Daily    pantoprazole  40 mg Oral Daily    potassium chloride  20 mEq Oral BID    pravastatin  80 mg Oral QHS       Infusions:      PRN meds:  albuterol, benzonatate, guaiFENesin-codeine 100-10 mg/5 ml, LIDOcaine HCL 4%, melatonin, sodium chloride 0.9%, sodium chloride 0.9%    Review of Systems:    OBJECTIVE:     Vital Signs and IO (Last 24H):  Temp:  [97.3 °F (36.3 °C)-98.1 °F (36.7 °C)]   Pulse:  [75-94]   Resp:  [16-19]   BP: ()/(54-73)   SpO2:  [90 %-95 %]   I/O last 3 completed shifts:  In: 150 [IV Piggyback:150]  Out: 1250 [Urine:1250]    Wt Readings from Last 5 Encounters:   06/09/22 72.4 kg (159 lb 9.8 oz)   05/17/22 77.9 kg (171 lb 11.8 oz)   04/28/22 83.5 kg (184 lb)   05/25/21 93.9 kg (207 lb 0.2 oz)   05/07/21 93.9 kg (207 lb)     Physical Exam:  Physical Exam  Constitutional:       General: She is not in acute distress.     Appearance: She is well-developed. She is ill-appearing. She is not diaphoretic.   HENT:      Head: Normocephalic and atraumatic.   Eyes:      General: No scleral icterus.     Pupils: Pupils are equal, round, and reactive to light.   Cardiovascular:      Rate and Rhythm: Normal rate and regular rhythm.   Pulmonary:      Effort: Pulmonary effort is normal. No respiratory distress.      Breath sounds: No stridor.   Abdominal:      General: There is no distension.      Palpations: Abdomen is soft.   Musculoskeletal:         General: No deformity. Normal range of motion.      Cervical back: Neck supple.   Skin:     General: Skin is warm and dry.      Findings: No erythema or rash.   Neurological:      General: No focal deficit present.      Mental Status: She is alert.      Cranial Nerves: No cranial nerve deficit.   Psychiatric:          Behavior: Behavior normal.          Body mass index is 25 kg/m².    Laboratory:  Recent Labs   Lab 06/10/22  0511 06/11/22  0301 06/12/22  0514   * 134* 135*   K 3.3* 3.9 3.5   CL 91* 93* 96   CO2 33* 34* 30*   BUN 23 19 17   CREATININE 0.6 0.6 0.5   ESTGFRAFRICA >60.0 >60.0 >60.0   EGFRNONAA >60.0 >60.0 >60.0   * 105 93       Recent Labs   Lab 06/09/22  1040 06/09/22  2125 06/10/22  0511 06/11/22  0301 06/12/22  0514   CALCIUM 12.6*   < > 11.8* 11.0* 10.2   ALBUMIN 2.1*  --   --   --   --    PHOS 2.6*  --   --   --   --    MG 2.0  --   --   --   --     < > = values in this interval not displayed.       Recent Labs   Lab 05/16/22  0421 06/09/22  1040   PTH, Intact 4.7 L 3.7 L       Recent Labs   Lab 06/09/22  1040 06/10/22  0511 06/10/22  0945 06/11/22 0301 06/12/22  0514   WBC 14.09* 14.54*  --  14.60* 14.87*   HGB 13.4 12.2  --  11.9* 12.0   HCT 42.3 38.5 38 37.9 38.6   * 425  --  421 424   MCV 79* 78*  --  80* 81*   MCHC 31.7* 31.7*  --  31.4* 31.1*   MONO 6.1  0.9 4.5  0.7  --   --   --        Recent Labs   Lab 06/09/22  1040   BILITOT 0.9   PROT 7.0   ALBUMIN 2.1*   ALKPHOS 125   ALT 19   AST 29       Recent Labs   Lab 05/15/22  1816 06/09/22  0955   Color, UA Yellow Yellow   Appearance, UA Cloudy A Hazy A   pH, UA 6.0 6.0   Specific Gravity, UA 1.025 1.020   Protein, UA 2+ A 1+ A   Glucose, UA Negative Negative   Ketones, UA Negative 1+ A   Urobilinogen, UA 1.0 Negative   Bilirubin (UA) 1+ A Negative   Occult Blood UA 3+ A Negative   Nitrite, UA Negative Negative   RBC, UA 9 H 2   WBC, UA >100 H 2   Bacteria Occasional Rare   Hyaline Casts, UA 2370 A 8 A       Microbiology Results (last 7 days)     Procedure Component Value Units Date/Time    Culture, Respiratory with Gram Stain [[id number]] Collected: 06/10/22 1036    Order Status: Completed Specimen: Respiratory from Bronchial Wash, RLL Updated: 06/12/22 0805     Respiratory Culture No growth     Gram Stain (Respiratory) <10 epithelial  cells per low power field.     Gram Stain (Respiratory) Moderate WBC's     Gram Stain (Respiratory) No organisms seen    Narrative:      Micro wash    Blood culture #1 **CANNOT BE ORDERED STAT** [[id number]] Collected: 06/09/22 1305    Order Status: Completed Specimen: Blood from Peripheral, Hand, Right Updated: 06/11/22 1432     Blood Culture, Routine No Growth to date      No Growth to date      No Growth to date    Blood culture #2 **CANNOT BE ORDERED STAT** [[id number]] Collected: 06/09/22 1320    Order Status: Completed Specimen: Blood from Peripheral, Forearm, Left Updated: 06/11/22 1432     Blood Culture, Routine No Growth to date      No Growth to date      No Growth to date    GIANLUCA prep [[id number]] Collected: 06/10/22 1036    Order Status: Completed Specimen: Respiratory from Bronchial Wash, RLL Updated: 06/10/22 1317     KOH Prep No yeast or fungal elements seen    Narrative:      Micro wash    AFB Culture & Smear [[id number]] Collected: 06/10/22 1036    Order Status: Sent Specimen: Respiratory from Bronchial Wash, RLL Updated: 06/10/22 1056    Fungus culture [[id number]] Collected: 06/10/22 1036    Order Status: Sent Specimen: Respiratory from Bronchial Wash, RLL Updated: 06/10/22 1056        ASSESSMENT/PLAN:     Hypercalcemia due to advanced lung malignancy  Hyponatremia due to SIADH?  Hypokalemia due to poor oral intake?  Anemia  Keep MAP > 60, SBP > 100. Hold IVF for now.  Regular diet as tolerated. Limit oral water to 1.5L/day.  Check urine lytes and osmolality, add TSH.  Hgb and HCT are acceptable. Monitor. No GENE.    Thank you for allowing us to participate in the care of your patient!   We will follow the patient and provide recommendations as needed.    Patient care time was spent personally by me on the following activities:   · Obtaining a history.  · Examination of patient.  · Providing medical care at the patients bedside.  · Developing a treatment plan with patient or surrogate and bedside  caregivers.  · Ordering and reviewing laboratory studies, radiographic studies, pulse oximetry.  · Ordering and performing treatments and interventions.  · Evaluation of patient's response to treatment.  · Discussions with consultants while on the unit and immediately available to the patient.  · Re-evaluation of the patient's condition.  · Documentation in the medical record.     Damian Don MD    [facility]  [address] [place] [id number]    [phone] - tel  [phone] - fax    6/12/2022

## 2022-06-12 NOTE — RESPIRATORY THERAPY
06/11/22 2013   Patient Assessment/Suction   Level of Consciousness (AVPU) alert   Respiratory Effort Normal;Unlabored   Expansion/Accessory Muscles/Retractions no use of accessory muscles   All Lung Fields Breath Sounds equal bilaterally;coarse   Rhythm/Pattern, Respiratory unlabored   Cough Frequency frequent   Cough Type loose;productive   Secretions Amount small   Secretions Color pale;yellow   PRE-TX-O2   O2 Device (Oxygen Therapy) Vapotherm   Flow (L/min) 30   Oxygen Concentration (%) 60   SpO2 (!) 91 %   Pulse Oximetry Type Continuous   $ Pulse Oximetry - Multiple Charge Pulse Oximetry - Multiple   Pulse 80   Resp 18   Aerosol Therapy   $ Aerosol Therapy Charges Aerosol Treatment   Daily Review of Necessity (SVN) completed   Respiratory Treatment Status (SVN) given   Treatment Route (SVN) mask   Patient Position (SVN) HOB elevated   Post Treatment Assessment (SVN) breath sounds unchanged   Signs of Intolerance (SVN) none   Breath Sounds Post-Respiratory Treatment   Post-treatment Heart Rate (beats/min) 78   Post-treatment Resp Rate (breaths/min) 18   Ready to Wean/Extubation Screen   FIO2<=50 (chart decimal) (!) 0.6   Education   $ Education Bronchodilator;15 min   Respiratory Evaluation   $ Care Plan Tech Time 15 min   $ Eval/Re-eval Charges Re-evaluation   Evaluation For Re-Eval 3 day

## 2022-06-12 NOTE — PLAN OF CARE
06/12/22 0746   Patient Assessment/Suction   Level of Consciousness (AVPU) alert   Respiratory Effort Mild;Mouth breathing   Expansion/Accessory Muscles/Retractions no use of accessory muscles   All Lung Fields Breath Sounds clear;diminished   Rhythm/Pattern, Respiratory labored   Cough Frequency frequent   Cough Type dry;nonproductive   PRE-TX-O2   O2 Device (Oxygen Therapy) Vapotherm   $ Is the patient on High Flow Oxygen? Yes   $ Vapotherm Daily Charge Vapotherm Daily   Humidification temp set 35   Humidification temp actual 35   Flow (L/min) 30   Oxygen Concentration (%) 60   SpO2 (!) 93 %   Pulse Oximetry Type Continuous   $ Pulse Oximetry - Multiple Charge Pulse Oximetry - Multiple   Oximetry Probe Site Assessed   Pulse 93   Resp 16   Aerosol Therapy   $ Aerosol Therapy Charges Aerosol Treatment   Daily Review of Necessity (SVN) completed   Respiratory Treatment Status (SVN) given   Treatment Route (SVN) mask   Patient Position (SVN) HOB elevated   Post Treatment Assessment (SVN) breath sounds improved   Signs of Intolerance (SVN) none   Breath Sounds Post-Respiratory Treatment   Throughout All Fields Post-Treatment All Fields   Throughout All Fields Post-Treatment no change   Post-treatment Heart Rate (beats/min) 77   Post-treatment Resp Rate (breaths/min) 22   Education   $ Education Bronchodilator;15 min   Respiratory Evaluation   $ Care Plan Tech Time 15 min   $ Eval/Re-eval Charges Evaluation   Evaluation For Re-Eval 3 day

## 2022-06-12 NOTE — ASSESSMENT & PLAN NOTE
In the background of Lung cancer/COPD  Had Bronchoscopy on 06/10  Maintain Vapotherm  On Home oxygen   Follow up cytology reports  Ideally should be under hospice care asap

## 2022-06-13 NOTE — CONSULTS
"[facility]  Adult Nutrition   Consult Note (Initial Assessment)     SUMMARY     Recommendations  Recommendation/Intervention: 1) Add ice cream and benecalorie to Glucerna shakes. 2) Food preferences added to Computrition. 3) Encouraged po intake. 4) Menu  to obtain meal preferences  Goals: Po intake >75% of meals  Nutrition Goal Status: new    Dietitian Rounds Brief  Patient not feeling well but reports decreased po intake d/t altered taste. Pt denied N/V. Food preferences taken.    Diet order:   Current Diet Order: Regular   Oral Nutrition Supplement: Glucerna TID    Evaluation of Received Nutrient/Fluid Intake  Energy Calories Required: not meeting needs  Protein Required: not meeting needs  Tolerance: tolerating     % Intake of Estimated Energy Needs: 25 - 50 %  % Meal Intake: 25 - 50 %      Intake/Output Summary (Last 24 hours) at 6/13/2022 1721  Last data filed at 6/13/2022 0525  Gross per 24 hour   Intake 100 ml   Output --   Net 100 ml        Anthropometrics  Temp: 98.1 °F (36.7 °C)  Height Method: Stated  Height: 5' 7" (170.2 cm)  Height (inches): 67 in  Weight Method: Bed Scale  Weight: 72.4 kg (159 lb 9.8 oz)  Weight (lb): 159.61 lb  Ideal Body Weight (IBW), Female: 135 lb  % Ideal Body Weight, Female (lb): 118.23 %  BMI (Calculated): 25  BMI Grade: 25 - 29.9 - overweight       Estimated/Assessed Needs  Weight Used For Calorie Calculations: 72 kg (158 lb 11.7 oz)  Energy Calorie Requirements (kcal): 4532-1464 (25-30)  Energy Need Method: Kcal/kg  Protein Requirements: 87-108gm (1.2-1.5gm/kg)  Weight Used For Protein Calculations: 72 kg (158 lb 11.7 oz)     Estimated Fluid Requirement Method: RDA Method  RDA Method (mL): 1800       Reason for Assessment  Reason For Assessment: consult (FTT)  Diagnosis:  (Principal Acute hypoxemic respiratory failure)  Relevant Medical History: COPD, HTN, GERD    Nutrition/Diet History  Food Allergies: NKFA  Factors Affecting Nutritional Intake: " decreased appetite, altered taste    Nutrition Risk Screen  Nutrition Risk Screen: no indicators present     MST Score: 0  Have you recently lost weight without trying?: No  Weight loss score: 0  Have you been eating poorly because of a decreased appetite?: No  Appetite score: 0       Weight History:  Wt Readings from Last 5 Encounters:   06/09/22 72.4 kg (159 lb 9.8 oz)   05/17/22 77.9 kg (171 lb 11.8 oz)   04/28/22 83.5 kg (184 lb)   05/25/21 93.9 kg (207 lb 0.2 oz)   05/07/21 93.9 kg (207 lb)        Lab/Procedures/Meds: Pertinent Labs/Meds Reviewed    Medications:Pertinent Medications Reviewed  Scheduled Meds:   albuterol-ipratropium  3 mL Nebulization Q6H WAKE    aspirin  81 mg Oral Daily    cefUROXime  250 mg Oral Q12H    dextromethorphan-guaiFENesin  mg  2 tablet Oral BID    [START ON 6/14/2022] famotidine  20 mg Oral Daily    multivitamin  1 tablet Oral Daily    potassium chloride  20 mEq Oral BID    pravastatin  80 mg Oral QHS    [START ON 6/14/2022] predniSONE  40 mg Oral Daily     Continuous Infusions:  PRN Meds:.albuterol, benzonatate, guaiFENesin-codeine 100-10 mg/5 ml, LIDOcaine HCL 4%, melatonin, sodium chloride 0.9%, sodium chloride 0.9%    Labs: Pertinent Labs Reviewed  Clinical Chemistry:  Recent Labs   Lab 06/09/22  1040 06/09/22  2125 06/13/22  0727   *   < > 135*   K 3.0*   < > 3.9   CL 90*   < > 98   CO2 31*   < > 29   *   < > 103   BUN 26*   < > 18   CREATININE 0.7   < > 0.5   CALCIUM 12.6*   < > 9.9   PROT 7.0  --   --    ALBUMIN 2.1*  --   --    BILITOT 0.9  --   --    ALKPHOS 125  --   --    AST 29  --   --    ALT 19  --   --    ANIONGAP 13   < > 8   ESTGFRAFRICA >60.0   < > >60.0   EGFRNONAA >60.0   < > >60.0   MG 2.0  --   --    PHOS 2.6*  --   --    LIPASE 26  --   --     < > = values in this interval not displayed.     CBC:   Recent Labs   Lab 06/13/22  0727   WBC 17.04*   RBC 5.05   HGB 12.7   HCT 40.9      MCV 81*   MCH 25.1*   MCHC 31.1*     Lipid  Panel:  No results for input(s): CHOL, HDL, LDLCALC, TRIG, CHOLHDL in the last 168 hours.  Cardiac Profile:  Recent Labs   Lab 06/09/22  1040   BNP 94   CPK 14*   TROPONINI 0.037     Inflammatory Labs:  No results for input(s): CRP in the last 168 hours.  Diabetes:  No results for input(s): HGBA1C, POCTGLUCOSE in the last 168 hours.  Thyroid & Parathyroid:  Recent Labs   Lab 06/09/22  1040   TSH 1.250       Monitor and Evaluation  Food and Nutrient Intake: energy intake  Food and Nutrient Adminstration: diet order  Physical Activity and Function: nutrition-related ADLs and IADLs  Anthropometric Measurements: weight change  Biochemical Data, Medical Tests and Procedures: gastrointestinal profile, electrolyte and renal panel, glucose/endocrine profile  Nutrition-Focused Physical Findings: overall appearance     Nutrition Risk  Level of Risk/Frequency of Follow-up: high     Nutrition Follow-Up  RD Follow-up?: Yes      Chuyita Keane RD 06/13/2022 5:21 PM

## 2022-06-13 NOTE — PROGRESS NOTES
[facility] Medicine  Progress Note    Patient Name: Jia Vega  MRN: [id number]  Patient Class: IP- Inpatient   Admission Date: 6/9/2022  Length of Stay: 4 days  Attending Physician: Norm Leon MD  Primary Care Provider: Patrick Olson MD        Subjective:     Principal Problem:Acute hypoxemic respiratory failure        HPI:  71-year-old female with past medical history of GERD asthma COPD chronic active smoker hypertension she was discharged last month due to hypoxia shortness for breath hypercalcemia she was treated with IV fluids pulmonary the rate consult on per records was very difficult to treat her in the hospital due to noncompliance refusing treatment she received antibiotics for possible pneumonia on the right lower lobe of lung due to presence of infiltrate and she was sent home with antibiotics as well it is unclear if patient has been compliant with medications she was also scheduled to have follow-up with PCP and pulmonology as outpatient for workup for possible malignancy due to presence of significant infiltrate in the right lung with hypercalcemia at the time of previous hospitalization she was also seen by Nephrology she comes to the ER today because she states that ever since past discharge she still has weakness his Itz is able to get out of the bed and walk low p.o. intake shortness of breath beyond the back bilaterally family states that she has not always been compliant with oxygen home that was arranged at discharge last time when she arrived to the ER it was noticed that she was hypoxic and required 4 L as cannula to have saturations at 90% whole cause was slightly elevated she received LR meropenem vancomycin would did reside Duo[facility] she is going to be admitted to medical floor with telemetry.      Overview/Hospital Course:  06/10  Had Bronchoscopy today  Overall poor idea about her health condition and whats going on     06/11  Remains on Vapotherm  BP  levels low     06/12  Awaiting Pathology reports  Vitals stable  On bed all the time  Sats better     06/13  Patient's pathology is positive for a non small cell bronchogenic carcinoma  No new issues  Still on vapotherm  Remains on bed all time       Interval History:     Review of Systems   Constitutional:  Negative for activity change and appetite change.   HENT:  Negative for congestion and dental problem.    Eyes:  Negative for discharge and itching.   Respiratory:  Positive for shortness of breath.    Cardiovascular:  Negative for chest pain.   Gastrointestinal:  Negative for abdominal distention and abdominal pain.   Endocrine: Negative for cold intolerance.   Genitourinary:  Negative for difficulty urinating and dysuria.   Musculoskeletal:  Negative for arthralgias and back pain.   Skin:  Negative for color change.   Neurological:  Negative for dizziness and facial asymmetry.   Hematological:  Negative for adenopathy.   Psychiatric/Behavioral:  Negative for agitation and behavioral problems.    Objective:     Vital Signs (Most Recent):  Temp: 98.1 °F (36.7 °C) (06/13/22 1653)  Pulse: 86 (06/13/22 1653)  Resp: 18 (06/13/22 1653)  BP: 103/73 (06/13/22 1653)  SpO2: (!) 94 % (06/13/22 1653)   Vital Signs (24h Range):  Temp:  [97.8 °F (36.6 °C)-98.4 °F (36.9 °C)] 98.1 °F (36.7 °C)  Pulse:  [79-91] 86  Resp:  [17-18] 18  SpO2:  [92 %-97 %] 94 %  BP: ()/(62-78) 103/73     Weight: 72.4 kg (159 lb 9.8 oz)  Body mass index is 25 kg/m².    Intake/Output Summary (Last 24 hours) at 6/13/2022 1813  Last data filed at 6/13/2022 0525  Gross per 24 hour   Intake 100 ml   Output --   Net 100 ml      Physical Exam  Vitals and nursing note reviewed.   Constitutional:       General: She is not in acute distress.  HENT:      Head: Atraumatic.      Right Ear: External ear normal.      Left Ear: External ear normal.      Nose: Nose normal.      Mouth/Throat:      Mouth: Mucous membranes are moist.   Cardiovascular:      Rate  and Rhythm: Normal rate.   Pulmonary:      Effort: Pulmonary effort is normal.      Breath sounds: Normal breath sounds.   Abdominal:      General: There is no distension.   Musculoskeletal:         General: Normal range of motion.      Cervical back: Normal range of motion.   Skin:     General: Skin is warm.   Neurological:      Mental Status: She is alert and oriented to person, place, and time.   Psychiatric:         Behavior: Behavior normal.       Significant Labs: All pertinent labs within the past 24 hours have been reviewed.  CBC:   Recent Labs   Lab 06/12/22 0514 06/13/22 0727   WBC 14.87* 17.04*   HGB 12.0 12.7   HCT 38.6 40.9    425     CMP:   Recent Labs   Lab 06/12/22 0514 06/13/22 0727   * 135*   K 3.5 3.9   CL 96 98   CO2 30* 29   GLU 93 103   BUN 17 18   CREATININE 0.5 0.5   CALCIUM 10.2 9.9   ANIONGAP 9 8   EGFRNONAA >60.0 >60.0       Significant Imaging: I have reviewed all pertinent imaging results/findings within the past 24 hours.      Assessment/Plan:      * Acute hypoxemic respiratory failure  In the background of Lung cancer/COPD  Had Bronchoscopy on 06/10  Maintain Vapotherm and wean as tolerated   On Home oxygen   Patient's pathology is positive for a non small cell bronchogenic carcinoma  Ideally should be under hospice care asap    Lung cancer  Patient's pathology is positive for a non small cell bronchogenic carcinoma      Noncompliance  Ongoing issue  Also insight is very poor     Hyponatremia  Mostly SIADH in the background of lung lesion      Hypercalcemia  Hypercalcemia of malignancy      Chronic obstructive pulmonary disease  Maintain Present Rx        VTE Risk Mitigation (From admission, onward)         Ordered     IP VTE HIGH RISK PATIENT  Once         06/09/22 1429     Place sequential compression device  Until discontinued         06/09/22 1429                Discharge Planning   NIRMAL: 6/14/2022     Code Status: Full Code   Is the patient medically ready for  discharge?: No    Reason for patient still in hospital (select all that apply): Treatment  Discharge Plan A: Home with family                  Norm Leon MD  Department of Hospital Medicine   [facility]

## 2022-06-13 NOTE — SUBJECTIVE & OBJECTIVE
Interval History:     Review of Systems   Constitutional:  Negative for activity change and appetite change.   HENT:  Negative for congestion and dental problem.    Eyes:  Negative for discharge and itching.   Respiratory:  Positive for shortness of breath.    Cardiovascular:  Negative for chest pain.   Gastrointestinal:  Negative for abdominal distention and abdominal pain.   Endocrine: Negative for cold intolerance.   Genitourinary:  Negative for difficulty urinating and dysuria.   Musculoskeletal:  Negative for arthralgias and back pain.   Skin:  Negative for color change.   Neurological:  Negative for dizziness and facial asymmetry.   Hematological:  Negative for adenopathy.   Psychiatric/Behavioral:  Negative for agitation and behavioral problems.    Objective:     Vital Signs (Most Recent):  Temp: 98.1 °F (36.7 °C) (06/13/22 1653)  Pulse: 86 (06/13/22 1653)  Resp: 18 (06/13/22 1653)  BP: 103/73 (06/13/22 1653)  SpO2: (!) 94 % (06/13/22 1653)   Vital Signs (24h Range):  Temp:  [97.8 °F (36.6 °C)-98.4 °F (36.9 °C)] 98.1 °F (36.7 °C)  Pulse:  [79-91] 86  Resp:  [17-18] 18  SpO2:  [92 %-97 %] 94 %  BP: ()/(62-78) 103/73     Weight: 72.4 kg (159 lb 9.8 oz)  Body mass index is 25 kg/m².    Intake/Output Summary (Last 24 hours) at 6/13/2022 1813  Last data filed at 6/13/2022 0525  Gross per 24 hour   Intake 100 ml   Output --   Net 100 ml      Physical Exam  Vitals and nursing note reviewed.   Constitutional:       General: She is not in acute distress.  HENT:      Head: Atraumatic.      Right Ear: External ear normal.      Left Ear: External ear normal.      Nose: Nose normal.      Mouth/Throat:      Mouth: Mucous membranes are moist.   Cardiovascular:      Rate and Rhythm: Normal rate.   Pulmonary:      Effort: Pulmonary effort is normal.      Breath sounds: Normal breath sounds.   Abdominal:      General: There is no distension.   Musculoskeletal:         General: Normal range of motion.      Cervical back:  Normal range of motion.   Skin:     General: Skin is warm.   Neurological:      Mental Status: She is alert and oriented to person, place, and time.   Psychiatric:         Behavior: Behavior normal.       Significant Labs: All pertinent labs within the past 24 hours have been reviewed.  CBC:   Recent Labs   Lab 06/12/22  0514 06/13/22  0727   WBC 14.87* 17.04*   HGB 12.0 12.7   HCT 38.6 40.9    425     CMP:   Recent Labs   Lab 06/12/22  0514 06/13/22  0727   * 135*   K 3.5 3.9   CL 96 98   CO2 30* 29   GLU 93 103   BUN 17 18   CREATININE 0.5 0.5   CALCIUM 10.2 9.9   ANIONGAP 9 8   EGFRNONAA >60.0 >60.0       Significant Imaging: I have reviewed all pertinent imaging results/findings within the past 24 hours.

## 2022-06-13 NOTE — CARE UPDATE
06/13/22 0740   Patient Assessment/Suction   Level of Consciousness (AVPU) alert   Respiratory Effort Unlabored   Expansion/Accessory Muscles/Retractions expansion symmetric   All Lung Fields Breath Sounds Anterior:;diminished   Rhythm/Pattern, Respiratory unlabored   Cough Frequency frequent   Cough Type nonproductive   PRE-TX-O2   O2 Device (Oxygen Therapy) Vapotherm   $ Is the patient on High Flow Oxygen? Yes   Humidification temp set 35   Humidification temp actual 35   Flow (L/min) 30   Oxygen Concentration (%) 65   SpO2 (!) 92 %   Pulse Oximetry Type Continuous   $ Pulse Oximetry - Multiple Charge Pulse Oximetry - Multiple   Pulse 89   Resp 18   Aerosol Therapy   $ Aerosol Therapy Charges Aerosol Treatment   Daily Review of Necessity (SVN) completed   Respiratory Treatment Status (SVN) given   Treatment Route (SVN) mask   Patient Position (SVN) HOB elevated   Post Treatment Assessment (SVN) breath sounds unchanged   Signs of Intolerance (SVN) none   Breath Sounds Post-Respiratory Treatment   Throughout All Fields Post-Treatment All Fields   Throughout All Fields Post-Treatment Anterior:;diminished   Post-treatment Heart Rate (beats/min) 85   Post-treatment Resp Rate (breaths/min) 20   Education   $ Education Bronchodilator;15 min   Respiratory Evaluation   $ Care Plan Tech Time 15 min   $ Eval/Re-eval Charges Evaluation

## 2022-06-13 NOTE — PROGRESS NOTES
Hypercalcemia resolved  Renal function stable  Sodium stable    Keep euvolemic  No thiazide diuretics    Call if further assistance is needed.  Thank you for the referral     Graham Massey MD  Nephrology  [facility]  [phone]

## 2022-06-13 NOTE — ASSESSMENT & PLAN NOTE
In the background of Lung cancer/COPD  Had Bronchoscopy on 06/10  Maintain Vapotherm and wean as tolerated   On Home oxygen   Patient's pathology is positive for a non small cell bronchogenic carcinoma  Ideally should be under hospice care asap

## 2022-06-13 NOTE — PROGRESS NOTES
Pulmonary/Critical Care progress note      PATIENT NAME: Jia Vega  MRN: [id number]  TODAY'S DATE: 2022  4:32 PM  ADMIT DATE: 2022  AGE: 71 y.o. : 1950      HPI:  Patient is a 71-year-old female who was admitted a few weeks ago with hypercalcemia what appeared to be a right lower lobe pneumonia.  The patient was supposed to follow up after 2 weeks after her antibiotics with a new chest x-ray to see if cm infiltrating cleared but did not.  She returns today complaining of more shortness of breath and now her CT clearly shows a very large mass with necrotic areas and adenopathy.  She is again hypercalcemic.  When asked why the patient in follow-up in the office, she states she for got.  She states she is not resume smoking.  She just does not have a taste for it any longer.    6/10 the patient consented to undergo bronchoscopy.  She is still not sure if she would undergo chemotherapy if her or offered to her.  She is now on Vapotherm following her procedure.     the patient's pathology is positive for a non small cell bronchogenic carcinoma.  I feel certain this is going to be a squamous cell given her hypercalcemia.  The patient is still requiring significant Vapotherm to oxygenate.  I do not think she is going to be a chemotherapy or radiation candidate.  No chemo because of her very poor performance status and no radiation because there is not a localized focus of tumor that is causing a problem at this time.  The patient wants to go home.  She refuses to go to a skilled nursing facility.  She is requiring too much oxygen to go home.  She is upset with her diagnosis, despite the fact that I have been telling her for a week now that this was likely cancer.    REVIEW OF SYSTEMS  GENERAL: Feeling weak.  EYES: Vision is good.  ENT: No sinusitis or pharyngitis.   HEART: No chest pain or palpitations.  LUNGS:  She is coughing and now having some streaky hemoptysis.  She short of breath with  movement.  GI: No Nausea, vomiting, constipation, diarrhea, or reflux.  : No dysuria, hesitancy, or nocturia.  SKIN: No lesions or rashes.  MUSCULOSKELETAL: No joint pain or myalgias.  NEURO: No headaches or neuropathy.  LYMPH: No edema or adenopathy.  PSYCH: No anxiety or depression.  ENDO: No weight change.    No change in the patient's Past Medical History, Past Surgical History, Social History or Family History since admission.        VITAL SIGNS (MOST RECENT)  Temp: 97.8 °F (36.6 °C) (06/13/22 1110)  Pulse: 91 (06/13/22 1307)  Resp: 18 (06/13/22 1307)  BP: 107/69 (06/13/22 1110)  SpO2: 96 % (06/13/22 1307)    INTAKE AND OUTPUT (LAST 24 HOURS):    Intake/Output Summary (Last 24 hours) at 6/13/2022 1324  Last data filed at 6/13/2022 0525  Gross per 24 hour   Intake 100 ml   Output --   Net 100 ml       WEIGHT  Wt Readings from Last 1 Encounters:   06/09/22 72.4 kg (159 lb 9.8 oz)       PHYSICAL EXAM  GENERAL: Older patient, crying with her diagnosis.  HEENT: Pupils equal and reactive. Extraocular movements intact. Nose intact. Pharynx moist.  NECK: Supple.   HEART: Regular rate and rhythm. No murmur or gallop auscultated.  LUNGS:  There are no breath sounds in the right base or the right lower anterior thorax either.. Lung excursion symmetrical. No change in fremitus.   ABDOMEN: Bowel sounds present. Non-tender, no masses palpated.  : Normal anatomy.  EXTREMITIES: Normal muscle tone and joint movement, no cyanosis or clubbing.   LYMPHATICS: No adenopathy palpated, no edema.  SKIN: Dry, intact, no lesions.   NEURO: Cranial nerves II-XII intact. Motor strength 5/5 bilaterally, upper and lower extremities.  PSYCH: Appropriate affect      CBC LAST (LAST 24 HOURS)  Recent Labs   Lab 06/13/22  0727   WBC 17.04*   RBC 5.05   HGB 12.7   HCT 40.9   MCV 81*   MCH 25.1*   MCHC 31.1*   RDW 17.6*      MPV 9.5       CHEMISTRY LAST (LAST 24 HOURS)  Recent Labs   Lab 06/13/22  0727   *   K 3.9   CL 98   CO2 29    ANIONGAP 8   BUN 18   CREATININE 0.5      CALCIUM 9.9    Corrected calcium 13.3      CARDIAC PROFILE (LAST 24 HOURS)  Recent Labs   Lab 06/09/22  1040   BNP 94   CPK 14*   TROPONINI 0.037       LAST 7 DAYS MICROBIOLOGY   Microbiology Results (last 7 days)     Procedure Component Value Units Date/Time    AFB Culture & Smear [[id number]] Collected: 06/10/22 1036    Order Status: Completed Specimen: Respiratory from Bronchial Wash, RLL Updated: 06/13/22 0856     AFB CULTURE STAIN No acid fast bacilli seen.     AFB CULTURE STAIN Testing performed by:     AFB CULTURE STAIN Lab [facility] [place]     AFB CULTURE STAIN [address] [facility]     [place] [address]     AFB CULTURE STAIN Dr.Brian Arin MD    Narrative:      Micro wash    Blood culture #2 **CANNOT BE ORDERED STAT** [[id number]] Collected: 06/09/22 1320    Order Status: Completed Specimen: Blood from Peripheral, Forearm, Left Updated: 06/12/22 1432     Blood Culture, Routine No Growth to date      No Growth to date      No Growth to date      No Growth to date    Blood culture #1 **CANNOT BE ORDERED STAT** [[id number]] Collected: 06/09/22 1305    Order Status: Completed Specimen: Blood from Peripheral, Hand, Right Updated: 06/12/22 1432     Blood Culture, Routine No Growth to date      No Growth to date      No Growth to date      No Growth to date    Culture, Respiratory with Gram Stain [[id number]] Collected: 06/10/22 1036    Order Status: Completed Specimen: Respiratory from Bronchial Wash, RLL Updated: 06/12/22 1403     Respiratory Culture No growth     Gram Stain (Respiratory) <10 epithelial cells per low power field.     Gram Stain (Respiratory) Moderate WBC's     Gram Stain (Respiratory) No organisms seen    Narrative:      Micro wash    GIANLUCA prep [[id number]] Collected: 06/10/22 1036    Order Status: Completed Specimen: Respiratory from Bronchial Wash, RLL Updated: 06/10/22 1317     KOH Prep No yeast or fungal elements seen     Narrative:      Micro wash    Fungus culture [[id number]] Collected: 06/10/22 1036    Order Status: Sent Specimen: Respiratory from Bronchial Wash, RLL Updated: 06/10/22 1056          MOST RECENT IMAGING  X-Ray Chest AP Portable  HISTORY: post bronchoscopy with biopsy    FINDINGS: Portable chest radiograph at 1007 hours compared to 06/09/2022 shows the cardiomediastinal silhouette and pulmonary vasculature are stable, with aortic vascular calcifications.    There are right mid and lower lung airspace opacities, not significantly changed, with scattered reticulonodular densities in both lungs. There is no pneumothorax.    IMPRESSION: Negative for pneumothorax post bronchoscopy and biopsy.    Electronically signed by:  Carlos Earl MD  6/10/2022 10:44 AM CDT Workstation: [id number]  FL Flouro Usage  See Notes    This procedure was auto-finalized.      CURRENT VISIT EKG  Results for orders placed or performed during the hospital encounter of 06/09/22   EKG 12-lead    Narrative    Test Reason : R06.02,    Vent. Rate : 096 BPM     Atrial Rate : 096 BPM     P-R Int : 126 ms          QRS Dur : 086 ms      QT Int : 368 ms       P-R-T Axes : 080 045 029 degrees     QTc Int : 464 ms    Normal sinus rhythm  Normal ECG  When compared with ECG of 15-MAY-2022 17:42,  Fusion complexes are no longer Present    Referred By: [email or web address]   SELF           Confirmed By:        ECHOCARDIOGRAM RESULTS  No results found for this or any previous visit.    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  Currently only diagnosed as the non-small cell carcinoma  Hypercalcemia, better  COPD  Some component of postobstructive pneumonia?      Track calcium  Bronchodilators  Oxygen to  keep sats 90% or better, wean Vapotherm as tolerated  Change Merrem to Ceftin  Change Solu-Medrol to prednisone  Chest x-ray in a.m.  Consult rad Onc  Consult heme Onc  After these physicians have met with the patient, we will have a better idea of how we need to proceed  Answered all of her and her 's questions      Kimberly Telelz MD  [facility]  Department of Pulmonology  Date of Service: 06/13/2022  4:32 PM

## 2022-06-13 NOTE — RESPIRATORY THERAPY
06/12/22 2000   Patient Assessment/Suction   Level of Consciousness (AVPU) alert   Respiratory Effort Normal;Unlabored   Expansion/Accessory Muscles/Retractions no use of accessory muscles   All Lung Fields Breath Sounds equal bilaterally;diminished   Rhythm/Pattern, Respiratory unlabored   Cough Frequency frequent   Cough Type fair;dry;nonproductive   PRE-TX-O2   O2 Device (Oxygen Therapy) Vapotherm   $ Is the patient on Low Flow Oxygen? Yes   Humidification temp set 35   Humidification temp actual 35   Flow (L/min) 30   Oxygen Concentration (%) 65   SpO2 95 %   Pulse Oximetry Type Continuous   $ Pulse Oximetry - Multiple Charge Pulse Oximetry - Multiple   Pulse 89   Resp 18   Aerosol Therapy   $ Aerosol Therapy Charges Aerosol Treatment   Daily Review of Necessity (SVN) completed   Respiratory Treatment Status (SVN) given   Treatment Route (SVN) mask   Patient Position (SVN) HOB elevated   Post Treatment Assessment (SVN) breath sounds unchanged   Signs of Intolerance (SVN) none   Breath Sounds Post-Respiratory Treatment   Post-treatment Heart Rate (beats/min) 88   Post-treatment Resp Rate (breaths/min) 20   Ready to Wean/Extubation Screen   FIO2<=50 (chart decimal) (!) 0.65   Education   $ Education Bronchodilator;DME Oxygen;15 min   Respiratory Evaluation   $ Care Plan Tech Time 15 min   $ Eval/Re-eval Charges Re-evaluation   Evaluation For Re-Eval 3 day

## 2022-06-13 NOTE — CARE UPDATE
06/13/22 0740   Patient Assessment/Suction   Level of Consciousness (AVPU) alert   Respiratory Effort Unlabored   Expansion/Accessory Muscles/Retractions expansion symmetric   All Lung Fields Breath Sounds Anterior:;diminished   Rhythm/Pattern, Respiratory unlabored   Cough Frequency frequent   Cough Type nonproductive   PRE-TX-O2   O2 Device (Oxygen Therapy) Vapotherm   $ Is the patient on High Flow Oxygen? Yes   $ Vapotherm Daily Charge Vapotherm Daily   Humidification temp set 35   Humidification temp actual 35   Flow (L/min) 30   Oxygen Concentration (%) 65   SpO2 (!) 92 %   Pulse Oximetry Type Continuous   $ Pulse Oximetry - Multiple Charge Pulse Oximetry - Multiple   Pulse 89   Resp 18   Aerosol Therapy   $ Aerosol Therapy Charges Aerosol Treatment   Daily Review of Necessity (SVN) completed   Respiratory Treatment Status (SVN) given   Treatment Route (SVN) mask   Patient Position (SVN) HOB elevated   Post Treatment Assessment (SVN) breath sounds unchanged   Signs of Intolerance (SVN) none   Breath Sounds Post-Respiratory Treatment   Throughout All Fields Post-Treatment All Fields   Throughout All Fields Post-Treatment Anterior:;diminished   Post-treatment Heart Rate (beats/min) 85   Post-treatment Resp Rate (breaths/min) 20   Education   $ Education Bronchodilator;15 min   Respiratory Evaluation   $ Care Plan Tech Time 15 min   $ Eval/Re-eval Charges Evaluation

## 2022-06-13 NOTE — NURSING
Pts son and daughter in law has some concerns about communication , dad is having trouble remembering . They would like to get an call for updates     Rich  [phone]   Brooke [phone]

## 2022-06-13 NOTE — PLAN OF CARE
Pt compliant with POC. RR even and unlabored. No signs of distress noted, will continue to monitor.     Problem: Adult Inpatient Plan of Care  Goal: Plan of Care Review  Outcome: Ongoing, Progressing  Goal: Patient-Specific Goal (Individualized)  Outcome: Ongoing, Progressing  Goal: Absence of Hospital-Acquired Illness or Injury  Outcome: Ongoing, Progressing  Goal: Optimal Comfort and Wellbeing  Outcome: Ongoing, Progressing  Goal: Readiness for Transition of Care  Outcome: Ongoing, Progressing     Problem: Adjustment to Illness (Sepsis/Septic Shock)  Goal: Optimal Coping  Outcome: Ongoing, Progressing     Problem: Bleeding (Sepsis/Septic Shock)  Goal: Absence of Bleeding  Outcome: Ongoing, Progressing

## 2022-06-14 PROBLEM — J96.01 ACUTE HYPOXEMIC RESPIRATORY FAILURE: Status: RESOLVED | Noted: 2022-01-01 | Resolved: 2022-01-01

## 2022-06-14 PROBLEM — E83.52 HYPERCALCEMIA: Status: RESOLVED | Noted: 2022-01-01 | Resolved: 2022-01-01

## 2022-06-14 PROBLEM — E87.1 HYPONATREMIA: Status: RESOLVED | Noted: 2022-01-01 | Resolved: 2022-01-01

## 2022-06-14 NOTE — CARE UPDATE
CM spoke with RT [place]  And patient tolerated O2 high flow nasal cannula at 8 liters nasal cannula. CM contacted [facility], spoke with [place]. Aly, admit nurse will reach out to family. [facility] can accommodate 10 liter high flow O2.

## 2022-06-14 NOTE — PLAN OF CARE
Discharge disposition changed to home with hospice (see previous notes).         06/14/22 1127   Discharge Reassessment   Assessment Type Discharge Planning Reassessment   Did the patient's condition or plan change since previous assessment? Yes   Discharge Plan discussed with: Spouse/sig other;Patient   Name(s) and Number(s) Dami   Discharge Plan A Hospice/home   Discharge Plan B Hospice/home   DME Needed Upon Discharge  nebulizer;oxygen;hospital bed;suction machine   Discharge Barriers Identified None   Post-Acute Status   Post-Acute Authorization Hospice   Hospice Status Pending post acute provider review/more information requested   Discharge Delays (!) Change in Medical Condition

## 2022-06-14 NOTE — CONSULTS
Inpatient consult to Radiation Oncology  Consult performed by: Abdulaziz Gandara Jr., MD  Consult ordered by: Ravin Mcclellan MD    Inpatient consult to Radiation Oncology  Consult performed by: Abdulaziz Gandara Jr., MD  Consult ordered by: Kimberly Tellez MD        Jia Vega  [id number]  1950 6/14/2022  Aaareferral Self  No address on file    REASON FOR CONSULTATION: [id number](M1?) poor diff SCCa RLL    TREATMENT GOAL: palliative    HISTORY OF PRESENT ILLNESS:   Jia Vega is a 71 y.o. female with >100 pack year smoking history, recently discontinued presented to the emergency department with shortness of breath and approximate 50 lb weight loss. CT of the chest demonstrated dense consolidation in the right lower lobe with mildly prominent mediastinal and right hilar adenopathy, suspicious for pneumonia.  She was treated with antibiotics for COPD exacerbation and recommended to follow-up with pulmonology as an outpatient given concern for underlying malignancy.  She was a no show for this appointment.    She re-presented to the hospital with worsening shortness of breath and hypercalcemia with CTA chest + CT A/P demonstrating consolidation of the majority of the right lower lobe with airway opacification and mediastinal lymphadenopathy (pretracheal, precarinal, right hilar, subcarinal) as well as nonspecific stable bilateral adrenal thickening, enhancing nodule in the anterior pancreatic body and indeterminate structure at the left kidney.    Dr. Tellez took the patient for bronchoscopy noting obstruction of the right middle lobe and right lower lobe orifices.  Transbronchial biopsy, washing and brushings returned poorly differentiated non-small cell lung cancer, favoring SCCA.    That the parking feels patient is a poor candidate for systemic or radiotherapy due to poor KPS in diffuse nature of tumor.  Patient is refusing skilled nursing discharge.  Radiation and medical oncology a  consulted for plan of care.  She remains on vapotherm, bronchodilators, antibiotics, steroids.    Today patient expresses a desire to go home.  She is not interested in transitional facility.  She endorses shortness of breath, improved on Abx and vapotherm.  She discontinued smoking 2 months ago.  She denies headache , seizure activity , focal numbness or weakness, speech or cognition changes.  At our visit her  and grandson are present.  Her  recently completed radiotherapy for lung cancer.    Review of systems otherwise negative unless indicated in HPI.    Past Medical History:   Diagnosis Date    Arthritis     Asthma     Full dentures     GERD (gastroesophageal reflux disease)     Hypertension     Seasonal allergies     Wears glasses      Past Surgical History:   Procedure Laterality Date    BACK SURGERY      BRONCHOSCOPY WITH FLUOROSCOPY N/A 6/10/2022    Procedure: BRONCHOSCOPY, WITH FLUOROSCOPY;  Surgeon: Kimberly Tellez MD;  Location: [facility];  Service: Pulmonary;  Laterality: N/A;    cubital tunnel release  left    HAND SURGERY      right and left: ganglion cyst    HYSTERECTOMY  BSO    NECK SURGERY       Social History     Socioeconomic History    Marital status:     Number of children: 2   Occupational History    Occupation: Seamstress retired   Tobacco Use    Smoking status: Current Every Day Smoker     Packs/day: 0.25     Years: 40.00     Pack years: 10.00     Types: Cigarettes    Smokeless tobacco: Never Used    Tobacco comment: patient states 3 cigs per day   Substance and Sexual Activity    Alcohol use: No     Alcohol/week: 1.7 standard drinks     Types: 2 Standard drinks or equivalent per week    Drug use: No    Sexual activity: Not Currently   Social History Narrative    2 boys from previous relationship     Family History   Family history unknown: Yes       PRIOR HISTORY OF CHEMOTHERAPY OR RADIOTHERAPY: Please see HPI for patients prior oncologic  history.    Current Discharge Medication List      CONTINUE these medications which have NOT CHANGED    Details   albuterol (PROVENTIL/VENTOLIN HFA) 90 mcg/actuation inhaler INHALE 1 PUFF BY MOUTH EVERY 4 TO 6 HOURS AS NEEDED FOR WHEEZING OR SHORTNESS OF BREATH  Qty: 8.5 g, Refills: 2    Associated Diagnoses: Bronchitis with bronchospasm; Panlobular emphysema      aspirin (ECOTRIN) 81 MG EC tablet Take 1 tablet (81 mg total) by mouth Daily.  Qty: 100 tablet, Refills: 4    Associated Diagnoses: Hypercholesterolemia      methylPREDNISolone (MEDROL DOSEPACK) 4 mg tablet       omeprazole (PRILOSEC) 40 MG capsule Take 1 capsule (40 mg total) by mouth once daily.  Qty: 30 capsule, Refills: 5    Associated Diagnoses: Gastroesophageal reflux disease, unspecified whether esophagitis present      oxybutynin (DITROPAN) 5 MG Tab TAKE 1 TABLET BY MOUTH TWICE DAILY( WATCH FOR DRY MOUTH AND CONSTIPATION)  Qty: 60 tablet, Refills: 5    Associated Diagnoses: Mixed stress and urge urinary incontinence      pravastatin (PRAVACHOL) 80 MG tablet Take 1 tablet (80 mg total) by mouth every evening.  Qty: 90 tablet, Refills: 0    Comments: Limited prescription has been called in with or without additional refill. Patient needs to set up a follow-up appointment which is overdue now.  Associated Diagnoses: Hypercholesterolemia      predniSONE (DELTASONE) 20 MG tablet       sertraline (ZOLOFT) 50 MG tablet TAKE 1 TABLET(50 MG) BY MOUTH EVERY NIGHT  Qty: 90 tablet, Refills: 0    Comments: Limited prescription has been called in with or without additional refill. Patient needs to set up a follow-up appointment which is overdue now.  Associated Diagnoses: Dysthymia      VITAMIN B COMPLEX (B COMPLEX 1 ORAL) Take 1 tablet by mouth Daily.      guaiFENesin (MUCINEX) 600 mg 12 hr tablet Take 2 tablets (1,200 mg total) by mouth 2 (two) times daily.  Qty: 20 tablet, Refills: 0    Associated Diagnoses: Panlobular emphysema; Bronchitis with bronchospasm       multivitamin capsule Take 1 capsule by mouth once daily.         STOP taking these medications       amlodipine-olmesartan (ALICE) 5-20 mg per tablet Comments:   Reason for Stopping:             Review of patient's allergies indicates:  No Known Allergies    QUALITY OF LIFE: 40%- Disabled: Requires Special Care and Assistance    Vitals:    06/13/22 2331 06/14/22 0332 06/14/22 0725 06/14/22 0808   BP: 120/69 111/78  121/83   Pulse: 78 87 85 104   Resp: 18 17 16 16   Temp: 98 °F (36.7 °C) 97.8 °F (36.6 °C)  98.7 °F (37.1 °C)   TempSrc: Oral Oral  Oral   SpO2: (!) 94% (!) 94% (!) 94% (!) 92%   Weight:       Height:         Body mass index is 25 kg/m².    PHYSICAL EXAM:   GENERAL: alert; in no apparent distress.   HEAD: normocephalic, atraumatic.  EYES: pupils are equal, round, reactive to light and accommodation. Sclera anicteric. Conjunctiva not injected.   NOSE/THROAT: no nasal erythema or rhinorrhea.   NECK: no cervical motion rigidity; supple with no masses.    CHEST: Patient is on vapotherm with normal work of breathing, occasional nonproductive cough  CARDIOVASCULAR: regular rate and rhythm  ABDOMEN: soft, nontender, nondistended.   MUSCULOSKELETAL: no tenderness to palpation along the spine or scapulae. Normal range of motion.  NEUROLOGIC: cranial nerves II-XII intact bilaterally. Strength 5/5 in bilateral upper and lower extremities. No sensory deficits appreciated.   EXTREMITIES: + clubbing; no cyanosis, edema.  SKIN: no erythema, rashes, ulcerations noted.     REVIEW OF IMAGING/PATHOLOGY/LABS: Please see HPI. All images reviewed personally by dictating physician.     ASSESSMENT: Jia Vega is a 71 y.o. female with stage [id number](M1?) poor diff SCCa RLL.    Jia Vega presents as above.    1.  Inpatient management and disposition per hospitalist team.  2.  Imaging reviewed.  Suspicious for metastatic process (at pancreas, adrenals?).  Within the chest, bulky N2-3 with obstructive  perihilar mass and significant postobstructive atelectasis and consolidation.  This is marginally worsened compared to CT imaging from 1 month ago. Symptoms improviong on Abx, vapotherm, steroids.  3.  Due to presence of these findings on May exam, I do not anticipate emergent radiotherapy to reverse this process.  Additionally in the absence of outpatient PET-CT, there is no easily definable target.  I do not recommend inpatient radiotherapy.  4.  Patient has met with Dr. Abebe and is refusing systemic therapy which for N2+ or M1 disease, renders diagnosis terminable and other measures will be palliative in nature.  5. With current KPS, not a candidate for anti-neoplastic therapies. Agree with palliative care/hospice discussion and at least consideration of transitional care facility post-d/c.  6. If she wishes to pursue therapy, recommend completion staging MRI, outpatient PET. This would likely be followed by palliative intent radiotherapy, 30Gy/10frx to R hilar disease (as best delineated on PET/CT).   7. Contact info provided. Will follow up at [facility] as an outpatient.  8. Case d/w Dr. Tellez.     The patient has our contact information and understands that they are free to contact us at any time with questions or concerns regarding radiation therapy.     DISPOSITION: Outpatient f/u +/- CT SIM after discharge     I have personally seen and evaluated this patient with a high complexity diagnosis.      Greater than 60 minutes were dedicated to reviewing/interpreting pertinent laboratory/imaging/pathology as well as prior consultations; reviewing and performing history and physical; counseling patient on oncologic recommendations; documentation in the electronic medical record including ordering of additional tests and/or radiation treatment protocol; and coordination of care with physicians with referrals placed as appropriate.     COVID-19 precautions discussed. Cancer Center policy for COVID-19 testing  described.  Patient will be required to wear a mask when in the Cancer Center.    PHYSICIAN: Abdulaziz Gandara Jr, MD    Thank you for the opportunity to meet and consult with Jia Vega.   Please feel free to contact me to discuss the above recommendation further.

## 2022-06-14 NOTE — PLAN OF CARE
Patient cleared for discharge from case management standpoint.    Patient discharging home with hospice all consents signed. Aly admit nurse for hospice will meet patient at home to admit. MANN spoke with Carola Becker, all DME will be in place for admission.     MANN entered ADT 30 order for [facility], eta 1800. Carola notified of ETA.    MANN spoke with spouse Dami, informed of ambulance eta of 1800. Caregiver verbalized understanding.    Chart and discharge orders reviewed.  Patient discharged home with home hospice. No further case management needs.         06/14/22 1639   Final Note   Assessment Type Final Discharge Note   Anticipated Discharge Disposition HospiceHome   What phone number can be called within the next 1-3 days to see how you are doing after discharge? [phone]   Hospital Resources/Appts/Education Provided Provided patient/caregiver with written discharge plan information   Post-Acute Status   Post-Acute Authorization Hospice   Hospice Status Set-up Complete/Auth obtained   Discharge Delays (!) Ambulance Transport/Facility Transport

## 2022-06-14 NOTE — CONSULTS
CM spoke with patient and spouse regarding hospice services for comfort care vs curative care. Patient and spouse both agree that they want to go home with hospice, comfort care services. Patient does not have a preference on which agency. CM spoke with [facility] liaison and sent referral via [facility].

## 2022-06-14 NOTE — CARE UPDATE
06/14/22 0725   Patient Assessment/Suction   Level of Consciousness (AVPU) alert   Respiratory Effort Unlabored   Expansion/Accessory Muscles/Retractions expansion symmetric   All Lung Fields Breath Sounds Anterior:;diminished   Rhythm/Pattern, Respiratory unlabored   PRE-TX-O2   O2 Device (Oxygen Therapy) Vapotherm   $ Is the patient on High Flow Oxygen? Yes   $ Vapotherm Daily Charge Vapotherm Daily   Humidification temp set 35   Humidification temp actual 35   Flow (L/min) 30   Oxygen Concentration (%) 50   SpO2 (!) 94 %   Pulse 85   Resp 16   Aerosol Therapy   $ Aerosol Therapy Charges Aerosol Treatment   Daily Review of Necessity (SVN) completed   Respiratory Treatment Status (SVN) given   Treatment Route (SVN) mask   Patient Position (SVN) HOB elevated   Post Treatment Assessment (SVN) breath sounds unchanged   Signs of Intolerance (SVN) none   Breath Sounds Post-Respiratory Treatment   Throughout All Fields Post-Treatment All Fields   Throughout All Fields Post-Treatment Anterior:;diminished   Post-treatment Heart Rate (beats/min) 93   Post-treatment Resp Rate (breaths/min) 18   Education   $ Education Bronchodilator;15 min   Respiratory Evaluation   $ Care Plan Tech Time 15 min   $ Eval/Re-eval Charges Re-evaluation

## 2022-06-14 NOTE — HPI
Ms. Vega is a 72yo female with a PMH of COPD, hypercalcemia and non-compliance issues is admitted currently with profound weakness and sob.  She was recently admitted when she was found to have abnormal lung imaging and hypercalcemia.  She was to have work up for possible cancer as an OP but came back to the hospital before that could be done.  She states she is barely able to walk at this time but is able to move all her extremities freely.  She underwent bronchoscopy which has now diagnosed her with squamous cell carcinoma of the lung.      CT imaging of the chest/a/p show a RLL lesion and multiple mediastinal lymph nodes.  She also has a lesion near the pancreas suspicious for lymph node spread of disease.

## 2022-06-14 NOTE — CONSULTS
[facility]  Hematology/Oncology  Consult Note    Patient Name: Jia Vega  MRN: [id number]  Admission Date: 6/9/2022  Hospital Length of Stay: 5 days  Code Status: Full Code   Attending Provider: Norm Leon MD  Consulting Provider: Ravin Abebe MD  Primary Care Physician: Patrick Olson MD  Principal Problem:Acute hypoxemic respiratory failure    Consults  Subjective:     HPI:  Ms. Vega is a 72yo female with a PMH of COPD, hypercalcemia and non-compliance issues is admitted currently with profound weakness and sob.  She was recently admitted when she was found to have abnormal lung imaging and hypercalcemia.  She was to have work up for possible cancer as an OP but came back to the hospital before that could be done.  She states she is barely able to walk at this time but is able to move all her extremities freely.  She underwent bronchoscopy which has now diagnosed her with squamous cell carcinoma of the lung.      CT imaging of the chest/a/p show a RLL lesion and multiple mediastinal lymph nodes.  She also has a lesion near the pancreas suspicious for lymph node spread of disease.        Oncology Treatment Plan:   [Could not find a treatment plan. This SmartLink may be configured incorrectly. Contact a  for help.]    Medications:  Continuous Infusions:  Scheduled Meds:   albuterol-ipratropium  3 mL Nebulization Q6H WAKE    aspirin  81 mg Oral Daily    cefUROXime  250 mg Oral Q12H    dextromethorphan-guaiFENesin  mg  2 tablet Oral BID    famotidine  20 mg Oral Daily    multivitamin  1 tablet Oral Daily    potassium chloride  20 mEq Oral BID    pravastatin  80 mg Oral QHS    predniSONE  40 mg Oral Daily     PRN Meds:albuterol, benzonatate, guaiFENesin-codeine 100-10 mg/5 ml, LIDOcaine HCL 4%, melatonin, sodium chloride 0.9%, sodium chloride 0.9%     Review of patient's allergies indicates:  No Known Allergies     Past Medical History:   Diagnosis  Date    Arthritis     Asthma     Full dentures     GERD (gastroesophageal reflux disease)     Hypertension     Seasonal allergies     Wears glasses      Past Surgical History:   Procedure Laterality Date    BACK SURGERY      BRONCHOSCOPY WITH FLUOROSCOPY N/A 6/10/2022    Procedure: BRONCHOSCOPY, WITH FLUOROSCOPY;  Surgeon: Kimberly Tellez MD;  Location: [facility];  Service: Pulmonary;  Laterality: N/A;    cubital tunnel release  left    HAND SURGERY      right and left: ganglion cyst    HYSTERECTOMY  BSO    NECK SURGERY       Family History       Family history is unknown by patient.          Tobacco Use    Smoking status: Current Every Day Smoker     Packs/day: 0.25     Years: 40.00     Pack years: 10.00     Types: Cigarettes    Smokeless tobacco: Never Used    Tobacco comment: patient states 3 cigs per day   Substance and Sexual Activity    Alcohol use: No     Alcohol/week: 1.7 standard drinks     Types: 2 Standard drinks or equivalent per week    Drug use: No    Sexual activity: Not Currently       Review of Systems   Constitutional:  Positive for fatigue. Negative for activity change, appetite change, diaphoresis, fever and unexpected weight change.   HENT:  Negative for congestion and hearing loss.    Eyes:  Negative for visual disturbance.   Respiratory:  Positive for shortness of breath. Negative for cough and chest tightness.    Cardiovascular:  Negative for chest pain and leg swelling.   Gastrointestinal:  Negative for abdominal pain, blood in stool, diarrhea, nausea and vomiting.   Endocrine: Negative for cold intolerance and heat intolerance.   Genitourinary:  Negative for difficulty urinating, dysuria and hematuria.   Neurological:  Negative for dizziness and headaches.   Hematological:  Negative for adenopathy. Does not bruise/bleed easily.   Psychiatric/Behavioral:  Negative for behavioral problems.    Objective:     Vital Signs (Most Recent):  Temp: 98.7 °F (37.1 °C) (06/14/22  0808)  Pulse: 104 (06/14/22 0808)  Resp: 16 (06/14/22 0808)  BP: 121/83 (06/14/22 0808)  SpO2: (!) 92 % (06/14/22 0808)   Vital Signs (24h Range):  Temp:  [97.8 °F (36.6 °C)-98.7 °F (37.1 °C)] 98.7 °F (37.1 °C)  Pulse:  [] 104  Resp:  [16-18] 16  SpO2:  [92 %-97 %] 92 %  BP: (103-121)/(66-83) 121/83     Weight: 72.4 kg (159 lb 9.8 oz)  Body mass index is 25 kg/m².  Body surface area is 1.85 meters squared.    No intake or output data in the 24 hours ending 06/14/22 0834    Physical Exam  Constitutional:       Appearance: She is well-developed.   HENT:      Head: Normocephalic and atraumatic.      Right Ear: External ear normal.      Left Ear: External ear normal.   Eyes:      Conjunctiva/sclera: Conjunctivae normal.      Pupils: Pupils are equal, round, and reactive to light.   Neck:      Thyroid: No thyromegaly.      Trachea: No tracheal deviation.   Cardiovascular:      Rate and Rhythm: Normal rate and regular rhythm.      Heart sounds: Normal heart sounds.   Pulmonary:      Effort: Pulmonary effort is normal.      Breath sounds: Normal breath sounds.   Abdominal:      General: Bowel sounds are normal. There is no distension.      Palpations: Abdomen is soft. There is no mass.      Tenderness: There is no abdominal tenderness.   Skin:     Findings: No rash.   Neurological:      Comments: Neuro intact througout   Psychiatric:         Behavior: Behavior normal.         Thought Content: Thought content normal.         Judgment: Judgment normal.       Significant Labs:   CBC:   Recent Labs   Lab 06/13/22  0727 06/14/22  0506   WBC 17.04* 17.92*   HGB 12.7 12.9   HCT 40.9 41.0    435    and CMP:   Recent Labs   Lab 06/13/22  0727 06/14/22  0506   * 132*   K 3.9 4.2   CL 98 97   CO2 29 28    85   BUN 18 18   CREATININE 0.5 0.5   CALCIUM 9.9 9.6   ANIONGAP 8 7*   EGFRNONAA >60.0 >60.0       Diagnostic Results:  None    Assessment/Plan:     Lung cancer  This is a new diagnosis and appears to be  at least stage III and possibly stage IV depending on the finding near the pancreas.  I had a long discussion about therapy approaches with her.  She is not a surgical candidate given the mediastinal findings and due to her overall poor PS of 3-4.  I discussed typically, combined radiation and chemotherapy is the approach here.  She is adamantly opposed to chemotherapy of any type and refuses this therapy.  She states she may consider radiation therapy.      Given this discussion,  I further discussed end of life issues and hospice care and described this service to her.  She is interested in a hospice approach as she would very much like to stay home through the course of this disease and is not really interested in prolong this process.  I will ask radiation oncology to see her for options and can create a disposition from there.          Thank you for your consult. I will follow-up with patient. Please contact us if you have any additional questions.    Ravin Abebe MD  Hematology/Oncology  [facility]

## 2022-06-14 NOTE — SUBJECTIVE & OBJECTIVE
Oncology Treatment Plan:   [Could not find a treatment plan. This SmartLink may be configured incorrectly. Contact a  for help.]    Medications:  Continuous Infusions:  Scheduled Meds:   albuterol-ipratropium  3 mL Nebulization Q6H WAKE    aspirin  81 mg Oral Daily    cefUROXime  250 mg Oral Q12H    dextromethorphan-guaiFENesin  mg  2 tablet Oral BID    famotidine  20 mg Oral Daily    multivitamin  1 tablet Oral Daily    potassium chloride  20 mEq Oral BID    pravastatin  80 mg Oral QHS    predniSONE  40 mg Oral Daily     PRN Meds:albuterol, benzonatate, guaiFENesin-codeine 100-10 mg/5 ml, LIDOcaine HCL 4%, melatonin, sodium chloride 0.9%, sodium chloride 0.9%     Review of patient's allergies indicates:  No Known Allergies     Past Medical History:   Diagnosis Date    Arthritis     Asthma     Full dentures     GERD (gastroesophageal reflux disease)     Hypertension     Seasonal allergies     Wears glasses      Past Surgical History:   Procedure Laterality Date    BACK SURGERY      BRONCHOSCOPY WITH FLUOROSCOPY N/A 6/10/2022    Procedure: BRONCHOSCOPY, WITH FLUOROSCOPY;  Surgeon: Kimberly Tellez MD;  Location: [facility];  Service: Pulmonary;  Laterality: N/A;    cubital tunnel release  left    HAND SURGERY      right and left: ganglion cyst    HYSTERECTOMY  BSO    NECK SURGERY       Family History       Family history is unknown by patient.          Tobacco Use    Smoking status: Current Every Day Smoker     Packs/day: 0.25     Years: 40.00     Pack years: 10.00     Types: Cigarettes    Smokeless tobacco: Never Used    Tobacco comment: patient states 3 cigs per day   Substance and Sexual Activity    Alcohol use: No     Alcohol/week: 1.7 standard drinks     Types: 2 Standard drinks or equivalent per week    Drug use: No    Sexual activity: Not Currently       Review of Systems   Constitutional:  Positive for fatigue. Negative for activity change, appetite change, diaphoresis, fever and  unexpected weight change.   HENT:  Negative for congestion and hearing loss.    Eyes:  Negative for visual disturbance.   Respiratory:  Positive for shortness of breath. Negative for cough and chest tightness.    Cardiovascular:  Negative for chest pain and leg swelling.   Gastrointestinal:  Negative for abdominal pain, blood in stool, diarrhea, nausea and vomiting.   Endocrine: Negative for cold intolerance and heat intolerance.   Genitourinary:  Negative for difficulty urinating, dysuria and hematuria.   Neurological:  Negative for dizziness and headaches.   Hematological:  Negative for adenopathy. Does not bruise/bleed easily.   Psychiatric/Behavioral:  Negative for behavioral problems.    Objective:     Vital Signs (Most Recent):  Temp: 98.7 °F (37.1 °C) (06/14/22 0808)  Pulse: 104 (06/14/22 0808)  Resp: 16 (06/14/22 0808)  BP: 121/83 (06/14/22 0808)  SpO2: (!) 92 % (06/14/22 0808)   Vital Signs (24h Range):  Temp:  [97.8 °F (36.6 °C)-98.7 °F (37.1 °C)] 98.7 °F (37.1 °C)  Pulse:  [] 104  Resp:  [16-18] 16  SpO2:  [92 %-97 %] 92 %  BP: (103-121)/(66-83) 121/83     Weight: 72.4 kg (159 lb 9.8 oz)  Body mass index is 25 kg/m².  Body surface area is 1.85 meters squared.    No intake or output data in the 24 hours ending 06/14/22 0834    Physical Exam  Constitutional:       Appearance: She is well-developed.   HENT:      Head: Normocephalic and atraumatic.      Right Ear: External ear normal.      Left Ear: External ear normal.   Eyes:      Conjunctiva/sclera: Conjunctivae normal.      Pupils: Pupils are equal, round, and reactive to light.   Neck:      Thyroid: No thyromegaly.      Trachea: No tracheal deviation.   Cardiovascular:      Rate and Rhythm: Normal rate and regular rhythm.      Heart sounds: Normal heart sounds.   Pulmonary:      Effort: Pulmonary effort is normal.      Breath sounds: Normal breath sounds.   Abdominal:      General: Bowel sounds are normal. There is no distension.      Palpations:  Abdomen is soft. There is no mass.      Tenderness: There is no abdominal tenderness.   Skin:     Findings: No rash.   Neurological:      Comments: Neuro intact througout   Psychiatric:         Behavior: Behavior normal.         Thought Content: Thought content normal.         Judgment: Judgment normal.       Significant Labs:   CBC:   Recent Labs   Lab 06/13/22  0727 06/14/22  0506   WBC 17.04* 17.92*   HGB 12.7 12.9   HCT 40.9 41.0    435    and CMP:   Recent Labs   Lab 06/13/22  0727 06/14/22  0506   * 132*   K 3.9 4.2   CL 98 97   CO2 29 28    85   BUN 18 18   CREATININE 0.5 0.5   CALCIUM 9.9 9.6   ANIONGAP 8 7*   EGFRNONAA >60.0 >60.0       Diagnostic Results:  None

## 2022-06-14 NOTE — PROGRESS NOTES
Pulmonary/Critical Care progress note      PATIENT NAME: Jia Vega  MRN: [id number]  TODAY'S DATE: 2022  4:32 PM  ADMIT DATE: 2022  AGE: 71 y.o. : 1950      HPI:  Patient is a 71-year-old female who was admitted a few weeks ago with hypercalcemia what appeared to be a right lower lobe pneumonia.  The patient was supposed to follow up after 2 weeks after her antibiotics with a new chest x-ray to see if cm infiltrating cleared but did not.  She returns today complaining of more shortness of breath and now her CT clearly shows a very large mass with necrotic areas and adenopathy.  She is again hypercalcemic.  When asked why the patient in follow-up in the office, she states she for got.  She states she is not resume smoking.  She just does not have a taste for it any longer.    6/10 the patient consented to undergo bronchoscopy.  She is still not sure if she would undergo chemotherapy if her or offered to her.  She is now on Vapotherm following her procedure.     the patient's pathology is positive for a non small cell bronchogenic carcinoma.  I feel certain this is going to be a squamous cell given her hypercalcemia.  The patient is still requiring significant Vapotherm to oxygenate.  I do not think she is going to be a chemotherapy or radiation candidate.  No chemo because of her very poor performance status and no radiation because there is not a localized focus of tumor that is causing a problem at this time.  The patient wants to go home.  She refuses to go to a skilled nursing facility.  She is requiring too much oxygen to go home.  She is upset with her diagnosis, despite the fact that I have been telling her for a week now that this was likely cancer.     the patient's pathology is squamous cell carcinoma.  She was seen by Dr. Mcclellan and Dr. Moreau.  She refused chemotherapy.  Dr. Gandara has offered to see her as an outpatient to consider palliative radiotherapy.  She is ready  to go home with hospice.  Her oxygen needs are down to 7 L which is doable at home.    REVIEW OF SYSTEMS  GENERAL: Feeling weak.  EYES: Vision is good.  ENT: No sinusitis or pharyngitis.   HEART: No chest pain or palpitations.  LUNGS:  She is coughing and now having some streaky hemoptysis.  She short of breath with movement.  GI: No Nausea, vomiting, constipation, diarrhea, or reflux.  : No dysuria, hesitancy, or nocturia.  SKIN: No lesions or rashes.  MUSCULOSKELETAL: No joint pain or myalgias.  NEURO: No headaches or neuropathy.  LYMPH: No edema or adenopathy.  PSYCH: No anxiety or depression.  ENDO: No weight change.    No change in the patient's Past Medical History, Past Surgical History, Social History or Family History since admission.        VITAL SIGNS (MOST RECENT)  Temp: 98.1 °F (36.7 °C) (06/14/22 1149)  Pulse: 91 (06/14/22 1314)  Resp: 18 (06/14/22 1314)  BP: 122/68 (06/14/22 1149)  SpO2: (!) 93 % (06/14/22 1314)    INTAKE AND OUTPUT (LAST 24 HOURS):    Intake/Output Summary (Last 24 hours) at 6/14/2022 1545  Last data filed at 6/14/2022 1200  Gross per 24 hour   Intake 120 ml   Output --   Net 120 ml       WEIGHT  Wt Readings from Last 1 Encounters:   06/09/22 72.4 kg (159 lb 9.8 oz)       PHYSICAL EXAM  GENERAL: Older patient, crying with her diagnosis.  HEENT: Pupils equal and reactive. Extraocular movements intact. Nose intact. Pharynx moist.  NECK: Supple.   HEART: Regular rate and rhythm. No murmur or gallop auscultated.  LUNGS:  There are no breath sounds in the right base or the right lower anterior thorax either.. Lung excursion symmetrical. No change in fremitus.   ABDOMEN: Bowel sounds present. Non-tender, no masses palpated.  : Normal anatomy.  EXTREMITIES: Normal muscle tone and joint movement, no cyanosis or clubbing.   LYMPHATICS: No adenopathy palpated, no edema.  SKIN: Dry, intact, no lesions.   NEURO: Cranial nerves II-XII intact. Motor strength 5/5 bilaterally, upper and lower  extremities.  PSYCH: Appropriate affect      CBC LAST (LAST 24 HOURS)  Recent Labs   Lab 06/14/22  0506   WBC 17.92*   RBC 5.16   HGB 12.9   HCT 41.0   MCV 80*   MCH 25.0*   MCHC 31.5*   RDW 17.3*      MPV 9.0*       CHEMISTRY LAST (LAST 24 HOURS)  Recent Labs   Lab 06/14/22  0506   *   K 4.2   CL 97   CO2 28   ANIONGAP 7*   BUN 18   CREATININE 0.5   GLU 85   CALCIUM 9.6    Corrected calcium 13.3      CARDIAC PROFILE (LAST 24 HOURS)  Recent Labs   Lab 06/09/22  1040   BNP 94   CPK 14*   TROPONINI 0.037       LAST 7 DAYS MICROBIOLOGY   Microbiology Results (last 7 days)     Procedure Component Value Units Date/Time    Blood culture #1 **CANNOT BE ORDERED STAT** [[id number]] Collected: 06/09/22 1305    Order Status: Completed Specimen: Blood from Peripheral, Hand, Right Updated: 06/14/22 1432     Blood Culture, Routine No growth after 5 days.    Blood culture #2 **CANNOT BE ORDERED STAT** [[id number]] Collected: 06/09/22 1320    Order Status: Completed Specimen: Blood from Peripheral, Forearm, Left Updated: 06/14/22 1432     Blood Culture, Routine No growth after 5 days.    AFB Culture & Smear [[id number]] Collected: 06/10/22 1036    Order Status: Completed Specimen: Respiratory from Bronchial Wash, RLL Updated: 06/13/22 0856     AFB CULTURE STAIN No acid fast bacilli seen.     AFB CULTURE STAIN Testing performed by:     AFB CULTURE STAIN Lab [facility]     [facility] [address] [facility]     [place] [address]     AFB CULTURE STAIN Dr.Brian Arin MD    Narrative:      Micro wash    Culture, Respiratory with Gram Stain [[id number]] Collected: 06/10/22 1036    Order Status: Completed Specimen: Respiratory from Bronchial Wash, RLL Updated: 06/12/22 1403     Respiratory Culture No growth     Gram Stain (Respiratory) <10 epithelial cells per low power field.     Gram Stain (Respiratory) Moderate WBC's     Gram Stain (Respiratory) No organisms seen    Narrative:      Micro  wash    GIANLUCA prep [[id number]] Collected: 06/10/22 1036    Order Status: Completed Specimen: Respiratory from Bronchial Wash, RLL Updated: 06/10/22 1317     KOH Prep No yeast or fungal elements seen    Narrative:      Micro wash    Fungus culture [[id number]] Collected: 06/10/22 1036    Order Status: Sent Specimen: Respiratory from Bronchial Wash, RLL Updated: 06/10/22 1056          MOST RECENT IMAGING  X-Ray Chest AP Portable  HISTORY: post bronchoscopy with biopsy    FINDINGS: Portable chest radiograph at 1007 hours compared to 06/09/2022 shows the cardiomediastinal silhouette and pulmonary vasculature are stable, with aortic vascular calcifications.    There are right mid and lower lung airspace opacities, not significantly changed, with scattered reticulonodular densities in both lungs. There is no pneumothorax.    IMPRESSION: Negative for pneumothorax post bronchoscopy and biopsy.    Electronically signed by:  Carlos Earl MD  6/10/2022 10:44 AM CDT Workstation: [id number]  [place] [employer] Usage  See Notes    This procedure was auto-finalized.      CURRENT VISIT EKG  Results for orders placed or performed during the hospital encounter of 06/09/22   EKG 12-lead    Narrative    Test Reason : R06.02,    Vent. Rate : 096 BPM     Atrial Rate : 096 BPM     P-R Int : 126 ms          QRS Dur : 086 ms      QT Int : 368 ms       P-R-T Axes : 080 045 029 degrees     QTc Int : 464 ms    Normal sinus rhythm  Normal ECG  When compared with ECG of 15-MAY-2022 17:42,  Fusion complexes are no longer Present    Referred By: [email or web address]   SELF           Confirmed By:        ECHOCARDIOGRAM RESULTS  No results found for this or any previous visit.    Vapotherm  Oxygen Concentration (%):  [50-60] 50    30 L and 60%    LAST ARTERIAL BLOOD GAS  ABG  Recent Labs   Lab 06/10/22  0945   PH 7.412   PO2 107*   PCO2 56.6*   HCO3 36.1*   BE 11       IMPRESSION AND PLAN  Large right lower lobe squamous cell carcinoma with metastatic nodes in the chest  and abdomen.    Hypercalcemia, better  COPD        Track calcium  Bronchodilators  Oxygen to keep sats 90% or better, wean as tolerated  Ceftin  Prednisone taper          Kimberly Tellez MD  [facility]  Department of Pulmonology  Date of Service: 06/14/2022  4:32 PM

## 2022-06-14 NOTE — ASSESSMENT & PLAN NOTE
This is a new diagnosis and appears to be at least stage III and possibly stage IV depending on the finding near the pancreas.  I had a long discussion about therapy approaches with her.  She is not a surgical candidate given the mediastinal findings and due to her overall poor PS of 3-4.  I discussed typically, combined radiation and chemotherapy is the approach here.  She is adamantly opposed to chemotherapy of any type and refuses this therapy.  She states she may consider radiation therapy.      Given this discussion,  I further discussed end of life issues and hospice care and described this service to her.  She is interested in a hospice approach as she would very much like to stay home through the course of this disease and is not really interested in prolong this process.  I will ask radiation oncology to see her for options and can create a disposition from there.

## 2022-06-14 NOTE — RESPIRATORY THERAPY
06/13/22 1959   Patient Assessment/Suction   Level of Consciousness (AVPU) alert   Respiratory Effort Normal;Unlabored   Expansion/Accessory Muscles/Retractions no use of accessory muscles   All Lung Fields Breath Sounds equal bilaterally;diminished;coarse   Rhythm/Pattern, Respiratory unlabored   Cough Frequency frequent   Cough Type fair;loose;nonproductive   PRE-TX-O2   O2 Device (Oxygen Therapy) Vapotherm   Humidification temp set 35   Humidification temp actual 35   Flow (L/min) 30   Oxygen Concentration (%) 60   SpO2 95 %   Pulse Oximetry Type Continuous   $ Pulse Oximetry - Multiple Charge Pulse Oximetry - Multiple   Pulse 83   Resp 18   Aerosol Therapy   $ Aerosol Therapy Charges Aerosol Treatment   Daily Review of Necessity (SVN) completed   Respiratory Treatment Status (SVN) given   Treatment Route (SVN) mask   Patient Position (SVN) HOB elevated   Post Treatment Assessment (SVN) breath sounds unchanged   Signs of Intolerance (SVN) none   Breath Sounds Post-Respiratory Treatment   Post-treatment Heart Rate (beats/min) 83   Post-treatment Resp Rate (breaths/min) 20   Ready to Wean/Extubation Screen   FIO2<=50 (chart decimal) (!) 0.6   Education   $ Education Bronchodilator;15 min   Respiratory Evaluation   $ Care Plan Tech Time 15 min   $ Eval/Re-eval Charges Re-evaluation   Evaluation For Re-Eval 5+ day

## 2022-06-14 NOTE — PT/OT/SLP EVAL
Physical Therapy Evaluation and Discharge Note    Patient Name:  Jia Vega   MRN:  [id number]    Recommendations:     Discharge Recommendations:  Pt and  have decided on Home with Hospice  Discharge Equipment Recommendations: none       Assessment:     Jia Vega is a 71 y.o. female admitted with a medical diagnosis of Acute hypoxemic respiratory failure. .  At this time, patient is functioning at their prior level of function and does not require further acute PT services.    Recent Surgery: Procedure(s) (LRB):  BRONCHOSCOPY, WITH FLUOROSCOPY (N/A) 4 Days Post-Op    Plan:     During this hospitalization, patient does not require further acute PT services.  Please re-consult if situation changes.      Subjective     Chief Complaint; dyspnea  Patient/Family Comments/goals: Home with her   Pain/Comfort:  ·      Patients cultural, spiritual, Hindu conflicts given the current situation:      Living Environment:  Pt lives at home with her  in a [facility]  w/o entry steps.  Prior to admission, patient required assistance from her   for ADL's and functional transfers.  Equipment used at home: oxygen.  DME owned (not currently used): standard walker.  Upon discharge, patient will have assistance from her .    Objective:     Communicated with nurse prior to session.  Patient found HOB elevated with bed alarm, PureWick, oxygen, pulse ox (continuous) upon PT entry to room.    General Precautions: Standard, fall   Orthopedic Precautions:    Braces:     Respiratory Status: High flow, flow 30 L/min, concentration 50%    Exams:  · Cognitive Exam:  Patient is oriented to Person, Place and Situation  · RLE ROM: WFL  · RLE Strength: 2+/5  · LLE ROM: WFL  · LLE Strength: 2+/5    Functional Mobility:  · Bed Mobility:     · Supine to Sit: moderate assistance and of 2 persons  · Sit to Supine: moderate assistance and of 2 persons    AM-PAC 6 CLICK MOBILITY  Total Score:8       Therapeutic  Activities and Exercises:  PT eval and DC home with Hospice care    AM-PAC 6 CLICK MOBILITY  Total Score:8     Patient left HOB elevated with all lines intact, call button in reach, bed alarm on and her  present.    GOALS:   Multidisciplinary Problems     Physical Therapy Goals     Not on file                History:     Past Medical History:   Diagnosis Date    Arthritis     Asthma     Full dentures     GERD (gastroesophageal reflux disease)     Hypertension     Seasonal allergies     Wears glasses        Past Surgical History:   Procedure Laterality Date    BACK SURGERY      BRONCHOSCOPY WITH FLUOROSCOPY N/A 6/10/2022    Procedure: BRONCHOSCOPY, WITH FLUOROSCOPY;  Surgeon: Kimberly Tellez MD;  Location: [facility];  Service: Pulmonary;  Laterality: N/A;    cubital tunnel release  left    HAND SURGERY      right and left: ganglion cyst    HYSTERECTOMY  BSO    NECK SURGERY         Time Tracking:     PT Received On: 06/14/22  PT Start Time: 1017     PT Stop Time: 1030  PT Total Time (min): 13 min     Billable Minutes: Evaluation 5 minutes and Therapeutic Activity 8 minutes      06/14/2022

## 2022-06-14 NOTE — ASSESSMENT & PLAN NOTE
Patient's pathology is positive for a non small cell bronchogenic carcinoma   Awaiting home with hospice care

## 2022-06-14 NOTE — SUBJECTIVE & OBJECTIVE
Interval History:     Review of Systems   Constitutional:  Negative for activity change and appetite change.   HENT:  Negative for congestion and dental problem.    Eyes:  Negative for discharge and itching.   Respiratory:  Positive for shortness of breath.    Cardiovascular:  Negative for chest pain.   Gastrointestinal:  Negative for abdominal distention and abdominal pain.   Endocrine: Negative for cold intolerance.   Genitourinary:  Negative for difficulty urinating and dysuria.   Musculoskeletal:  Negative for arthralgias and back pain.   Skin:  Negative for color change.   Neurological:  Negative for dizziness and facial asymmetry.   Hematological:  Negative for adenopathy.   Psychiatric/Behavioral:  Negative for agitation and behavioral problems.    Objective:     Vital Signs (Most Recent):  Temp: 98.1 °F (36.7 °C) (06/14/22 1149)  Pulse: 91 (06/14/22 1314)  Resp: 18 (06/14/22 1314)  BP: 122/68 (06/14/22 1149)  SpO2: (!) 93 % (06/14/22 1314)   Vital Signs (24h Range):  Temp:  [97.8 °F (36.6 °C)-98.7 °F (37.1 °C)] 98.1 °F (36.7 °C)  Pulse:  [] 91  Resp:  [16-18] 18  SpO2:  [92 %-95 %] 93 %  BP: (103-122)/(66-83) 122/68     Weight: 72.4 kg (159 lb 9.8 oz)  Body mass index is 25 kg/m².    Intake/Output Summary (Last 24 hours) at 6/14/2022 1444  Last data filed at 6/14/2022 0931  Gross per 24 hour   Intake 0 ml   Output --   Net 0 ml      Physical Exam  Vitals and nursing note reviewed.   Constitutional:       General: She is not in acute distress.  HENT:      Head: Atraumatic.      Right Ear: External ear normal.      Left Ear: External ear normal.      Nose: Nose normal.      Mouth/Throat:      Mouth: Mucous membranes are moist.   Eyes:      General: No scleral icterus.  Cardiovascular:      Rate and Rhythm: Normal rate.   Pulmonary:      Effort: Pulmonary effort is normal.   Musculoskeletal:         General: Normal range of motion.      Cervical back: Normal range of motion.   Skin:     General: Skin is  warm.   Neurological:      Mental Status: She is alert and oriented to person, place, and time.       Significant Labs: All pertinent labs within the past 24 hours have been reviewed.  CBC:   Recent Labs   Lab 06/13/22  0727 06/14/22  0506   WBC 17.04* 17.92*   HGB 12.7 12.9   HCT 40.9 41.0    435     CMP:   Recent Labs   Lab 06/13/22  0727 06/14/22  0506   * 132*   K 3.9 4.2   CL 98 97   CO2 29 28    85   BUN 18 18   CREATININE 0.5 0.5   CALCIUM 9.9 9.6   ANIONGAP 8 7*   EGFRNONAA >60.0 >60.0       Significant Imaging: I have reviewed all pertinent imaging results/findings within the past 24 hours.

## 2022-06-14 NOTE — CARE UPDATE
06/14/22 1100   PRE-TX-O2   O2 Device (Oxygen Therapy) High Flow nasal Cannula   $ Is the patient on High Flow Oxygen? Yes   Flow (L/min) 6

## 2022-06-14 NOTE — ASSESSMENT & PLAN NOTE
In the background of Lung cancer/COPD  Had Bronchoscopy on 06/10  On Home oxygen   Patient's pathology is positive for a non small cell bronchogenic carcinoma  Awaiting home with hospice care

## 2022-06-14 NOTE — PLAN OF CARE
Pt compliant with POC. RR even and unlabored. No signs of distress noted. Will continue to monitor.     Problem: Adult Inpatient Plan of Care  Goal: Plan of Care Review  Outcome: Ongoing, Progressing  Goal: Patient-Specific Goal (Individualized)  Outcome: Ongoing, Progressing  Goal: Absence of Hospital-Acquired Illness or Injury  Outcome: Ongoing, Progressing  Goal: Optimal Comfort and Wellbeing  Outcome: Ongoing, Progressing  Goal: Readiness for Transition of Care  Outcome: Ongoing, Progressing     Problem: Adjustment to Illness (Sepsis/Septic Shock)  Goal: Optimal Coping  Outcome: Ongoing, Progressing     Problem: Bleeding (Sepsis/Septic Shock)  Goal: Absence of Bleeding  Outcome: Ongoing, Progressing

## 2022-06-14 NOTE — DISCHARGE SUMMARY
[facility] Medicine  Discharge Summary      Patient Name: Jia Vega  MRN: [id number]  Patient Class: IP- Inpatient  Admission Date: 6/9/2022  Hospital Length of Stay: 5 days  Discharge Date and Time:  06/14/2022 5:15 PM  Attending Physician: Norm Leon MD   Discharging Provider: Norm Leon MD  Primary Care Provider: Patrick Olson MD      HPI:   71-year-old female with past medical history of GERD asthma COPD chronic active smoker hypertension she was discharged last month due to hypoxia shortness for breath hypercalcemia she was treated with IV fluids pulmonary the rate consult on per records was very difficult to treat her in the hospital due to noncompliance refusing treatment she received antibiotics for possible pneumonia on the right lower lobe of lung due to presence of infiltrate and she was sent home with antibiotics as well it is unclear if patient has been compliant with medications she was also scheduled to have follow-up with PCP and pulmonology as outpatient for workup for possible malignancy due to presence of significant infiltrate in the right lung with hypercalcemia at the time of previous hospitalization she was also seen by Nephrology she comes to the ER today because she states that ever since past discharge she still has weakness his Itz is able to get out of the bed and walk low p.o. intake shortness of breath beyond the back bilaterally family states that she has not always been compliant with oxygen home that was arranged at discharge last time when she arrived to the ER it was noticed that she was hypoxic and required 4 L as cannula to have saturations at 90% whole cause was slightly elevated she received LR meropenem vancomycin would did reside DuoNeb she is going to be admitted to medical floor with telemetry.      Procedure(s) (LRB):  BRONCHOSCOPY, WITH FLUOROSCOPY (N/A)      Hospital Course:   Patient got admitted with Acute hypoxemic respiratory  failure In the background of Lung cancer/COPD  Had Bronchoscopy on 06/10 and Patient's pathology report was positive for a non small cell bronchogenic carcinoma  Pt was evaluated by Pulmonology/Radiation Oncology/Oncology Mds  Pt and Family opted for hospice care and was discharged back to home       Goals of Care Treatment Preferences:  Code Status: Full Code      Consults:   Consults (From admission, onward)        Status Ordering Provider     Inpatient consult to   Once        Provider:  (Not yet assigned)    Completed GLENN LOPEZ     Inpatient consult to Radiation Oncology  Once        Provider:  Abdulaziz Gandara Jr., MD    Completed SUZETTE BHATT     Inpatient consult to Radiation Oncology  Once        Provider:  Abdulaziz Gandara Jr., MD    Completed SANDOVAL TELLEZ     Inpatient consult to Hematology Oncology  Once        Provider:  Suzette Bhatt MD    Acknowledged SANDOVAL TELLEZ     Inpatient consult to Registered Dietitian/Nutritionist  Once        Provider:  (Not yet assigned)    Completed GLENN LOPEZ     IP consult to case management  Once        Provider:  (Not yet assigned)    Completed STAR ZHENG     Inpatient consult to Nephrology  Once        Provider:  Damian Don MD    Completed MARCE STAR     Inpatient consult to Pulmonology  Once        Provider:  Sandoval Tellez MD    Completed GALO ZHENGLANDO     Inpatient consult to Hospitalist  Once        Provider:  Star Zheng MD    Acknowledged JAZZ MCDANIELS          No new Assessment & Plan notes have been filed under this hospital service since the last note was generated.  Service: Hospital Medicine    Final Active Diagnoses:    Diagnosis Date Noted POA    Noncompliance [Z91.19] 06/10/2022 Not Applicable    Lung cancer [C34.90] 06/10/2022 Unknown    Chronic obstructive pulmonary disease [J44.9] 08/11/2017 Yes      Problems Resolved During this Admission:    Diagnosis Date Noted Date Resolved  POA    PRINCIPAL PROBLEM:  Acute hypoxemic respiratory failure [J96.01] 05/15/2022 06/14/2022 Yes    Hypercalcemia [E83.52] 06/10/2022 06/14/2022 Unknown    Hyponatremia [E87.1] 06/10/2022 06/14/2022 Unknown       Discharged Condition: good    Disposition: Hospice/Home    Follow Up:   Contact information for after-discharge care     Home Medical Care     [facility] .    Service: Home Hospice  Contact information:  [address] [facility] [address] [phone]                           Patient Instructions:   No discharge procedures on file.    Significant Diagnostic Studies: Labs:   CMP   Recent Labs   Lab 06/13/22  0727 06/14/22  0506   * 132*   K 3.9 4.2   CL 98 97   CO2 29 28    85   BUN 18 18   CREATININE 0.5 0.5   CALCIUM 9.9 9.6   ANIONGAP 8 7*   ESTGFRAFRICA >60.0 >60.0   EGFRNONAA >60.0 >60.0    and CBC   Recent Labs   Lab 06/13/22 0727 06/14/22  0506   WBC 17.04* 17.92*   HGB 12.7 12.9   HCT 40.9 41.0    435       Pending Diagnostic Studies:     Procedure Component Value Units Date/Time    Cytology Specimen-Medical Cytology (Fluid/Wash/Brush) [[id number]] Collected: 06/10/22 1036    Order Status: Sent Lab Status: No result     Specimen: Bronch wash          Medications:  Reconciled Home Medications:      Medication List      START taking these medications    cefUROXime 500 MG tablet  Commonly known as: CEFTIN  Take 1 tablet (500 mg total) by mouth every 12 (twelve) hours. for 7 days     dextromethorphan-guaiFENesin  mg  mg per 12 hr tablet  Commonly known as: MUCINEX DM  Take 2 tablets by mouth 2 (two) times daily. for 10 days        CHANGE how you take these medications    albuterol 90 mcg/actuation inhaler  Commonly known as: PROVENTIL/VENTOLIN HFA  INHALE 1 PUFF BY MOUTH EVERY 4 TO 6 HOURS AS NEEDED FOR WHEEZING OR SHORTNESS OF BREATH  What changed:   · how much to take  · how to take this  · when to take this  · reasons to take  this     oxybutynin 5 MG Tab  Commonly known as: DITROPAN  TAKE 1 TABLET BY MOUTH TWICE DAILY( WATCH FOR DRY MOUTH AND CONSTIPATION)  What changed:   · how much to take  · how to take this  · when to take this     predniSONE 20 MG tablet  Commonly known as: DELTASONE  Take 2 tablets (40 mg total) by mouth once daily for 5 days, THEN 1 tablet (20 mg total) once daily for 5 days.  Start taking on: Zoraida 15, 2022  What changed: See the new instructions.     sertraline 50 MG tablet  Commonly known as: ZOLOFT  TAKE 1 TABLET(50 MG) BY MOUTH EVERY NIGHT  What changed: See the new instructions.        CONTINUE taking these medications    aspirin 81 MG EC tablet  Commonly known as: ECOTRIN  Take 1 tablet (81 mg total) by mouth Daily.     B COMPLEX 1 ORAL  Take 1 tablet by mouth Daily.     omeprazole 40 MG capsule  Commonly known as: PRILOSEC  Take 1 capsule (40 mg total) by mouth once daily.     pravastatin 80 MG tablet  Commonly known as: PRAVACHOL  Take 1 tablet (80 mg total) by mouth every evening.        STOP taking these medications    amlodipine-olmesartan 5-20 mg per tablet  Commonly known as: ALICE     guaiFENesin 600 mg 12 hr tablet  Commonly known as: MUCINEX     methylPREDNISolone 4 mg tablet  Commonly known as: MEDROL DOSEPACK     multivitamin capsule            Indwelling Lines/Drains at time of discharge:   Lines/Drains/Airways     None               Physical Exam   Constitutional: She is oriented to person, place, and time.   Cardiovascular: Normal rate.   Neurological: She is alert and oriented to person, place, and time.     Time spent on the discharge of patient: 44  minutes         Norm Leon MD  Department of Hospital Medicine  [facility]

## 2022-06-14 NOTE — CARE UPDATE
06/14/22 0725   Patient Assessment/Suction   Level of Consciousness (AVPU) alert   Respiratory Effort Unlabored   Expansion/Accessory Muscles/Retractions expansion symmetric   All Lung Fields Breath Sounds Anterior:;diminished   Rhythm/Pattern, Respiratory unlabored   PRE-TX-O2   O2 Device (Oxygen Therapy) Vapotherm   $ Is the patient on High Flow Oxygen? Yes   $ Vapotherm Daily Charge Vapotherm Daily   Humidification temp set 35   Humidification temp actual 35   Flow (L/min) 30   Oxygen Concentration (%) 50   SpO2 (!) 94 %   Pulse Oximetry Type Continuous   $ Pulse Oximetry - Multiple Charge Pulse Oximetry - Multiple   Pulse 85   Resp 16   Aerosol Therapy   $ Aerosol Therapy Charges Aerosol Treatment   Daily Review of Necessity (SVN) completed   Respiratory Treatment Status (SVN) given   Treatment Route (SVN) mask   Patient Position (SVN) HOB elevated   Post Treatment Assessment (SVN) breath sounds unchanged   Signs of Intolerance (SVN) none   Breath Sounds Post-Respiratory Treatment   Throughout All Fields Post-Treatment All Fields   Throughout All Fields Post-Treatment Anterior:;diminished   Post-treatment Heart Rate (beats/min) 93   Post-treatment Resp Rate (breaths/min) 18   Education   $ Education Bronchodilator;15 min   Respiratory Evaluation   $ Care Plan Tech Time 15 min   $ Eval/Re-eval Charges Re-evaluation

## 2022-06-14 NOTE — PROGRESS NOTES
[facility] Medicine  Progress Note    Patient Name: Jia Vega  MRN: [id number]  Patient Class: IP- Inpatient   Admission Date: 6/9/2022  Length of Stay: 5 days  Attending Physician: Norm Leon MD  Primary Care Provider: Patrick Olson MD        Subjective:     Principal Problem:Acute hypoxemic respiratory failure        HPI:  71-year-old female with past medical history of GERD asthma COPD chronic active smoker hypertension she was discharged last month due to hypoxia shortness for breath hypercalcemia she was treated with IV fluids pulmonary the rate consult on per records was very difficult to treat her in the hospital due to noncompliance refusing treatment she received antibiotics for possible pneumonia on the right lower lobe of lung due to presence of infiltrate and she was sent home with antibiotics as well it is unclear if patient has been compliant with medications she was also scheduled to have follow-up with PCP and pulmonology as outpatient for workup for possible malignancy due to presence of significant infiltrate in the right lung with hypercalcemia at the time of previous hospitalization she was also seen by Nephrology she comes to the ER today because she states that ever since past discharge she still has weakness his Itz is able to get out of the bed and walk low p.o. intake shortness of breath beyond the back bilaterally family states that she has not always been compliant with oxygen home that was arranged at discharge last time when she arrived to the ER it was noticed that she was hypoxic and required 4 L as cannula to have saturations at 90% whole cause was slightly elevated she received LR meropenem vancomycin would did reside DuoNeb she is going to be admitted to medical floor with telemetry.      Overview/Hospital Course:  06/10  Had Bronchoscopy today  Overall poor idea about her health condition and whats going on     06/11  Remains on Vapotherm  BP  levels low     06/12  Awaiting Pathology reports  Vitals stable  On bed all the time  Sats better     06/13  Patient's pathology is positive for a non small cell bronchogenic carcinoma  No new issues  Still on vapotherm  Remains on bed all time     06/14  Off Vapotherm   On 7 L NC  Pt agreed with hospice care  Pts  wants to think about hospice care       Interval History:     Review of Systems   Constitutional:  Negative for activity change and appetite change.   HENT:  Negative for congestion and dental problem.    Eyes:  Negative for discharge and itching.   Respiratory:  Positive for shortness of breath.    Cardiovascular:  Negative for chest pain.   Gastrointestinal:  Negative for abdominal distention and abdominal pain.   Endocrine: Negative for cold intolerance.   Genitourinary:  Negative for difficulty urinating and dysuria.   Musculoskeletal:  Negative for arthralgias and back pain.   Skin:  Negative for color change.   Neurological:  Negative for dizziness and facial asymmetry.   Hematological:  Negative for adenopathy.   Psychiatric/Behavioral:  Negative for agitation and behavioral problems.    Objective:     Vital Signs (Most Recent):  Temp: 98.1 °F (36.7 °C) (06/14/22 1149)  Pulse: 91 (06/14/22 1314)  Resp: 18 (06/14/22 1314)  BP: 122/68 (06/14/22 1149)  SpO2: (!) 93 % (06/14/22 1314)   Vital Signs (24h Range):  Temp:  [97.8 °F (36.6 °C)-98.7 °F (37.1 °C)] 98.1 °F (36.7 °C)  Pulse:  [] 91  Resp:  [16-18] 18  SpO2:  [92 %-95 %] 93 %  BP: (103-122)/(66-83) 122/68     Weight: 72.4 kg (159 lb 9.8 oz)  Body mass index is 25 kg/m².    Intake/Output Summary (Last 24 hours) at 6/14/2022 1444  Last data filed at 6/14/2022 0931  Gross per 24 hour   Intake 0 ml   Output --   Net 0 ml      Physical Exam  Vitals and nursing note reviewed.   Constitutional:       General: She is not in acute distress.  HENT:      Head: Atraumatic.      Right Ear: External ear normal.      Left Ear: External ear normal.       Nose: Nose normal.      Mouth/Throat:      Mouth: Mucous membranes are moist.   Eyes:      General: No scleral icterus.  Cardiovascular:      Rate and Rhythm: Normal rate.   Pulmonary:      Effort: Pulmonary effort is normal.   Musculoskeletal:         General: Normal range of motion.      Cervical back: Normal range of motion.   Skin:     General: Skin is warm.   Neurological:      Mental Status: She is alert and oriented to person, place, and time.       Significant Labs: All pertinent labs within the past 24 hours have been reviewed.  CBC:   Recent Labs   Lab 06/13/22  0727 06/14/22  0506   WBC 17.04* 17.92*   HGB 12.7 12.9   HCT 40.9 41.0    435     CMP:   Recent Labs   Lab 06/13/22 0727 06/14/22  0506   * 132*   K 3.9 4.2   CL 98 97   CO2 29 28    85   BUN 18 18   CREATININE 0.5 0.5   CALCIUM 9.9 9.6   ANIONGAP 8 7*   EGFRNONAA >60.0 >60.0       Significant Imaging: I have reviewed all pertinent imaging results/findings within the past 24 hours.      Assessment/Plan:      * Acute hypoxemic respiratory failure  In the background of Lung cancer/COPD  Had Bronchoscopy on 06/10  On Home oxygen   Patient's pathology is positive for a non small cell bronchogenic carcinoma  Awaiting home with hospice care     Lung cancer  Patient's pathology is positive for a non small cell bronchogenic carcinoma   Awaiting home with hospice care       Noncompliance  Ongoing issue  Also insight is very poor     Hyponatremia  Mostly SIADH in the background of lung lesion      Hypercalcemia  Hypercalcemia of malignancy      Chronic obstructive pulmonary disease  Maintain Present Rx      VTE Risk Mitigation (From admission, onward)         Ordered     IP VTE HIGH RISK PATIENT  Once         06/09/22 1429     Place sequential compression device  Until discontinued         06/09/22 1429                Discharge Planning   NIRMAL: 6/16/2022     Code Status: Full Code   Is the patient medically ready for discharge?:  Yes    Reason for patient still in hospital (select all that apply): Other (specify) awaiing home hospice care  Discharge Plan A: Hospice/home   Discharge Delays: (!) Change in Medical Condition              Norm Leon MD  Department of Hospital Medicine   [facility]

## 2022-06-20 NOTE — TELEPHONE ENCOUNTER
----- Message from Patrick Olson MD sent at 6/20/2022  8:56 AM CDT -----  This patient has an abnormal bronchoscopy results suggestive of cancer.  I have left a message for the patient to call back so that I can discuss further options and referral to Oncology for the same.  Full me out of the room if she calls back or take it time when it is best to call her back.

## 2022-06-20 NOTE — TELEPHONE ENCOUNTER
Patient's spouse called and said patient is bedridden now. He is asking for advise on what to do now that she is bedridden.

## 2022-06-21 NOTE — PHYSICIAN QUERY
"PT Name: Jia Vega  MR #: [id number]     DOCUMENTATION CLARIFICATION      CDS/: Madonna Ashkarla               Contact information: [phone]    This form is a permanent document in the medical record.     Query Date: June 21, 2022    Dear Provider,  By submitting this query, we are merely seeking further clarification of documentation.  Please utilize your independent clinical judgment when addressing the question(s) below.     The Medical Record contains the following:    Supporting Clinical Findings Location in Medical Record   Risk factors; 70yo female presents w/ malaise, fatigue shortness of breath and generalized nonfocal weakness, recent admit for COPD DC'd on home O2.   ED   Clinical indicators;  "she received antibiotics for possible pneumonia on the right lower lobe of lung due to presence of infiltrate and she was sent home with antibiotics as well it is unclear if patient has been compliant with medications"    Stable alveolar consolidation in the right mid and lower lung with increasing reticulonodular opacities in left lung base suggestive of pneumonia.    afebrile  RR 26-17  sats 89-96%  O2 NC 4-3L > Vapotherm 50-65%    Bronchoscopy culture NGTD (s/p 1 day of Abx's)  WBC 14.09 > 14.54 > 14.60 > 14.87 > 17.04 > 17.92  LA 2.0 > 1.5    Post-bronchoscopy Dx non-small cell carcinoma to R lung     Hx from admit 1 month prior           CXR 06/09         VS flowsheet          Labs         Pathology   Interventions;  Admit to Med/tele w/ PNA    Consult Pulmonary    Cefuroxime 250mg po Q12 hrs (06/13-06/14)   Cefuroxime 500mg po  started upon DC for an additional 7 days  Meropenem 1g IV x1 (06/09)  Meropenem 1g IV Q8 hrs (06/09-06/13)  Vancomycin 500mg IV x1 (06/09)  Vancomycin 1g IV x1 (06/09)  Vancomycin 1.25g IV Q12 hrs (06/10-06/11)  Vancomycin 1.25g IV x1 (06/12)     Admission orders    MD orders    MAR     Dear doctor please clarify if:    [  ] PNA is ruled in and treated (continued tx upon " DC)     [ X ] PNA ruled out

## 2022-07-01 NOTE — TELEPHONE ENCOUNTER
Had a discussion with patient's son Mr. Rich San concerning is patient condition.  Again reiterated the fact that miss Ro has advanced cancer from lung which is spread to the pancreas and also given her longstanding history of smoking.    Patient is currently under hospice.  She is somewhat immobile and difficult to ambulate.    Patient's  Mr. Dami san had commented that Ms. Ro should get every opportunity for treatment and that the medical system is letting her die.    I did discuss with Oncology colleagues and patient had refused medical care and just wanted to bear out the consequences of lung cancer at peace and at home.  Hospice will be providing adequate care and comfort for these issues.    However if the patient changes her mind and seeks an opinion then will arrange it from a different set of providers and settings.    Patient's son will apprise me accordingly.  He will have a gentle family discussion concerning the same.

## 2022-07-28 NOTE — PROGRESS NOTES
July 28, 2022    Family of Ms.Barbara LASHAWN Vega  [address]  [place] [address]             [facility]  [address]  [place] [address]  Phone: [phone]  Fax: [phone] To the family of Ms. Vega:        Please accept our deepest condolences in regards to the recent death of your family member, Ms. Vega. She was a most remarkable person, and it was always a pleasure to see her. I hope that we were able to provide her with some relief during the time that she was under our care.     On behalf of myself and my staff, please also extend our condolences to all of your family. If there is anything else that we can do for you, please do not hesitate to contact us.      Sincerely,        Patrick Olson MD

## 2022-07-28 NOTE — LETTER
July 28, 2022    Family of Ms.Barbara LASHAWN Vega  [address]  [place] [address]             [facility]  [address] [address] [facility]  [place] [address]  Phone: [phone]  Fax: [phone] To the family of Ms. Vega:        Please accept our deepest condolences in regards to the recent death of your family member, Ms. Vega. She was a most remarkable person, and it was always a pleasure to see her. I hope that we were able to provide her with some relief during the time that she was under our care.     On behalf of myself and my staff, please also extend our condolences to all of your family. If there is anything else that we can do for you, please do not hesitate to contact us.      Sincerely,        Patrick Olson MD

## 2022-08-03 LAB
ACID FAST MOD KINY STN SPEC: NORMAL
MYCOBACTERIUM SPEC QL CULT: NORMAL

## 2024-05-02 NOTE — PROGRESS NOTES
"Nephrology Consult Note        Patient Name: Jia Vega  MRN: [id number]    Patient Class: IP- Inpatient   Admission Date: 5/15/2022  Length of Stay: 3 days  Date of Service: 5/18/2022    Attending Physician: Mini Sutherland MD  Primary Care Provider: Patrick Olson MD    Reason for Consult: hypercalcemia/hyponatremia/uti/alkalosis/anemia/htn     SUBJECTIVE:     HPI: 71F with GERD, Asthma, life long nicotine addiction presented to ED complaining of 7-10 days of progressively worsening SOB, worsening cough with scant sputum, aunquenchable thirst ("I can't stop drinking ice water"). She denied any chest pain. No orthopnea, PND, nor pedal edema. She has "Bone pain" in her legs and lower back--7/10 constant aching worse with palpation and movement. When she presented she was hypoxic, eventually requiring Vapotherm to maintain good saturation. She was initially tachycardic and hypotensive, but responded well to the 30 ml/kg NS for sepsis.     In ED: labs reviewed: Leukocytosis with normal H/H; hypokalemia (treated in ED, and sliding scale started) with stage 3a/b renal dysfunction, non-fasting hyperglycemia, severe hypercalcemia and hypoalbuminemia (calculated Calcium = 15.98).; elevated lactate. Flu and COVID negative. U/A: dirty urine. Patient was pan-cultured. CXR reviewed: large RLL pneumonia (no history of N/V). EKG reviewed: sinus tach without acute segments. Started NS infusion 150 ml/hr after the 30 ml/kg had finished and Aredia 30 mg iv X 1 dose; broad spectrum antibiotics started with nebs and steroids.     5/17 VSS, no new complains.  5/18 VSS, doing better, no new complains.    Past Medical History:   Diagnosis Date    Arthritis     Asthma     Full dentures     GERD (gastroesophageal reflux disease)     Hypertension     Seasonal allergies     Wears glasses      Past Surgical History:   Procedure Laterality Date    BACK SURGERY      cubital tunnel release  left    HAND SURGERY      right and " Therapy and Counseling Services:  [facility]     [phone]  [facility] [place] [address]  [facility]:     [phone]  ([place])   [facility]:     [phone]   ([place])   [phone]  ([place])   [facility]:       [phone]   ([place])  [facility]:      [phone]   ([place])  [phone]   ([place])  [facility]:    [phone] ([place])  [facility]:     [phone]   ([place])  [facility]:     [phone]   ([place])   [facility]     [phone]  ([place])  [facility]:    [phone]   ([place])   [facility]:     [phone]   ([place])   [facility]:     [phone]   ([place])   [facility]:  [phone]   ([place])  [facility]     [phone]   ([place])   [facility]:      [phone]  ([place])   [facility]:    [phone]   ([place])  [facility]:  [phone]  ([place])  [phone]   ([place])   [facility]:    [phone]   ([place])   [facility]:     [phone]   ([place])   [phone]  ([place])   Hakan Beltran, PhD:     [phone]   ([place])  Jack Brunner, PhD:      [phone]   ([place])  [facility]     [phone]  ([place])  [place]      [phone]  [facility]      [phone]  [facility]      [phone]  [facility]     [phone]  [facility]     [phone]  [facility]    [phone]  [facility]     [phone] ([place])    Psychiatry:  [facility]:  [phone]  ([facility]  at multiple locations)   [facility]:     [phone]   ([place]/multiple locations)   [facility]:  [phone]   ([place])   [place]      [phone]  [facility]      [phone]  [facility]      [phone]         left: ganglion cyst    HYSTERECTOMY  BSO    NECK SURGERY       Family History   Family history unknown: Yes     Social History     Tobacco Use    Smoking status: Current Every Day Smoker     Packs/day: 0.25     Years: 40.00     Pack years: 10.00     Types: Cigarettes    Smokeless tobacco: Never Used    Tobacco comment: patient states 3 cigs per day   Substance Use Topics    Alcohol use: No     Alcohol/week: 1.7 standard drinks     Types: 2 Standard drinks or equivalent per week    Drug use: No       Review of patient's allergies indicates:  No Known Allergies    Outpatient meds:  No current facility-administered medications on file prior to encounter.     Current Outpatient Medications on File Prior to Encounter   Medication Sig Dispense Refill    albuterol (PROVENTIL/VENTOLIN HFA) 90 mcg/actuation inhaler INHALE 1 PUFF BY MOUTH EVERY 4 TO 6 HOURS AS NEEDED FOR WHEEZING OR SHORTNESS OF BREATH (Patient taking differently: Inhale 1 puff into the lungs every 4 to 6 hours as needed. INHALE 1 PUFF BY MOUTH EVERY 4 TO 6 HOURS AS NEEDED FOR WHEEZING OR SHORTNESS OF BREATH) 8.5 g 2    amlodipine-olmesartan (ALICE) 5-20 mg per tablet TAKE 1 TABLET BY MOUTH DAILY (Patient taking differently: Take 1 tablet by mouth once daily.) 90 tablet 1    aspirin (ECOTRIN) 81 MG EC tablet Take 1 tablet (81 mg total) by mouth Daily. 100 tablet 4    guaiFENesin (MUCINEX) 600 mg 12 hr tablet Take 2 tablets (1,200 mg total) by mouth 2 (two) times daily. 20 tablet 0    multivitamin capsule Take 1 capsule by mouth once daily.      omeprazole (PRILOSEC) 40 MG capsule Take 1 capsule (40 mg total) by mouth once daily. 30 capsule 5    oxybutynin (DITROPAN) 5 MG Tab TAKE 1 TABLET BY MOUTH TWICE DAILY( WATCH FOR DRY MOUTH AND CONSTIPATION) (Patient taking differently: Take 5 mg by mouth 2 (two) times daily. TAKE 1 TABLET BY MOUTH TWICE DAILY( WATCH FOR DRY MOUTH AND CONSTIPATION)) 60 tablet 5    pravastatin (PRAVACHOL) 80 MG tablet Take  1 tablet (80 mg total) by mouth every evening. 90 tablet 0    sertraline (ZOLOFT) 50 MG tablet TAKE 1 TABLET(50 MG) BY MOUTH EVERY NIGHT (Patient taking differently: Take 50 mg by mouth every evening.) 90 tablet 0    VITAMIN B COMPLEX (B COMPLEX 1 ORAL) Take 1 tablet by mouth Daily.         Scheduled meds:   albuterol-ipratropium  3 mL Nebulization Q6H WAKE    aspirin  81 mg Oral Daily    benzonatate  200 mg Oral TID    cefTRIAXone (ROCEPHIN) IVPB  1 g Intravenous Q24H    chlorhexidine  15 mL Mouth/Throat BID    doxycycline  100 mg Oral Q12H    enoxaparin  40 mg Subcutaneous Daily    guaiFENesin  1,200 mg Oral BID    multivitamin  1 tablet Oral Daily    mupirocin   Nasal BID    nystatin  500,000 Units Oral QID    oxybutynin  5 mg Oral BID    pantoprazole  40 mg Oral Before breakfast    pravastatin  80 mg Oral QHS    predniSONE  40 mg Oral Daily    sertraline  50 mg Oral QHS       Infusions:   0.45% NaCl with KCl 40 mEq infusion 100 mL/hr (05/18/22 0047)       PRN meds:  acetaminophen, calcium chloride IVPB, calcium chloride IVPB, calcium chloride IVPB, dextrose 10%, dextrose 10%, insulin aspart U-100, magnesium oxide, magnesium sulfate IVPB, magnesium sulfate IVPB, magnesium sulfate IVPB, magnesium sulfate IVPB, melatonin, ondansetron, potassium chloride, potassium chloride, potassium chloride, potassium chloride    Review of Systems:    OBJECTIVE:     Vital Signs and IO (Last 24H):  Temp:  [97.6 °F (36.4 °C)-98.9 °F (37.2 °C)]   Pulse:  [71-81]   Resp:  [18-31]   BP: ()/(57-73)   SpO2:  [91 %-96 %]   I/O last 3 completed shifts:  In: 2935.1 [P.O.:200; I.V.:2385; IV Piggyback:350.1]  Out: 2500 [Urine:2500]    Wt Readings from Last 5 Encounters:   05/17/22 77.9 kg (171 lb 11.8 oz)   04/28/22 83.5 kg (184 lb)   05/25/21 93.9 kg (207 lb 0.2 oz)   05/07/21 93.9 kg (207 lb)   10/01/19 90.3 kg (199 lb)     Physical Exam:  Physical Exam  Constitutional:       Appearance: She is well-developed. She  is not diaphoretic.   HENT:      Head: Normocephalic and atraumatic.   Eyes:      General: No scleral icterus.     Pupils: Pupils are equal, round, and reactive to light.   Cardiovascular:      Rate and Rhythm: Normal rate and regular rhythm.   Pulmonary:      Effort: Pulmonary effort is normal. No respiratory distress.      Breath sounds: No stridor.   Abdominal:      General: There is no distension.      Palpations: Abdomen is soft.   Musculoskeletal:         General: No deformity. Normal range of motion.      Cervical back: Neck supple.   Skin:     General: Skin is warm and dry.      Findings: No erythema or rash.   Neurological:      Mental Status: She is alert and oriented to person, place, and time.      Cranial Nerves: No cranial nerve deficit.   Psychiatric:         Behavior: Behavior normal.       Body mass index is 26.9 kg/m².    Laboratory:  Recent Labs   Lab 05/16/22 0421 05/16/22  1204 05/17/22  0539    137 137   K 3.1* 3.4* 3.5   CL 96 94* 100   CO2 32* 29 27   BUN 30* 31* 26*   CREATININE 1.1 1.1 1.0   ESTGFRAFRICA 58.8* 58.8* >60.0   EGFRNONAA 51.0* 51.0* 57.2*   * 149* 117*       Recent Labs   Lab 05/15/22  1828 05/16/22 0421 05/16/22  1204 05/17/22  0539   CALCIUM 14.3* 13.0* 13.1* 10.1   ALBUMIN 2.3* 2.0* 2.2* 2.0*   PHOS 3.2  --   --   --    MG 1.6 1.5* 1.5* 1.9       Recent Labs   Lab 05/15/22  1739 05/16/22 0421 05/17/22  0540   WBC 18.76* 12.67 18.25*   HGB 13.8 11.9* 12.2   HCT 43.4 36.2* 37.6   * 373 372   MCV 81* 78* 79*   MCHC 31.8* 32.9 32.4   MONO 4.8  0.9 1.0*  0.1* 5.8  1.1*       Recent Labs   Lab 05/16/22 0421 05/16/22  1204 05/17/22  0539   BILITOT 0.8 0.8 0.8   PROT 6.5 6.8 6.2   ALBUMIN 2.0* 2.2* 2.0*   ALKPHOS 90 92 90   ALT 18 18 19   AST 19 20 19       Recent Labs   Lab 05/15/22  1816   Color, UA Yellow   Appearance, UA Cloudy A   pH, UA 6.0   Specific Gravity, UA 1.025   Protein, UA 2+ A   Glucose, UA Negative   Ketones, UA Negative   Urobilinogen,  UA 1.0   Bilirubin (UA) 1+ A   Occult Blood UA 3+ A   Nitrite, UA Negative   RBC, UA 9 H   WBC, UA >100 H   Bacteria Occasional   Hyaline Casts, UA 2370 A       Recent Labs   Lab 05/15/22  1743   POC PH 7.503 H   POC PCO2 46.0 H   POC HCO3 36.1 H   POC PO2 48   POC SATURATED O2 86 L   POC BE 13   Sample VENOUS       Microbiology Results (last 7 days)     Procedure Component Value Units Date/Time    Blood culture x two cultures. Draw prior to antibiotics. [[id number]] Collected: 05/15/22 1740    Order Status: Completed Specimen: Blood from Peripheral, Antecubital, Right Updated: 05/17/22 1832     Blood Culture, Routine No Growth to date      No Growth to date      No Growth to date    Narrative:      Aerobic and anaerobic    Blood culture x two cultures. Draw prior to antibiotics. [[id number]] Collected: 05/15/22 1745    Order Status: Completed Specimen: Blood from Peripheral, Forearm, Left Updated: 05/17/22 1832     Blood Culture, Routine No Growth to date      No Growth to date      No Growth to date    Narrative:      Aerobic and anaerobic    Urine culture [[id number]]  (Abnormal)  (Susceptibility) Collected: 05/15/22 1816    Order Status: Completed Specimen: Urine Updated: 05/17/22 0635     Urine Culture, Routine ESCHERICHIA COLI  >100,000 cfu/ml      Narrative:      Specimen Source->Urine        ASSESSMENT/PLAN:     Hypercalcemia  Alkalosis  Hypokalemia  UTI and/or PNA  No NSAIDs, ACEI/ARB, IV contrast or other nephrotoxins.  Keep MAP > 60, SBP > 100.  Treat UTI.  Low PTH, look for malignancy. PTH-rp pending.  Got pamidronate and IVF.  Calcitonin BID for 2 doses on 5/16, no need to repeat for now.    Anemia  Hgb and HCT are acceptable. Monitor.    HTN, now hypotension  BP seem controlled.   Tolerate asymptomatic HTN up to -160.  HOLD home meds.  Low sodium diet.    Thank you for allowing us to participate in the care of your patient!   We will follow the patient and provide recommendations as  needed.    Patient care time was spent personally by me on the following activities:   · Obtaining a history.  · Examination of patient.  · Providing medical care at the patients bedside.  · Developing a treatment plan with patient or surrogate and bedside caregivers.  · Ordering and reviewing laboratory studies, radiographic studies, pulse oximetry.  · Ordering and performing treatments and interventions.  · Evaluation of patient's response to treatment.  · Discussions with consultants while on the unit and immediately available to the patient.  · Re-evaluation of the patient's condition.  · Documentation in the medical record.     Damian Don MD    [facility]  [address] [place] [id number]    [phone] - tel  [phone] - fax    5/18/2022